# Patient Record
Sex: FEMALE | Race: WHITE | NOT HISPANIC OR LATINO | Employment: UNEMPLOYED | URBAN - NONMETROPOLITAN AREA
[De-identification: names, ages, dates, MRNs, and addresses within clinical notes are randomized per-mention and may not be internally consistent; named-entity substitution may affect disease eponyms.]

---

## 2017-01-09 ENCOUNTER — TELEPHONE (OUTPATIENT)
Dept: PSYCHIATRY | Facility: CLINIC | Age: 47
End: 2017-01-09

## 2017-01-09 NOTE — TELEPHONE ENCOUNTER
She can try taking 1.5 tablets of zyprexa until she sees me next week. If that does not help, we will try something new at next week's appointment. Thanks

## 2017-01-09 NOTE — TELEPHONE ENCOUNTER
Patient called to say that the Zyprexa wasn't working. Stated she didn't know if you could anything about it over the phone, but she sees you next week.

## 2017-01-18 ENCOUNTER — OFFICE VISIT (OUTPATIENT)
Dept: PSYCHIATRY | Facility: CLINIC | Age: 47
End: 2017-01-18

## 2017-01-18 VITALS
HEART RATE: 80 BPM | BODY MASS INDEX: 29.42 KG/M2 | DIASTOLIC BLOOD PRESSURE: 74 MMHG | WEIGHT: 155.8 LBS | SYSTOLIC BLOOD PRESSURE: 115 MMHG | HEIGHT: 61 IN

## 2017-01-18 DIAGNOSIS — F31.0 BIPOLAR I DISORDER, MOST RECENT EPISODE HYPOMANIC (HCC): Primary | ICD-10-CM

## 2017-01-18 DIAGNOSIS — F41.1 GENERALIZED ANXIETY DISORDER: ICD-10-CM

## 2017-01-18 PROCEDURE — 99213 OFFICE O/P EST LOW 20 MIN: CPT

## 2017-01-18 RX ORDER — OLANZAPINE 10 MG/1
20 TABLET, ORALLY DISINTEGRATING ORAL NIGHTLY
Qty: 30 TABLET | Refills: 2 | Status: SHIPPED | OUTPATIENT
Start: 2017-01-18 | End: 2017-01-19 | Stop reason: SDUPTHER

## 2017-01-18 NOTE — MR AVS SNAPSHOT
Rani CABEZAS Martins   1/18/2017 3:00 PM   Office Visit    Dept Phone:  [phone]   Encounter #:  [id number]    Provider:  Jey Palmer MD   Department:  [facility]                Your Full Care Plan              Today's Medication Changes          These changes are accurate as of: 1/18/17  3:42 PM.  If you have any questions, ask your nurse or doctor.               Medication(s)that have changed:     OLANZapine zydis 10 MG disintegrating tablet   Commonly known as:  ZyPREXA   Take 2 tablets by mouth Every Night.   What changed:  how much to take            Where to Get Your Medications      These medications were sent to [address] - [phone]  - [phone] [address] [address]     Phone:  [phone]     OLANZapine zydis 10 MG disintegrating tablet                  Your Updated Medication List          This list is accurate as of: 1/18/17  3:42 PM.  Always use your most recent med list.                ARIPiprazole 2 MG tablet   Commonly known as:  ABILIFY   Take 1 tablet by mouth Daily.       diazePAM 10 MG tablet   Commonly known as:  VALIUM   Take 1 tablet by mouth 3 (Three) Times a Day As Needed for anxiety.       doxepin 75 MG capsule   Commonly known as:  SINEquan   Take 1 capsule by mouth Every Night.       hydrOXYzine 50 MG capsule   Commonly known as:  VISTARIL   Take 1 capsule by mouth 3 (Three) Times a Day As Needed for anxiety.       OLANZapine zydis 10 MG disintegrating tablet   Commonly known as:  ZyPREXA   Take 2 tablets by mouth Every Night.               You Were Diagnosed With        Codes Comments    Bipolar I disorder, most recent episode hypomanic    -  Primary ICD-10-CM: F31.0  ICD-9-CM: 296.40     Generalized anxiety disorder     ICD-10-CM: F41.1  ICD-9-CM: 300.02       Instructions     None    Patient Instructions History      Upcoming Appointments     "Visit Type Date Time Department    MEDICINE CHECK 2017  3:00 PM [facility]    MEDICINE CHECK 2017  3:00 PM [facility]      [employer] Signup     [facility] [employer] allows you to send messages to your doctor, view your test results, renew your prescriptions, schedule appointments, and more. To sign up, go to [employer] and click on the Sign Up Now link in the New User? box. Enter your [employer] Activation Code exactly as it appears below along with the last four digits of your Social Security Number and your Date of Birth () to complete the sign-up process. If you do not sign up before the expiration date, you must request a new code.    [employer] Activation Code: [email or web address]  Expires: 2017  3:42 PM    If you have questions, you can email [email or web address] or call [phone] to talk to our [employer] staff. Remember, [employer] is NOT to be used for urgent needs. For medical emergencies, dial 911.               Other Info from Your Visit           Your Appointments     2017  3:00 PM EST   Medicine Check with Jey Palmer MD   [facility] (--)    [address] [place] [id number]   [phone]              Allergies     Lyrica [Pregabalin]      Cymbalta [Duloxetine Hcl]      Erythromycin      Lithium      Toradol [Ketorolac Tromethamine]      Tramadol      Penicillins  Rash      Vital Signs     Blood Pressure Pulse Height Weight Body Mass Index       115/74 80 61\" (154.9 cm) 155 lb 12.8 oz (70.7 kg) 29.44 kg/m2       Problems and Diagnoses Noted     Bipolar I disorder, most recent episode hypomanic    -  Primary    Generalized anxiety disorder            "

## 2017-01-18 NOTE — PROGRESS NOTES
Subjective   Rani Martins is a 46 y.o. female who is here today for medication management follow up.    Chief Complaint:  bipolar    HPI:  History of Present Illness     At last visit on 12/7/16, we increased Zyprexa to 10 mg at bedtime and continued all other meds.  I received a phone call 1/9/17 from patient saying she was having difficulty with sleep and was requesting to go up again on her Zyprexa.  I told her to increase the Zyprexa to 15 mg to see if this helped.  Today she reports she understood me to say to increase it to 20mg, and she says this has been effective. She says she is now sleeping through the night 7-8 hrs and as a result her mood is much better.       The following portions of the patient's history were reviewed and updated as appropriate: allergies, current medications, past family history, past medical history, past social history, past surgical history and problem list.    Review of Systems   Constitutional: Negative for appetite change, chills, diaphoresis, fatigue, fever and unexpected weight change.   HENT: Negative for hearing loss, sore throat, trouble swallowing and voice change.    Eyes: Negative for photophobia and visual disturbance.   Respiratory: Negative for cough, chest tightness and shortness of breath.    Cardiovascular: Negative for chest pain and palpitations.   Gastrointestinal: Negative for abdominal pain, constipation, nausea and vomiting.   Endocrine: Negative for cold intolerance and heat intolerance.   Genitourinary: Negative for dysuria and frequency.   Musculoskeletal: Negative for arthralgias, back pain, joint swelling and neck stiffness.   Skin: Negative for color change and wound.   Allergic/Immunologic: Negative for environmental allergies and immunocompromised state.   Neurological: Negative for dizziness, tremors, seizures, syncope, speech difficulty, weakness, light-headedness and headaches.   Hematological: Negative for adenopathy. Does not bruise/bleed  "easily.       Objective   Physical Exam   Constitutional: She appears well-developed and well-nourished. No distress.   Neurological: She is alert. Coordination and gait normal.   Vitals reviewed.    Blood pressure 115/74, pulse 80, height 61\" (154.9 cm), weight 155 lb 12.8 oz (70.7 kg).    Allergies   Allergen Reactions   • Lyrica [Pregabalin]    • Cymbalta [Duloxetine Hcl]    • Erythromycin    • Lithium    • Toradol [Ketorolac Tromethamine]    • Tramadol    • Penicillins Rash       Current Medications:   Current Outpatient Prescriptions   Medication Sig Dispense Refill   • ARIPiprazole (ABILIFY) 2 MG tablet Take 1 tablet by mouth Daily. 30 tablet 2   • diazePAM (VALIUM) 10 MG tablet Take 1 tablet by mouth 3 (Three) Times a Day As Needed for anxiety. 90 tablet 2   • doxepin (SINEquan) 75 MG capsule Take 1 capsule by mouth Every Night. 1 capsule 2   • hydrOXYzine (VISTARIL) 50 MG capsule Take 1 capsule by mouth 3 (Three) Times a Day As Needed for anxiety. 90 capsule 2   • OLANZapine zydis (ZyPREXA) 10 MG disintegrating tablet Take 2 tablets by mouth Every Night. 30 tablet 2     No current facility-administered medications for this visit.        Mental Status Exam:   Hygiene:   good  Cooperation:  Cooperative  Eye Contact:  Good  Psychomotor Behavior:  Appropriate  Affect:  Full range  Hopelessness: Denies  Speech:  slurred  Thought Process:  Goal directed  Thought Content:  Normal  Suicidal:  None  Homicidal:  None  Hallucinations:  None  Delusion:  None  Memory:  Intact  Orientation:  Person, Place, Time and Situation  Reliability:  fair  Insight:  Fair  Judgement:  Fair  Impulse Control:  Fair  Physical/Medical Issues:  Yes, fibromyalgia    Assessment/Plan   Problems Addressed this Visit     None      Visit Diagnoses     Bipolar I disorder, most recent episode hypomanic    -  Primary    Relevant Medications    OLANZapine zydis (ZyPREXA) 10 MG disintegrating tablet    Generalized anxiety disorder        Relevant " Medications    OLANZapine zydis (ZyPREXA) 10 MG disintegrating tablet          · Continue doxepin 75mg qhs for sleep  · Increase zyprexa to 10mg qhs for mood & sleep.   · Continue vistaril 50mg TID PRN anxiety.   · Continue Abilify 2mg Qam for mood.   · Continue Valium 10mg TID PRN anxiety.     We discussed risks, benefits, and side effects of the above medication and the patient was agreeable with the plan.    Return in about 5 weeks (around 2/23/2017) for Next scheduled follow up.  The patient was instructed to call clinic as needed or go to ER if in crisis.

## 2017-01-19 RX ORDER — OLANZAPINE 20 MG/1
20 TABLET, ORALLY DISINTEGRATING ORAL NIGHTLY
Qty: 30 TABLET | Refills: 2 | Status: SHIPPED | OUTPATIENT
Start: 2017-01-19 | End: 2017-03-06 | Stop reason: SDUPTHER

## 2017-01-19 RX ORDER — OLANZAPINE 20 MG/1
20 TABLET ORAL NIGHTLY
Qty: 30 TABLET | Refills: 0 | Status: CANCELLED | OUTPATIENT
Start: 2017-01-19

## 2017-01-19 NOTE — TELEPHONE ENCOUNTER
That's a pretty big dosage increase so there shouldn't be any reason they wouldn't fill it. Can you find out why they wouldn't fill it?

## 2017-01-19 NOTE — TELEPHONE ENCOUNTER
I didn't mean to put take 2. I meant to say take one 20mg tablet at bedtime. Thanks for clearing it up!

## 2017-01-19 NOTE — TELEPHONE ENCOUNTER
She likes the oral dissolving tablet. I think the prescription you just sent me to sign is the regular tablets so I'm going to delete that prescription and send in a new one for Zyprexa ODT 20mg take one at bedtime, 2 refills. Thanks again

## 2017-01-24 ENCOUNTER — TRANSCRIBE ORDERS (OUTPATIENT)
Dept: ADMINISTRATIVE | Facility: HOSPITAL | Age: 47
End: 2017-01-24

## 2017-01-24 DIAGNOSIS — Z12.31 VISIT FOR SCREENING MAMMOGRAM: Primary | ICD-10-CM

## 2017-01-26 ENCOUNTER — HOSPITAL ENCOUNTER (OUTPATIENT)
Dept: MAMMOGRAPHY | Facility: HOSPITAL | Age: 47
Discharge: HOME | End: 2017-01-26
Attending: FAMILY MEDICINE | Admitting: FAMILY MEDICINE

## 2017-01-26 DIAGNOSIS — Z12.31 VISIT FOR SCREENING MAMMOGRAM: ICD-10-CM

## 2017-01-26 PROCEDURE — 77067 SCR MAMMO BI INCL CAD: CPT | Performed by: RADIOLOGY

## 2017-01-26 PROCEDURE — 77063 BREAST TOMOSYNTHESIS BI: CPT | Performed by: RADIOLOGY

## 2017-01-26 PROCEDURE — 77063 BREAST TOMOSYNTHESIS BI: CPT

## 2017-01-26 PROCEDURE — G0202 SCR MAMMO BI INCL CAD: HCPCS

## 2017-03-06 RX ORDER — OLANZAPINE 10 MG/1
10 TABLET, ORALLY DISINTEGRATING ORAL NIGHTLY
Qty: 30 TABLET | Refills: 0 | Status: SHIPPED | OUTPATIENT
Start: 2017-03-06 | End: 2017-03-30 | Stop reason: SDUPTHER

## 2017-03-24 ENCOUNTER — OFFICE VISIT (OUTPATIENT)
Dept: UROLOGY | Facility: CLINIC | Age: 47
End: 2017-03-24

## 2017-03-24 VITALS
WEIGHT: 152 LBS | SYSTOLIC BLOOD PRESSURE: 108 MMHG | DIASTOLIC BLOOD PRESSURE: 73 MMHG | HEART RATE: 62 BPM | BODY MASS INDEX: 28.7 KG/M2 | HEIGHT: 61 IN

## 2017-03-24 DIAGNOSIS — R39.89 BLADDER PAIN: ICD-10-CM

## 2017-03-24 DIAGNOSIS — R39.198 DIFFICULTY URINATING: ICD-10-CM

## 2017-03-24 DIAGNOSIS — R33.9 INCOMPLETE BLADDER EMPTYING: Primary | ICD-10-CM

## 2017-03-24 LAB
BILIRUB BLD-MCNC: NEGATIVE MG/DL
CLARITY, POC: CLEAR
COLOR UR: YELLOW
GLUCOSE UR STRIP-MCNC: NEGATIVE MG/DL
KETONES UR QL: NEGATIVE
LEUKOCYTE EST, POC: NEGATIVE
NITRITE UR-MCNC: NEGATIVE MG/ML
PH UR: 6 [PH] (ref 5–8)
PROT UR STRIP-MCNC: NEGATIVE MG/DL
RBC # UR STRIP: NEGATIVE /UL
SP GR UR: 1 (ref 1–1.03)
UROBILINOGEN UR QL: NORMAL

## 2017-03-24 PROCEDURE — 81003 URINALYSIS AUTO W/O SCOPE: CPT | Performed by: NURSE PRACTITIONER

## 2017-03-24 PROCEDURE — 99203 OFFICE O/P NEW LOW 30 MIN: CPT | Performed by: NURSE PRACTITIONER

## 2017-03-24 PROCEDURE — 51798 US URINE CAPACITY MEASURE: CPT | Performed by: NURSE PRACTITIONER

## 2017-03-24 RX ORDER — OXYCODONE AND ACETAMINOPHEN 7.5; 325 MG/1; MG/1
1 TABLET ORAL EVERY 6 HOURS PRN
Status: ON HOLD | COMMUNITY
End: 2020-06-27

## 2017-03-24 NOTE — PROGRESS NOTES
Chief Complaint:          Chief Complaint   Patient presents with   • Urinary Retention   • Urinary Tract Infection       HPI:   46 y.o. female being seen for urinary difficulty that has been present since she has had a hysterectomy in 2001. Recently seen at [place] [facility] for similar symptoms.  Currently on Macrobid for UTI by Dr. Stewart - unsure if culture was sent.  Symptoms today are bladder spasms, back pain, urinary frequency,and sensation of incomplete bladder emptying.  She further reports a history of bipolar disorder and anxiety.  She is a nonsmoker.  Father has a history of thyroid cancer.  Urinalysis today reveals negative results. Post void residual bladder scan today reveals 37 mL.    HPI        Past Medical History:      History reviewed. No pertinent past medical history.      Current Meds:     Current Outpatient Prescriptions   Medication Sig Dispense Refill   • ARIPiprazole (ABILIFY) 2 MG tablet Take 1 tablet by mouth Daily. 30 tablet 2   • diazePAM (VALIUM) 10 MG tablet Take 1 tablet by mouth 3 (Three) Times a Day As Needed for anxiety. 90 tablet 2   • doxepin (SINEquan) 75 MG capsule Take 1 capsule by mouth Every Night. 1 capsule 2   • OLANZapine zydis (ZyPREXA) 10 MG disintegrating tablet Take 1 tablet by mouth Every Night. 30 tablet 0   • oxyCODONE-acetaminophen (PERCOCET) 7.5-325 MG per tablet Take 1 tablet by mouth Every 6 (Six) Hours As Needed.     • hydrOXYzine (VISTARIL) 50 MG capsule Take 1 capsule by mouth 3 (Three) Times a Day As Needed for anxiety. 90 capsule 2     No current facility-administered medications for this visit.         Allergies:      Allergies   Allergen Reactions   • Lyrica [Pregabalin]    • Cymbalta [Duloxetine Hcl]    • Erythromycin    • Lithium    • Toradol [Ketorolac Tromethamine]    • Tramadol    • Penicillins Rash        Past Surgical History:     Past Surgical History:   Procedure Laterality Date   • BREAST BIOPSY Left 19 yrs ago    benign   • HYSTERECTOMY      30          Social History:     Social History     Social History   • Marital status:      Spouse name: N/A   • Number of children: N/A   • Years of education: N/A     Occupational History   • Not on file.     Social History Main Topics   • Smoking status: Never Smoker   • Smokeless tobacco: Never Used   • Alcohol use No   • Drug use: No   • Sexual activity: Not on file     Other Topics Concern   • Not on file     Social History Narrative       Family History:     Family History   Problem Relation Age of Onset   • Throat cancer Father    • Heart disease Mother        Review of Systems:     Review of Systems   Constitutional: Negative for chills, fatigue and fever.   Respiratory: Negative for cough, shortness of breath and wheezing.    Cardiovascular: Negative for leg swelling.   Gastrointestinal: Positive for abdominal pain and nausea. Negative for vomiting.   Musculoskeletal: Positive for back pain and joint swelling.   Neurological: Negative for dizziness and headaches.   Psychiatric/Behavioral: Negative for confusion.       Physical Exam:     Physical Exam   Constitutional: She appears well-developed and well-nourished. No distress.   Skin: Skin is warm and dry. No rash noted. She is not diaphoretic. No erythema. No pallor.   Nursing note and vitals reviewed.      Procedure:     No notes on file      Assessment:     Encounter Diagnoses   Name Primary?   • Incomplete bladder emptying Yes   • Bladder pain    • Difficulty urinating      Orders Placed This Encounter   Procedures   • Bladder Scan   • POC Urinalysis Dipstick, Automated       Plan:   Patient did have a postvoid residual of 37 mL and has difficulty with urination son recommend she have a cystoscopy with possible urethral dilation by Dr. Deluca or Dr. Samuels for these symptoms. Discussed the procedure in detail with the patient and she is agreeable. She will be seen in follow-up after the procedures have been completed.    Counseling was given to  patient and family for the following topics diagnostic results, patient and family education, impressions and risks and benefits of treatment options. and the interim medical history and current results were reviewed.  A treatment plan with follow-up was made. Total time of the encounter was 30 minutes and 30 minutes were spent discussing Incomplete bladder emptying [R33.9] face-to-face.       This document has been electronically signed by GABE Gold March 24, 2017 2:12 PM

## 2017-03-30 ENCOUNTER — OFFICE VISIT (OUTPATIENT)
Dept: PSYCHIATRY | Facility: CLINIC | Age: 47
End: 2017-03-30

## 2017-03-30 VITALS
HEIGHT: 61 IN | BODY MASS INDEX: 27.94 KG/M2 | HEART RATE: 77 BPM | WEIGHT: 148 LBS | DIASTOLIC BLOOD PRESSURE: 74 MMHG | SYSTOLIC BLOOD PRESSURE: 130 MMHG

## 2017-03-30 DIAGNOSIS — F31.32 BIPOLAR 1 DISORDER, DEPRESSED, MODERATE (HCC): Primary | ICD-10-CM

## 2017-03-30 DIAGNOSIS — F41.1 GENERALIZED ANXIETY DISORDER: ICD-10-CM

## 2017-03-30 PROCEDURE — 99214 OFFICE O/P EST MOD 30 MIN: CPT

## 2017-03-30 RX ORDER — HYDROXYZINE PAMOATE 50 MG/1
50 CAPSULE ORAL 3 TIMES DAILY PRN
Qty: 90 CAPSULE | Refills: 1 | Status: SHIPPED | OUTPATIENT
Start: 2017-03-30 | End: 2017-05-25

## 2017-03-30 RX ORDER — BUPROPION HYDROCHLORIDE 150 MG/1
150 TABLET, EXTENDED RELEASE ORAL DAILY
Qty: 30 TABLET | Refills: 1 | Status: SHIPPED | OUTPATIENT
Start: 2017-03-30 | End: 2017-05-25 | Stop reason: SDUPTHER

## 2017-03-30 RX ORDER — OLANZAPINE 10 MG/1
10 TABLET, ORALLY DISINTEGRATING ORAL NIGHTLY
Qty: 30 TABLET | Refills: 1 | Status: SHIPPED | OUTPATIENT
Start: 2017-03-30 | End: 2017-05-02 | Stop reason: SDUPTHER

## 2017-03-30 RX ORDER — DIAZEPAM 10 MG/1
10 TABLET ORAL 3 TIMES DAILY PRN
Qty: 90 TABLET | Refills: 1
Start: 2017-03-30 | End: 2017-05-02 | Stop reason: SDUPTHER

## 2017-03-30 NOTE — PROGRESS NOTES
Subjective   Rani Martins is a 46 y.o. female who is here today for medication management follow up.    Chief Complaint:  bipolar    HPI:  History of Present Illness     Her mother accompanies her to today's appointment as usual.  Over the past month or so her mood has been feeling much more irritable than usual.  No other manic symptoms.  She said someone told her doxepin can make you gain weight so she stopped taking it 2-3 weeks ago and has lost about 7 pounds since her last visit.  She says her sleep has not been affected and she has been sleeping well without it, about 7-8 hours per night.  She stopped taking her Abilify because she didn't feel like it was doing anything.  Anxiety has been fairly well controlled on her Valium.         The following portions of the patient's history were reviewed and updated as appropriate: allergies, current medications, past family history, past medical history, past social history, past surgical history and problem list.    Review of Systems   Constitutional: Negative for appetite change, chills, diaphoresis, fatigue, fever and unexpected weight change.   HENT: Negative for hearing loss, sore throat, trouble swallowing and voice change.    Eyes: Negative for photophobia and visual disturbance.   Respiratory: Negative for cough, chest tightness and shortness of breath.    Cardiovascular: Negative for chest pain and palpitations.   Gastrointestinal: Negative for abdominal pain, constipation, nausea and vomiting.   Endocrine: Negative for cold intolerance and heat intolerance.   Genitourinary: Negative for dysuria and frequency.   Musculoskeletal: Negative for arthralgias, back pain, joint swelling and neck stiffness.   Skin: Negative for color change and wound.   Allergic/Immunologic: Negative for environmental allergies and immunocompromised state.   Neurological: Negative for dizziness, tremors, seizures, syncope, speech difficulty, weakness, light-headedness and headaches.  "  Hematological: Negative for adenopathy. Does not bruise/bleed easily.       Objective   Physical Exam   Constitutional: She appears well-developed and well-nourished. No distress.   Neurological: She is alert. Coordination and gait normal.   Vitals reviewed.    Blood pressure 130/74, pulse 77, height 61\" (154.9 cm), weight 148 lb (67.1 kg).    Allergies   Allergen Reactions   • Lyrica [Pregabalin]    • Cymbalta [Duloxetine Hcl]    • Erythromycin    • Lithium    • Toradol [Ketorolac Tromethamine]    • Tramadol    • Penicillins Rash       Current Medications:   Current Outpatient Prescriptions   Medication Sig Dispense Refill   • buPROPion SR (WELLBUTRIN SR) 150 MG 12 hr tablet Take 1 tablet by mouth Daily. 30 tablet 1   • diazePAM (VALIUM) 10 MG tablet Take 1 tablet by mouth 3 (Three) Times a Day As Needed for Anxiety. 90 tablet 1   • hydrOXYzine (VISTARIL) 50 MG capsule Take 1 capsule by mouth 3 (Three) Times a Day As Needed for Anxiety. 90 capsule 1   • OLANZapine zydis (ZyPREXA) 10 MG disintegrating tablet Take 1 tablet by mouth Every Night. 30 tablet 1   • oxyCODONE-acetaminophen (PERCOCET) 7.5-325 MG per tablet Take 1 tablet by mouth Every 6 (Six) Hours As Needed.       No current facility-administered medications for this visit.        Mental Status Exam:   Hygiene:   good  Cooperation:  Cooperative  Eye Contact:  Good  Psychomotor Behavior:  Appropriate  Affect:  Full range  Hopelessness: Denies  Speech:  slurred  Thought Process:  Goal directed  Thought Content:  Normal  Suicidal:  None  Homicidal:  None  Hallucinations:  None  Delusion:  None  Memory:  Intact  Orientation:  Person, Place, Time and Situation  Reliability:  fair  Insight:  Fair  Judgement:  Fair  Impulse Control:  Fair  Physical/Medical Issues:  Yes, fibromyalgia  No looseness of associations  Fund of knowledge average  Language intact    Assessment/Plan   Problems Addressed this Visit     None      Visit Diagnoses     Bipolar 1 disorder, " depressed, moderate    -  Primary    Relevant Medications    diazePAM (VALIUM) 10 MG tablet    hydrOXYzine (VISTARIL) 50 MG capsule    OLANZapine zydis (ZyPREXA) 10 MG disintegrating tablet    buPROPion SR (WELLBUTRIN SR) 150 MG 12 hr tablet    Generalized anxiety disorder        Relevant Medications    diazePAM (VALIUM) 10 MG tablet    hydrOXYzine (VISTARIL) 50 MG capsule    OLANZapine zydis (ZyPREXA) 10 MG disintegrating tablet    buPROPion SR (WELLBUTRIN SR) 150 MG 12 hr tablet          · Continue zyprexa 10mg qhs for mood & sleep.   · Continue vistaril 50mg TID PRN anxiety.   · Continue Valium 10mg TID PRN anxiety.   · Start Wellbutrin  mg daily for mood.    We discussed risks, benefits, and side effects of the above medication and the patient was agreeable with the plan.    Return in about 4 weeks (around 4/27/2017) for Next scheduled follow up.  The patient was instructed to call clinic as needed or go to ER if in crisis.

## 2017-05-02 ENCOUNTER — OFFICE VISIT (OUTPATIENT)
Dept: PSYCHIATRY | Facility: CLINIC | Age: 47
End: 2017-05-02

## 2017-05-02 VITALS
BODY MASS INDEX: 27.38 KG/M2 | DIASTOLIC BLOOD PRESSURE: 72 MMHG | HEART RATE: 80 BPM | WEIGHT: 145 LBS | SYSTOLIC BLOOD PRESSURE: 109 MMHG | HEIGHT: 61 IN

## 2017-05-02 DIAGNOSIS — F41.1 GENERALIZED ANXIETY DISORDER: ICD-10-CM

## 2017-05-02 DIAGNOSIS — F31.32 BIPOLAR 1 DISORDER, DEPRESSED, MODERATE (HCC): Primary | ICD-10-CM

## 2017-05-02 PROCEDURE — 99214 OFFICE O/P EST MOD 30 MIN: CPT

## 2017-05-02 RX ORDER — DIAZEPAM 10 MG/1
10 TABLET ORAL 3 TIMES DAILY PRN
Qty: 90 TABLET | Refills: 1
Start: 2017-05-02 | End: 2017-05-25 | Stop reason: SDUPTHER

## 2017-05-02 RX ORDER — PHENAZOPYRIDINE HYDROCHLORIDE 200 MG/1
TABLET, FILM COATED ORAL
Refills: 0 | COMMUNITY
Start: 2017-03-01 | End: 2017-05-02

## 2017-05-02 RX ORDER — OLANZAPINE 15 MG/1
15 TABLET, ORALLY DISINTEGRATING ORAL NIGHTLY
Qty: 30 TABLET | Refills: 1 | Status: SHIPPED | OUTPATIENT
Start: 2017-05-02 | End: 2017-05-25 | Stop reason: SDUPTHER

## 2017-05-25 ENCOUNTER — OFFICE VISIT (OUTPATIENT)
Dept: PSYCHIATRY | Facility: CLINIC | Age: 47
End: 2017-05-25

## 2017-05-25 VITALS
WEIGHT: 149.2 LBS | DIASTOLIC BLOOD PRESSURE: 75 MMHG | HEART RATE: 59 BPM | HEIGHT: 61 IN | SYSTOLIC BLOOD PRESSURE: 121 MMHG | BODY MASS INDEX: 28.17 KG/M2

## 2017-05-25 DIAGNOSIS — F41.1 GENERALIZED ANXIETY DISORDER: ICD-10-CM

## 2017-05-25 DIAGNOSIS — F31.32 BIPOLAR 1 DISORDER, DEPRESSED, MODERATE (HCC): Primary | ICD-10-CM

## 2017-05-25 PROCEDURE — 99214 OFFICE O/P EST MOD 30 MIN: CPT

## 2017-05-25 PROCEDURE — 90833 PSYTX W PT W E/M 30 MIN: CPT

## 2017-05-25 RX ORDER — BUPROPION HYDROCHLORIDE 200 MG/1
200 TABLET, EXTENDED RELEASE ORAL DAILY
Qty: 30 TABLET | Refills: 1 | Status: SHIPPED | OUTPATIENT
Start: 2017-05-25 | End: 2017-07-24 | Stop reason: SDUPTHER

## 2017-05-25 RX ORDER — OLANZAPINE 20 MG/1
20 TABLET, ORALLY DISINTEGRATING ORAL NIGHTLY
Qty: 30 TABLET | Refills: 1 | Status: SHIPPED | OUTPATIENT
Start: 2017-05-25 | End: 2017-07-24 | Stop reason: SDUPTHER

## 2017-05-25 RX ORDER — DIAZEPAM 10 MG/1
10 TABLET ORAL 3 TIMES DAILY PRN
Qty: 90 TABLET | Refills: 1
Start: 2017-05-25 | End: 2017-07-24 | Stop reason: SDUPTHER

## 2017-05-25 RX ORDER — MIRTAZAPINE 15 MG/1
15 TABLET, FILM COATED ORAL NIGHTLY
Qty: 30 TABLET | Refills: 1 | Status: SHIPPED | OUTPATIENT
Start: 2017-05-25 | End: 2017-07-24 | Stop reason: SDUPTHER

## 2017-07-21 RX ORDER — OLANZAPINE 20 MG/1
20 TABLET, ORALLY DISINTEGRATING ORAL NIGHTLY
Qty: 30 TABLET | Refills: 1 | Status: CANCELLED | OUTPATIENT
Start: 2017-07-21

## 2017-07-21 RX ORDER — DIAZEPAM 10 MG/1
10 TABLET ORAL 3 TIMES DAILY PRN
Qty: 90 TABLET | Refills: 1 | Status: CANCELLED
Start: 2017-07-21

## 2017-07-21 RX ORDER — MIRTAZAPINE 15 MG/1
15 TABLET, FILM COATED ORAL NIGHTLY
Qty: 30 TABLET | Refills: 1 | Status: CANCELLED | OUTPATIENT
Start: 2017-07-21 | End: 2018-07-21

## 2017-07-21 RX ORDER — BUPROPION HYDROCHLORIDE 200 MG/1
200 TABLET, EXTENDED RELEASE ORAL DAILY
Qty: 30 TABLET | Refills: 1 | Status: CANCELLED | OUTPATIENT
Start: 2017-07-21 | End: 2018-07-21

## 2017-07-24 RX ORDER — BUPROPION HYDROCHLORIDE 200 MG/1
200 TABLET, EXTENDED RELEASE ORAL DAILY
Qty: 30 TABLET | Refills: 1 | Status: SHIPPED | OUTPATIENT
Start: 2017-07-24 | End: 2017-09-18

## 2017-07-24 RX ORDER — DIAZEPAM 10 MG/1
10 TABLET ORAL 3 TIMES DAILY PRN
Qty: 90 TABLET | Refills: 1
Start: 2017-07-24 | End: 2017-09-18 | Stop reason: SDUPTHER

## 2017-07-24 RX ORDER — OLANZAPINE 20 MG/1
20 TABLET, ORALLY DISINTEGRATING ORAL NIGHTLY
Qty: 30 TABLET | Refills: 1 | Status: SHIPPED | OUTPATIENT
Start: 2017-07-24 | End: 2017-09-18

## 2017-07-24 RX ORDER — MIRTAZAPINE 15 MG/1
15 TABLET, FILM COATED ORAL NIGHTLY
Qty: 30 TABLET | Refills: 1 | Status: SHIPPED | OUTPATIENT
Start: 2017-07-24 | End: 2017-09-18 | Stop reason: SDUPTHER

## 2017-09-18 ENCOUNTER — OFFICE VISIT (OUTPATIENT)
Dept: PSYCHIATRY | Facility: CLINIC | Age: 47
End: 2017-09-18

## 2017-09-18 ENCOUNTER — TELEPHONE (OUTPATIENT)
Dept: PSYCHIATRY | Facility: CLINIC | Age: 47
End: 2017-09-18

## 2017-09-18 VITALS
WEIGHT: 141 LBS | BODY MASS INDEX: 26.62 KG/M2 | HEIGHT: 61 IN | DIASTOLIC BLOOD PRESSURE: 74 MMHG | HEART RATE: 58 BPM | SYSTOLIC BLOOD PRESSURE: 104 MMHG

## 2017-09-18 DIAGNOSIS — F41.1 GENERALIZED ANXIETY DISORDER: ICD-10-CM

## 2017-09-18 DIAGNOSIS — F31.32 BIPOLAR 1 DISORDER, DEPRESSED, MODERATE (HCC): Primary | ICD-10-CM

## 2017-09-18 PROCEDURE — 99214 OFFICE O/P EST MOD 30 MIN: CPT | Performed by: NURSE PRACTITIONER

## 2017-09-18 RX ORDER — MIRTAZAPINE 15 MG/1
15 TABLET, FILM COATED ORAL NIGHTLY
Qty: 30 TABLET | Refills: 0 | Status: SHIPPED | OUTPATIENT
Start: 2017-09-18 | End: 2017-10-16 | Stop reason: SDUPTHER

## 2017-09-18 RX ORDER — DIAZEPAM 10 MG/1
10 TABLET ORAL 3 TIMES DAILY PRN
Qty: 90 TABLET | Refills: 0 | Status: SHIPPED | OUTPATIENT
Start: 2017-09-18 | End: 2017-10-16 | Stop reason: SDUPTHER

## 2017-09-18 RX ORDER — OLANZAPINE 15 MG/1
30 TABLET, ORALLY DISINTEGRATING ORAL NIGHTLY
Qty: 60 TABLET | Refills: 0 | Status: SHIPPED | OUTPATIENT
Start: 2017-09-18 | End: 2017-10-16

## 2017-09-18 RX ORDER — FLUOXETINE HYDROCHLORIDE 20 MG/1
20 CAPSULE ORAL DAILY
Qty: 30 CAPSULE | Refills: 1 | Status: SHIPPED | OUTPATIENT
Start: 2017-09-18 | End: 2017-10-16 | Stop reason: SDUPTHER

## 2017-09-18 NOTE — TELEPHONE ENCOUNTER
Called in her Valium 10mg #90 0 refills into [facility]. They mention on her Zyprexa the insurance is only going to cover 1 tablet once a day instead of 2 tablets a day. Please advise

## 2017-09-18 NOTE — TELEPHONE ENCOUNTER
Insurance will not cover increasing dose of zyprexa-will continue at 20mg please inform pharmacy to continue.

## 2017-09-18 NOTE — PROGRESS NOTES
Subjective   Rani Martins is a 47 y.o. female who is here today for medication management follow up.    Chief Complaint:  bipolar    HPI:  History of Present Illness     Her mother accompanies her to today's appointment as usual.  She reports she hears voices derogatory in nature.  She feels torment by the voices.  At times the voices are daily.  She also reports seeing things early in morning and evening-small girl in her bedroom. She also reports these hallucinations are getting worse recently.  She reports some symptoms of depression including isolation, hopelessness, helplessness, feeling like she is worthless.  She reports vague, passive any thoughts to harm self or others.   She adamantly denies any intent or plan.   She reports ongoing issues with chronic pain.  She does report increased stressed related to her daughter being in [place] and having to endure hurricane and will probably not be able to visit at [facility].   Depression rated 5/10.   Patient is also experiencing increased anxiety with episodes of panic relieved by valium.  She remains unable to work due to severe distraction and psychosis.    The following portions of the patient's history were reviewed and updated as appropriate: allergies, current medications, past family history, past medical history, past social history, past surgical history and problem list.    Review of Systems   Constitutional: Positive for fatigue.   Respiratory: Negative for shortness of breath.    Cardiovascular: Negative for chest pain.   Gastrointestinal: Negative for nausea.   Musculoskeletal: Positive for back pain and myalgias. Negative for neck stiffness.   Neurological: Negative for tremors.   All other systems reviewed and are negative.      Objective   Physical Exam   Constitutional: She appears well-developed and well-nourished. No distress.   Neurological: She is alert. Coordination and gait normal.   Vitals reviewed.    Blood pressure 104/74, pulse 58,  "height 61\" (154.9 cm), weight 141 lb (64 kg).    Allergies   Allergen Reactions   • Lyrica [Pregabalin]    • Cymbalta [Duloxetine Hcl]    • Erythromycin    • Lithium    • Toradol [Ketorolac Tromethamine]    • Tramadol    • Penicillins Rash       Current Medications:   Current Outpatient Prescriptions   Medication Sig Dispense Refill   • buPROPion SR (WELLBUTRIN SR) 200 MG 12 hr tablet Take 1 tablet by mouth Daily. 30 tablet 1   • diazePAM (VALIUM) 10 MG tablet Take 1 tablet by mouth 3 (Three) Times a Day As Needed for Anxiety. 90 tablet 1   • mirtazapine (REMERON) 15 MG tablet Take 1 tablet by mouth Every Night. 30 tablet 1   • OLANZapine zydis (zyPREXA) 20 MG disintegrating tablet Take 1 tablet by mouth Every Night. 30 tablet 1   • oxyCODONE-acetaminophen (PERCOCET) 7.5-325 MG per tablet Take 1 tablet by mouth Every 6 (Six) Hours As Needed.       No current facility-administered medications for this visit.        Mental Status Exam:   Hygiene:   good  Cooperation:  Cooperative  Eye Contact:  Good  Psychomotor Behavior:  Appropriate  Affect:  Full range  Hopelessness: Denies  Speech:  slurred  Thought Process:  Goal directed  Thought Content:  Normal  Suicidal:  None  Homicidal:  None  Hallucinations:  None  Delusion:  None  Memory:  Intact  Orientation:  Person, Place, Time and Situation  Reliability:  fair  Insight:  Fair  Judgement:  Fair  Impulse Control:  Fair  Physical/Medical Issues:  Yes, fibromyalgia  No looseness of associations  Fund of knowledge average  Language intact    Assessment/Plan   Problems Addressed this Visit     None      Visit Diagnoses     Bipolar 1 disorder, depressed, moderate    -  Primary    Relevant Medications    mirtazapine (REMERON) 15 MG tablet    diazePAM (VALIUM) 10 MG tablet    OLANZapine zydis (zyPREXA) 15 MG disintegrating tablet    FLUoxetine (PROzac) 20 MG capsule    Generalized anxiety disorder        Relevant Medications    mirtazapine (REMERON) 15 MG tablet    diazePAM " (VALIUM) 10 MG tablet    OLANZapine zydis (zyPREXA) 15 MG disintegrating tablet    FLUoxetine (PROzac) 20 MG capsule          · Increase zyprexa ODT up to 20mg qhs for mood stabilization & sleep.   · Continue Valium 10mg TID PRN anxiety.   · Switch from wellbutrin to prozac.  · Continue Remeron 7.5-15mg QHS PRN insomnia.  · Return to clinic for medication management in 4-6 weeks.  *Patient was instructed on medication side effects, benefits, and also of no treatment.  Patient was given an explanation regarding potential for increased risk of diabetes, lipids, and weight gain.  Labs will be assessed as clinically indicated.  Diet was discussed especially healthy diet choices and increasing activity and exercise.  Patient was strongly urged to continue weight maintance or weight loss efforts.  Patient reported verbalized understanding of instructions  We discussed risks, benefits, and side effects of the above medication and the patient was agreeable with the plan.Patient is being prescribed a controlled substance as part of treatment plan. Patient has been educated of appropriate use of the medications, including risk of somnolence, limited ability to drive and/or work safely, and potential for dependence, respiratory depression and overdose. Patient is also informed that the medication are to be used by the patient only- avoid any combined use of ETOH or other substances unless prescribed.     Rafy report of past 12 months reviewed with no new issues. Patient reports taking medication as prescribed.  Patient denies any abuse/misuse of medication.  Patient denies any other substance use issues.  No apparent substance related issues.  Patient appropriate to continue with medication.  Reinforced risk of medication.    Return in about 4 weeks (around 10/16/2017) for continue therapy.  The patient was instructed to call clinic as needed or go to ER if in crisis.

## 2017-10-16 ENCOUNTER — OFFICE VISIT (OUTPATIENT)
Dept: PSYCHIATRY | Facility: CLINIC | Age: 47
End: 2017-10-16

## 2017-10-16 ENCOUNTER — TELEPHONE (OUTPATIENT)
Dept: PSYCHIATRY | Facility: CLINIC | Age: 47
End: 2017-10-16

## 2017-10-16 VITALS
HEIGHT: 61 IN | BODY MASS INDEX: 25.49 KG/M2 | SYSTOLIC BLOOD PRESSURE: 111 MMHG | DIASTOLIC BLOOD PRESSURE: 71 MMHG | HEART RATE: 75 BPM | WEIGHT: 135 LBS

## 2017-10-16 DIAGNOSIS — F41.1 GENERALIZED ANXIETY DISORDER: ICD-10-CM

## 2017-10-16 DIAGNOSIS — F31.32 BIPOLAR 1 DISORDER, DEPRESSED, MODERATE (HCC): Primary | ICD-10-CM

## 2017-10-16 PROCEDURE — 99213 OFFICE O/P EST LOW 20 MIN: CPT | Performed by: NURSE PRACTITIONER

## 2017-10-16 RX ORDER — OLANZAPINE 20 MG/1
20 TABLET ORAL NIGHTLY
Qty: 30 TABLET | Refills: 0 | Status: SHIPPED | OUTPATIENT
Start: 2017-10-16 | End: 2017-12-07 | Stop reason: SDUPTHER

## 2017-10-16 RX ORDER — DIAZEPAM 10 MG/1
10 TABLET ORAL 3 TIMES DAILY PRN
Qty: 90 TABLET | Refills: 0 | Status: SHIPPED | OUTPATIENT
Start: 2017-10-16 | End: 2017-11-14 | Stop reason: SDUPTHER

## 2017-10-16 RX ORDER — MIRTAZAPINE 15 MG/1
15 TABLET, FILM COATED ORAL NIGHTLY
Qty: 30 TABLET | Refills: 0 | Status: SHIPPED | OUTPATIENT
Start: 2017-10-16 | End: 2017-12-28 | Stop reason: HOSPADM

## 2017-10-16 RX ORDER — METHOCARBAMOL 750 MG/1
TABLET, FILM COATED ORAL
COMMUNITY
Start: 2017-09-28 | End: 2018-02-27 | Stop reason: ALTCHOICE

## 2017-10-16 RX ORDER — FLUOXETINE HYDROCHLORIDE 20 MG/1
20 CAPSULE ORAL DAILY
Qty: 30 CAPSULE | Refills: 1 | Status: SHIPPED | OUTPATIENT
Start: 2017-10-16 | End: 2017-12-28 | Stop reason: SDUPTHER

## 2017-10-16 NOTE — PROGRESS NOTES
"Subjective   Rani Martins is a 47 y.o. female who is here today for medication management follow up.    Chief Complaint:  bipolar    HPI:  History of Present Illness   Patient presents with ongoing mood issues she has recent worsening of mood and pain.  She has been recently had worsening of pain.  She is mouning of Elie Tinsley.  She continues to have difficulty with concentration and focus.  She reports some symptoms of depression including isolation, hopelessness, helplessness, feeling like she is worthless.  She reports vague, passive any thoughts to harm self or others.   She adamantly denies any intent or plan.   She reports ongoing issues with chronic pain.  d will be resume.   Depression rated 6/10.   Patient is also experiencing increased anxiety with episodes of panic relieved by valium.  She remains unable to work due to severe distraction and psychosis.    The following portions of the patient's history were reviewed and updated as appropriate: allergies, current medications, past family history, past medical history, past social history, past surgical history and problem list.    Review of Systems   Constitutional: Positive for fatigue.   Respiratory: Negative for shortness of breath.    Cardiovascular: Negative for chest pain.   Gastrointestinal: Negative for nausea.   Musculoskeletal: Positive for back pain and myalgias. Negative for neck stiffness.   Neurological: Negative for tremors.   All other systems reviewed and are negative.      Objective   Physical Exam   Constitutional: She appears well-developed and well-nourished. No distress.   Neurological: She is alert. Coordination and gait normal.   Vitals reviewed.    Blood pressure 111/71, pulse 75, height 61\" (154.9 cm), weight 135 lb (61.2 kg).    Allergies   Allergen Reactions   • Lyrica [Pregabalin]    • Cymbalta [Duloxetine Hcl]    • Erythromycin    • Lithium    • Toradol [Ketorolac Tromethamine]    • Tramadol    • Penicillins Rash "       Current Medications:   Current Outpatient Prescriptions   Medication Sig Dispense Refill   • diazePAM (VALIUM) 10 MG tablet Take 1 tablet by mouth 3 (Three) Times a Day As Needed for Anxiety. 90 tablet 0   • FLUoxetine (PROzac) 20 MG capsule Take 1 capsule by mouth Daily. 30 capsule 1   • methocarbamol (ROBAXIN) 750 MG tablet      • mirtazapine (REMERON) 15 MG tablet Take 1 tablet by mouth Every Night. 30 tablet 0   • OLANZapine zydis (zyPREXA) 15 MG disintegrating tablet Take 2 tablets by mouth Every Night. 60 tablet 0   • oxyCODONE-acetaminophen (PERCOCET) 7.5-325 MG per tablet Take 1 tablet by mouth Every 6 (Six) Hours As Needed.       No current facility-administered medications for this visit.        Mental Status Exam:   Hygiene:   good  Cooperation:  Cooperative  Eye Contact:  Good  Psychomotor Behavior:  Appropriate  Affect:  Full range  Hopelessness: Denies  Speech:  slurred  Thought Process:  Goal directed  Thought Content:  Normal  Suicidal:  None  Homicidal:  None  Hallucinations:  None  Delusion:  None  Memory:  Intact  Orientation:  Person, Place, Time and Situation  Reliability:  fair  Insight:  Fair  Judgement:  Fair  Impulse Control:  Fair  Physical/Medical Issues:  Yes, fibromyalgia  No looseness of associations  Fund of knowledge average  Language intact    Assessment/Plan   Problems Addressed this Visit     None      Visit Diagnoses     Bipolar 1 disorder, depressed, moderate    -  Primary    Relevant Medications    FLUoxetine (PROzac) 20 MG capsule    OLANZapine (zyPREXA) 20 MG tablet    mirtazapine (REMERON) 15 MG tablet    diazePAM (VALIUM) 10 MG tablet    Generalized anxiety disorder        Relevant Medications    FLUoxetine (PROzac) 20 MG capsule    OLANZapine (zyPREXA) 20 MG tablet    mirtazapine (REMERON) 15 MG tablet    diazePAM (VALIUM) 10 MG tablet      ·  zyprexa ODT up to 20mg qhs for mood stabilization & sleep.   · Continue Valium 10mg TID PRN anxiety.   · Switch from  wellbutrin to prozac.  · Continue Remeron 7.5-15mg QHS PRN insomnia.  · Return to clinic for medication management in 4-6 weeks.  *Patient was instructed on medication side effects, benefits, and also of no treatment.  Patient was given an explanation regarding potential for increased risk of diabetes, lipids, and weight gain.  Labs will be assessed as clinically indicated.  Diet was discussed especially healthy diet choices and increasing activity and exercise.  Patient was strongly urged to continue weight maintance or weight loss efforts.  Patient reported verbalized understanding of instructions  We discussed risks, benefits, and side effects of the above medication and the patient was agreeable with the plan.Patient is being prescribed a controlled substance as part of treatment plan. Patient has been educated of appropriate use of the medications, including risk of somnolence, limited ability to drive and/or work safely, and potential for dependence, respiratory depression and overdose. Patient is also informed that the medication are to be used by the patient only- avoid any combined use of ETOH or other substances unless prescribed.     Rafy report of past 12 months reviewed with no new issues. Patient reports taking medication as prescribed.  Patient denies any abuse/misuse of medication.  Patient denies any other substance use issues.  No apparent substance related issues.  Patient appropriate to continue with medication.  Reinforced risk of medication.    Return in about 6 weeks (around 11/27/2017).  The patient was instructed to call clinic as needed or go to ER if in crisis.

## 2017-11-14 RX ORDER — DIAZEPAM 10 MG/1
10 TABLET ORAL 3 TIMES DAILY PRN
Qty: 90 TABLET | Refills: 0 | Status: SHIPPED | OUTPATIENT
Start: 2017-11-14 | End: 2017-12-11 | Stop reason: SDUPTHER

## 2017-11-15 ENCOUNTER — TELEPHONE (OUTPATIENT)
Dept: PSYCHIATRY | Facility: CLINIC | Age: 47
End: 2017-11-15

## 2017-12-07 RX ORDER — OLANZAPINE 20 MG/1
20 TABLET ORAL NIGHTLY
Qty: 30 TABLET | Refills: 0 | Status: SHIPPED | OUTPATIENT
Start: 2017-12-07 | End: 2017-12-11 | Stop reason: SDUPTHER

## 2017-12-12 ENCOUNTER — TELEPHONE (OUTPATIENT)
Dept: PSYCHIATRY | Facility: CLINIC | Age: 47
End: 2017-12-12

## 2017-12-12 RX ORDER — OLANZAPINE 20 MG/1
20 TABLET ORAL NIGHTLY
Qty: 30 TABLET | Refills: 0 | Status: SHIPPED | OUTPATIENT
Start: 2017-12-12 | End: 2017-12-28

## 2017-12-12 RX ORDER — OLANZAPINE 20 MG/1
20 TABLET, ORALLY DISINTEGRATING ORAL NIGHTLY
Qty: 30 TABLET | Refills: 0 | Status: SHIPPED | OUTPATIENT
Start: 2017-12-12 | End: 2017-12-28

## 2017-12-12 RX ORDER — DIAZEPAM 10 MG/1
10 TABLET ORAL 3 TIMES DAILY PRN
Qty: 90 TABLET | Refills: 0 | Status: SHIPPED | OUTPATIENT
Start: 2017-12-12 | End: 2017-12-28 | Stop reason: SDUPTHER

## 2017-12-28 ENCOUNTER — TELEPHONE (OUTPATIENT)
Dept: PSYCHIATRY | Facility: CLINIC | Age: 47
End: 2017-12-28

## 2017-12-28 ENCOUNTER — OFFICE VISIT (OUTPATIENT)
Dept: PSYCHIATRY | Facility: CLINIC | Age: 47
End: 2017-12-28

## 2017-12-28 VITALS — WEIGHT: 143 LBS | HEIGHT: 61 IN | BODY MASS INDEX: 27 KG/M2

## 2017-12-28 DIAGNOSIS — F41.1 GENERALIZED ANXIETY DISORDER: ICD-10-CM

## 2017-12-28 DIAGNOSIS — F31.32 BIPOLAR 1 DISORDER, DEPRESSED, MODERATE (HCC): ICD-10-CM

## 2017-12-28 DIAGNOSIS — F31.32 BIPOLAR 1 DISORDER, DEPRESSED, MODERATE (HCC): Primary | ICD-10-CM

## 2017-12-28 PROCEDURE — 99214 OFFICE O/P EST MOD 30 MIN: CPT | Performed by: NURSE PRACTITIONER

## 2017-12-28 RX ORDER — FLUOXETINE HYDROCHLORIDE 20 MG/1
20 CAPSULE ORAL DAILY
Qty: 30 CAPSULE | Refills: 1 | Status: SHIPPED | OUTPATIENT
Start: 2017-12-28 | End: 2018-01-29

## 2017-12-28 RX ORDER — OLANZAPINE 20 MG/1
20 TABLET ORAL NIGHTLY
Qty: 30 TABLET | Refills: 0 | Status: SHIPPED | OUTPATIENT
Start: 2017-12-28 | End: 2017-12-28

## 2017-12-28 RX ORDER — DIAZEPAM 10 MG/1
10 TABLET ORAL 3 TIMES DAILY PRN
Qty: 90 TABLET | Refills: 0 | Status: SHIPPED | OUTPATIENT
Start: 2017-12-28 | End: 2018-02-05 | Stop reason: SDUPTHER

## 2017-12-28 RX ORDER — OLANZAPINE 20 MG/1
20 TABLET, ORALLY DISINTEGRATING ORAL NIGHTLY
Qty: 30 TABLET | Refills: 0 | Status: SHIPPED | OUTPATIENT
Start: 2017-12-28 | End: 2017-12-28 | Stop reason: SDUPTHER

## 2017-12-28 RX ORDER — OLANZAPINE 20 MG/1
20 TABLET, ORALLY DISINTEGRATING ORAL NIGHTLY
Qty: 30 TABLET | Refills: 0 | Status: SHIPPED | OUTPATIENT
Start: 2017-12-28 | End: 2018-01-29 | Stop reason: SDUPTHER

## 2017-12-28 NOTE — PROGRESS NOTES
"Subjective   Rani Martins is a 47 y.o. female who is here today for medication management follow up.    Chief Complaint:  bipolar    HPI:  History of Present Illness   Patient presents with ongoing mood issues.   Nacho continues to have frequent mood liability evevation and depression.  She reports great concern over her weight gain.  She is requesting to change her antipsychotic.  Patient has however been doing well on Zyprexa she has been stable and remaining out of the hospital.  She continues to have difficulty with concentration and focus.  She reports some symptoms of depression including isolation, hopelessness, helplessness, feeling like she is worthless.  She reports vague, passive any thoughts to harm self or others.   She adamantly denies any intent or plan.   She reports ongoing issues with chronic pain  Depression rated 6/10.   Patient is also experiencing increased anxiety with episodes of panic relieved by valium.  She remains unable to work due to severe distraction and psychosis.    The following portions of the patient's history were reviewed and updated as appropriate: allergies, current medications, past family history, past medical history, past social history, past surgical history and problem list.    Review of Systems   Constitutional: Positive for fatigue.   Respiratory: Negative for shortness of breath.    Cardiovascular: Negative for chest pain.   Gastrointestinal: Negative for nausea.   Musculoskeletal: Positive for back pain and myalgias. Negative for neck stiffness.   Neurological: Negative for tremors.   All other systems reviewed and are negative.      Objective   Physical Exam   Constitutional: She appears well-developed and well-nourished. No distress.   Neurological: She is alert. Coordination and gait normal.   Vitals reviewed.    Height 154 cm (60.63\"), weight 64.9 kg (143 lb).    Allergies   Allergen Reactions   • Lyrica [Pregabalin]    • Cymbalta [Duloxetine Hcl]    • " Erythromycin    • Lithium    • Toradol [Ketorolac Tromethamine]    • Tramadol    • Penicillins Rash       Current Medications:   Current Outpatient Prescriptions   Medication Sig Dispense Refill   • diazePAM (VALIUM) 10 MG tablet Take 1 tablet by mouth 3 (Three) Times a Day As Needed for Anxiety. 90 tablet 0   • FLUoxetine (PROzac) 20 MG capsule Take 1 capsule by mouth Daily. 30 capsule 1   • methocarbamol (ROBAXIN) 750 MG tablet      • mirtazapine (REMERON) 15 MG tablet Take 1 tablet by mouth Every Night. 30 tablet 0   • OLANZapine (zyPREXA) 20 MG tablet Take 1 tablet by mouth Every Night. 30 tablet 0   • OLANZapine zydis (ZYPREXA ZYDIS) 20 MG disintegrating tablet Take 1 tablet by mouth Every Night. 30 tablet 0   • oxyCODONE-acetaminophen (PERCOCET) 7.5-325 MG per tablet Take 1 tablet by mouth Every 6 (Six) Hours As Needed.       No current facility-administered medications for this visit.        Mental Status Exam:   Hygiene:   good  Cooperation:  Cooperative  Eye Contact:  Good  Psychomotor Behavior:  Appropriate  Affect:  Full range  Hopelessness: Denies  Speech:  slurred  Thought Process:  Goal directed  Thought Content:  Normal  Suicidal:  None  Homicidal:  None  Hallucinations:  None  Delusion:  None  Memory:  Intact  Orientation:  Person, Place, Time and Situation  Reliability:  fair  Insight:  Fair  Judgement:  Fair  Impulse Control:  Fair  Physical/Medical Issues:  Yes, fibromyalgia  No looseness of associations  Fund of knowledge average  Language intact    Assessment/Plan   Problems Addressed this Visit     None      Visit Diagnoses     Bipolar 1 disorder, depressed, moderate    -  Primary    Relevant Medications    diazePAM (VALIUM) 10 MG tablet    FLUoxetine (PROzac) 20 MG capsule    OLANZapine (zyPREXA) 20 MG tablet    Generalized anxiety disorder        Relevant Medications    diazePAM (VALIUM) 10 MG tablet    FLUoxetine (PROzac) 20 MG capsule    OLANZapine (zyPREXA) 20 MG tablet      ·  extensive  education was provided to the patient regarding her faulty beliefs systems.  Patient was also normalized and her frustration we did thoroughly discussed the risk of medication changes potential risk for worsening yaima and psychosis.  Patient will attempt to exercise and monitor her diet for the next 3-4 weeks and we will consider switching to rexulti  ·   ·   ·   ·  zyprexa ODT up to 20mg qhs for mood stabilization & sleep.   · Continue Valium 10mg TID PRN anxiety. /prozac..  · Return to clinic for medication management in 4-6 weeks.  *Patient was instructed on medication side effects, benefits, and also of no treatment.  Patient was given an explanation regarding potential for increased risk of diabetes, lipids, and weight gain.  Labs will be assessed as clinically indicated.  Diet was discussed especially healthy diet choices and increasing activity and exercise.  Patient was strongly urged to continue weight maintance or weight loss efforts.  Patient reported verbalized understanding of instructions  We discussed risks, benefits, and side effects of the above medication and the patient was agreeable with the plan.Patient is being prescribed a controlled substance as part of treatment plan. Patient has been educated of appropriate use of the medications, including risk of somnolence, limited ability to drive and/or work safely, and potential for dependence, respiratory depression and overdose. Patient is also informed that the medication are to be used by the patient only- avoid any combined use of ETOH or other substances unless prescribed.     Rafy report of past 12 months reviewed with no new issues. Patient reports taking medication as prescribed.  Patient denies any abuse/misuse of medication.  Patient denies any other substance use issues.  No apparent substance related issues.  Patient appropriate to continue with medication.  Reinforced risk of medication.    No Follow-up on file.  The patient was  instructed to call clinic as needed or go to ER if in crisis.

## 2018-01-22 DIAGNOSIS — F41.1 GENERALIZED ANXIETY DISORDER: ICD-10-CM

## 2018-01-22 DIAGNOSIS — F31.32 BIPOLAR 1 DISORDER, DEPRESSED, MODERATE (HCC): Primary | ICD-10-CM

## 2018-01-29 ENCOUNTER — APPOINTMENT (OUTPATIENT)
Dept: LAB | Facility: HOSPITAL | Age: 48
End: 2018-01-29

## 2018-01-29 ENCOUNTER — OFFICE VISIT (OUTPATIENT)
Dept: PSYCHIATRY | Facility: CLINIC | Age: 48
End: 2018-01-29

## 2018-01-29 VITALS
HEART RATE: 72 BPM | DIASTOLIC BLOOD PRESSURE: 57 MMHG | BODY MASS INDEX: 26.24 KG/M2 | HEIGHT: 61 IN | SYSTOLIC BLOOD PRESSURE: 96 MMHG | WEIGHT: 139 LBS

## 2018-01-29 DIAGNOSIS — F31.32 BIPOLAR 1 DISORDER, DEPRESSED, MODERATE (HCC): Primary | ICD-10-CM

## 2018-01-29 DIAGNOSIS — F41.1 GENERALIZED ANXIETY DISORDER: ICD-10-CM

## 2018-01-29 DIAGNOSIS — Z79.899 MEDICATION MANAGEMENT: ICD-10-CM

## 2018-01-29 LAB
ANION GAP SERPL CALCULATED.3IONS-SCNC: 6 MMOL/L (ref 3.6–11.2)
BASOPHILS # BLD AUTO: 0.03 10*3/MM3 (ref 0–0.3)
BASOPHILS NFR BLD AUTO: 0.4 % (ref 0–2)
BUN BLD-MCNC: 20 MG/DL (ref 7–21)
BUN/CREAT SERPL: 26.7 (ref 7–25)
CALCIUM SPEC-SCNC: 9.2 MG/DL (ref 7.7–10)
CHLORIDE SERPL-SCNC: 109 MMOL/L (ref 99–112)
CHOLEST SERPL-MCNC: 209 MG/DL (ref 0–200)
CO2 SERPL-SCNC: 27 MMOL/L (ref 24.3–31.9)
CREAT BLD-MCNC: 0.75 MG/DL (ref 0.43–1.29)
DEPRECATED RDW RBC AUTO: 44.7 FL (ref 37–54)
EOSINOPHIL # BLD AUTO: 0.79 10*3/MM3 (ref 0–0.7)
EOSINOPHIL NFR BLD AUTO: 11.5 % (ref 0–5)
ERYTHROCYTE [DISTWIDTH] IN BLOOD BY AUTOMATED COUNT: 13.6 % (ref 11.5–14.5)
GFR SERPL CREATININE-BSD FRML MDRD: 84 ML/MIN/1.73
GLUCOSE BLD-MCNC: 114 MG/DL (ref 70–110)
HBA1C MFR BLD: 5 % (ref 4.5–5.7)
HCT VFR BLD AUTO: 40.1 % (ref 37–47)
HGB BLD-MCNC: 13 G/DL (ref 12–16)
IMM GRANULOCYTES # BLD: 0 10*3/MM3 (ref 0–0.03)
IMM GRANULOCYTES NFR BLD: 0 % (ref 0–0.5)
LYMPHOCYTES # BLD AUTO: 2.78 10*3/MM3 (ref 1–3)
LYMPHOCYTES NFR BLD AUTO: 40.3 % (ref 21–51)
MCH RBC QN AUTO: 30.4 PG (ref 27–33)
MCHC RBC AUTO-ENTMCNC: 32.4 G/DL (ref 33–37)
MCV RBC AUTO: 93.7 FL (ref 80–94)
MONOCYTES # BLD AUTO: 0.6 10*3/MM3 (ref 0.1–0.9)
MONOCYTES NFR BLD AUTO: 8.7 % (ref 0–10)
NEUTROPHILS # BLD AUTO: 2.69 10*3/MM3 (ref 1.4–6.5)
NEUTROPHILS NFR BLD AUTO: 39.1 % (ref 30–70)
OSMOLALITY SERPL CALC.SUM OF ELEC: 286.6 MOSM/KG (ref 273–305)
PLATELET # BLD AUTO: 280 10*3/MM3 (ref 130–400)
PMV BLD AUTO: 10.1 FL (ref 6–10)
POTASSIUM BLD-SCNC: 3.6 MMOL/L (ref 3.5–5.3)
RBC # BLD AUTO: 4.28 10*6/MM3 (ref 4.2–5.4)
SODIUM BLD-SCNC: 142 MMOL/L (ref 135–153)
WBC NRBC COR # BLD: 6.89 10*3/MM3 (ref 4.5–12.5)

## 2018-01-29 PROCEDURE — 36415 COLL VENOUS BLD VENIPUNCTURE: CPT | Performed by: NURSE PRACTITIONER

## 2018-01-29 PROCEDURE — 82465 ASSAY BLD/SERUM CHOLESTEROL: CPT | Performed by: NURSE PRACTITIONER

## 2018-01-29 PROCEDURE — 85025 COMPLETE CBC W/AUTO DIFF WBC: CPT | Performed by: NURSE PRACTITIONER

## 2018-01-29 PROCEDURE — 99214 OFFICE O/P EST MOD 30 MIN: CPT | Performed by: NURSE PRACTITIONER

## 2018-01-29 PROCEDURE — 83036 HEMOGLOBIN GLYCOSYLATED A1C: CPT | Performed by: NURSE PRACTITIONER

## 2018-01-29 PROCEDURE — 80048 BASIC METABOLIC PNL TOTAL CA: CPT | Performed by: NURSE PRACTITIONER

## 2018-01-29 RX ORDER — OLANZAPINE 20 MG/1
20 TABLET, ORALLY DISINTEGRATING ORAL NIGHTLY
Qty: 30 TABLET | Refills: 0 | Status: SHIPPED | OUTPATIENT
Start: 2018-01-29 | End: 2018-02-27 | Stop reason: SDDI

## 2018-01-29 RX ORDER — FLUOXETINE HYDROCHLORIDE 40 MG/1
40 CAPSULE ORAL DAILY
Qty: 30 CAPSULE | Refills: 0 | Status: SHIPPED | OUTPATIENT
Start: 2018-01-29 | End: 2018-02-27 | Stop reason: SDUPTHER

## 2018-02-05 DIAGNOSIS — F31.32 BIPOLAR 1 DISORDER, DEPRESSED, MODERATE (HCC): ICD-10-CM

## 2018-02-05 DIAGNOSIS — F41.1 GENERALIZED ANXIETY DISORDER: ICD-10-CM

## 2018-02-05 RX ORDER — DIAZEPAM 10 MG/1
10 TABLET ORAL 3 TIMES DAILY PRN
Qty: 90 TABLET | Refills: 0 | Status: SHIPPED | OUTPATIENT
Start: 2018-02-05 | End: 2018-02-27 | Stop reason: SDUPTHER

## 2018-02-06 RX ORDER — OLANZAPINE 20 MG/1
TABLET, ORALLY DISINTEGRATING ORAL
Qty: 30 TABLET | Refills: 0 | Status: SHIPPED | OUTPATIENT
Start: 2018-02-06 | End: 2018-02-27 | Stop reason: SDDI

## 2018-02-27 ENCOUNTER — OFFICE VISIT (OUTPATIENT)
Dept: PSYCHIATRY | Facility: CLINIC | Age: 48
End: 2018-02-27

## 2018-02-27 VITALS
DIASTOLIC BLOOD PRESSURE: 65 MMHG | HEIGHT: 61 IN | SYSTOLIC BLOOD PRESSURE: 123 MMHG | HEART RATE: 85 BPM | BODY MASS INDEX: 23.98 KG/M2 | WEIGHT: 127 LBS

## 2018-02-27 DIAGNOSIS — F31.32 BIPOLAR 1 DISORDER, DEPRESSED, MODERATE (HCC): Primary | ICD-10-CM

## 2018-02-27 DIAGNOSIS — F41.1 GENERALIZED ANXIETY DISORDER: ICD-10-CM

## 2018-02-27 DIAGNOSIS — Z79.899 MEDICATION MANAGEMENT: ICD-10-CM

## 2018-02-27 LAB
AMPHETAMINE CUT-OFF: 1000
BENZODIAZIPINE CUT-OFF: 300
BUPRENORPHINE CUT-OFF: 10
COCAINE CUT-OFF: 300
EXTERNAL AMPHETAMINE SCREEN URINE: POSITIVE
EXTERNAL BENZODIAZEPINE SCREEN URINE: POSITIVE
EXTERNAL BUPRENORPHINE SCREEN URINE: NEGATIVE
EXTERNAL COCAINE SCREEN URINE: NEGATIVE
EXTERNAL MDMA: NEGATIVE
EXTERNAL METHADONE SCREEN URINE: NEGATIVE
EXTERNAL METHAMPHETAMINE SCREEN URINE: NEGATIVE
EXTERNAL OPIATES SCREEN URINE: NEGATIVE
EXTERNAL OXYCODONE SCREEN URINE: POSITIVE
EXTERNAL THC SCREEN URINE: NEGATIVE
MDMA CUT-OFF: 500
METHADONE CUT-OFF: 300
METHAMPHETAMINE CUT-OFF: 1000
OPIATES CUT-OFF: 300
OXYCODONE CUT-OFF: 100
THC CUT-OFF: 50

## 2018-02-27 PROCEDURE — 99214 OFFICE O/P EST MOD 30 MIN: CPT | Performed by: NURSE PRACTITIONER

## 2018-02-27 RX ORDER — TIZANIDINE 2 MG/1
TABLET ORAL
Status: ON HOLD | COMMUNITY
Start: 2018-02-13 | End: 2020-06-27

## 2018-02-27 RX ORDER — FLUOXETINE HYDROCHLORIDE 40 MG/1
40 CAPSULE ORAL DAILY
Qty: 30 CAPSULE | Refills: 0 | Status: SHIPPED | OUTPATIENT
Start: 2018-02-27 | End: 2018-03-26

## 2018-02-27 RX ORDER — DIAZEPAM 10 MG/1
10 TABLET ORAL 3 TIMES DAILY PRN
Qty: 90 TABLET | Refills: 0 | Status: SHIPPED | OUTPATIENT
Start: 2018-02-27 | End: 2018-03-26 | Stop reason: SDUPTHER

## 2018-02-27 NOTE — PROGRESS NOTES
"Subjective   Rani Martins is a 47 y.o. female who is here today for medication management follow up.    Chief Complaint:  bipolar    HPI:  History of Present Illness   Patient presents with ongoing mood issues.   She has weaned herself off the zyprexa-states It was making her eat everything in \"sight\".  Paitent continues to have frequent mood liability evevation and depression.   She reports good sleep-6plus hours.  Her appetite is fair.   She continues to have difficulty with concentration and focus.  She reports some symptoms of depression including isolation, hopelessness, helplessness, feeling like she is worthless.  She denies any thoughts to harm self or others.   She adamantly denies any intent or plan.   She reports ongoing issues with chronic pain  Depression rated 6/10.   Patient is also experiencing increased anxiety with episodes of panic relieved by valium.  She remains unable to work due to severe distraction and psychosis.  Pill count was completed and patient has greater than 15 pills remaining in her bottle from previous prescription.  We'll also complete a urinary drug screen was patient's visit is completed.  Patient states she has been unable to void until after the visit.    The following portions of the patient's history were reviewed and updated as appropriate: allergies, current medications, past family history, past medical history, past social history, past surgical history and problem list.    Review of Systems   Constitutional: Positive for fatigue.   Respiratory: Negative for shortness of breath.    Cardiovascular: Negative for chest pain.   Gastrointestinal: Negative for nausea.   Musculoskeletal: Positive for back pain and myalgias. Negative for neck stiffness.   Neurological: Negative for tremors.   All other systems reviewed and are negative.      Objective   Physical Exam   Constitutional: She appears well-developed and well-nourished. No distress.   Neurological: She is alert. " "Coordination and gait normal.   Vitals reviewed.    Blood pressure 123/65, pulse 85, height 154 cm (60.63\"), weight 57.6 kg (127 lb).    Allergies   Allergen Reactions   • Lyrica [Pregabalin]    • Cymbalta [Duloxetine Hcl]    • Erythromycin    • Lithium    • Toradol [Ketorolac Tromethamine]    • Tramadol    • Penicillins Rash       Current Medications:   Current Outpatient Prescriptions   Medication Sig Dispense Refill   • diazePAM (VALIUM) 10 MG tablet Take 1 tablet by mouth 3 (Three) Times a Day As Needed for Anxiety. 90 tablet 0   • FLUoxetine (PROzac) 40 MG capsule Take 1 capsule by mouth Daily. 30 capsule 0   • OLANZapine zydis (ZYPREXA ZYDIS) 20 MG disintegrating tablet Take 1 tablet by mouth Every Night. 30 tablet 0   • OLANZapine zydis (zyPREXA) 20 MG disintegrating tablet DISSOLVE ONE TABLET ON THE TONGUE AT BEDTIME AS DIRECTED 30 tablet 0   • oxyCODONE-acetaminophen (PERCOCET) 7.5-325 MG per tablet Take 1 tablet by mouth Every 6 (Six) Hours As Needed.     • tiZANidine (ZANAFLEX) 2 MG tablet        No current facility-administered medications for this visit.        Mental Status Exam:   Hygiene:   good  Cooperation:  Cooperative  Eye Contact:  Good  Psychomotor Behavior:  Appropriate  Affect:  Full range  Hopelessness: Denies  Speech:  slurred  Thought Process:  Goal directed  Thought Content:  Normal  Suicidal:  None  Homicidal:  None  Hallucinations:  None  Delusion:  None  Memory:  Intact  Orientation:  Person, Place, Time and Situation  Reliability:  fair  Insight:  Fair  Judgement:  Fair  Impulse Control:  Fair  Physical/Medical Issues:  Yes, fibromyalgia  No looseness of associations  Fund of knowledge average  Language intact    Assessment/Plan   Problems Addressed this Visit     None      Visit Diagnoses     Bipolar 1 disorder, depressed, moderate    -  Primary    Relevant Medications    FLUoxetine (PROzac) 40 MG capsule    diazePAM (VALIUM) 10 MG tablet    Generalized anxiety disorder        " Relevant Medications    FLUoxetine (PROzac) 40 MG capsule    diazePAM (VALIUM) 10 MG tablet    Medication management        Relevant Orders    [facility] Drug Screen      .  Patient has had long history of stopping medications abruptly.  Will assess lab results and possibly start depakote.  Patient was educated extensively regarding risk of yaima with no mood stablizer.  Patient appears poorly insightful into her illness or need to have medication on board.   *Patient was instructed on medication side effects, benefits, and   also of no treatment. We discussed risks, benefits, and side effects of the above medication and the patient was agreeable with the plan.Patient is being prescribed a controlled substance as part of treatment plan. Patient has been educated of appropriate use of the medications, including risk of somnolence, limited ability to drive and/or work safely, and potential for dependence, respiratory depression and overdose. Patient is also informed that the medication are to be used by the patient only- avoid any combined use of ETOH or other substances unless prescribed.     Rafy report of past 12 months reviewed with no new issues. Patient reports taking medication as prescribed.  Patient denies any abuse/misuse of medication.  Patient denies any other substance use issues.  No apparent substance related issues.  Patient appropriate to continue with medication.  Reinforced risk of medication.  Patient and mother were provided extensive education regarding signs and symptoms of yaima or hypomania.  Both were instructed to immediately call the clinic for any worsening symptoms.  Again patient was provided extensive education regarding the risk of no mood stabilizer with her diagnosis and condition.        Return in about 4 weeks (around 3/27/2018).  The patient was instructed to call clinic as needed or go to ER if in crisis.

## 2018-03-26 ENCOUNTER — OFFICE VISIT (OUTPATIENT)
Dept: PSYCHIATRY | Facility: CLINIC | Age: 48
End: 2018-03-26

## 2018-03-26 VITALS
WEIGHT: 119 LBS | DIASTOLIC BLOOD PRESSURE: 61 MMHG | SYSTOLIC BLOOD PRESSURE: 105 MMHG | HEART RATE: 69 BPM | HEIGHT: 61 IN | BODY MASS INDEX: 22.47 KG/M2

## 2018-03-26 DIAGNOSIS — F31.32 BIPOLAR 1 DISORDER, DEPRESSED, MODERATE (HCC): Primary | ICD-10-CM

## 2018-03-26 DIAGNOSIS — F41.1 GENERALIZED ANXIETY DISORDER: ICD-10-CM

## 2018-03-26 PROCEDURE — 99214 OFFICE O/P EST MOD 30 MIN: CPT | Performed by: NURSE PRACTITIONER

## 2018-03-26 RX ORDER — FLUOXETINE HYDROCHLORIDE 40 MG/1
40 CAPSULE ORAL DAILY
Qty: 30 CAPSULE | Refills: 0 | Status: SHIPPED | OUTPATIENT
Start: 2018-03-26 | End: 2018-04-30 | Stop reason: SDUPTHER

## 2018-03-26 RX ORDER — DIAZEPAM 10 MG/1
10 TABLET ORAL 3 TIMES DAILY PRN
Qty: 90 TABLET | Refills: 0 | Status: SHIPPED | OUTPATIENT
Start: 2018-03-26 | End: 2018-04-30 | Stop reason: SDUPTHER

## 2018-03-26 NOTE — PROGRESS NOTES
"Subjective   Rani Martins is a 47 y.o. female who is here today for medication management follow up.    Chief Complaint:  bipolar    HPI:  History of Present Illness   Patient presents with ongoing mood issues.   Nacho continues to have frequent hallicinations-seeing things, hearing voices.  She is having difficulty with distraction, poor concentration, inability to maintain focus on subject.  She is complaining of significant nausea and weight loss. Patient had been doing well on Zyprexa -however she is has increased weight and stopped taking.  She continues to have difficulty with concentration and focus.  She reports some symptoms of depression including isolation, hopelessness, helplessness, feeling like she is worthless.  She reports vague, passive any thoughts to harm self or others.   She adamantly denies any intent or plan.   She reports ongoing issues with chronic pain  Depression rated 6/10.   Patient is also experiencing increased anxiety with episodes of panic relieved by valium.  She remains unable to work due to severe distraction and psychosis.    The following portions of the patient's history were reviewed and updated as appropriate: allergies, current medications, past family history, past medical history, past social history, past surgical history and problem list.    Review of Systems   Constitutional: Positive for fatigue.   Respiratory: Negative for shortness of breath.    Cardiovascular: Negative for chest pain.   Gastrointestinal: Negative for nausea.   Musculoskeletal: Positive for back pain and myalgias. Negative for neck stiffness.   Neurological: Negative for tremors.   All other systems reviewed and are negative.      Objective   Physical Exam   Constitutional: She appears well-developed and well-nourished. No distress.   Neurological: She is alert. Coordination and gait normal.   Vitals reviewed.    Blood pressure 105/61, pulse 69, height 154 cm (60.63\"), weight 54 kg (119 " lb).    Allergies   Allergen Reactions   • Lyrica [Pregabalin]    • Cymbalta [Duloxetine Hcl]    • Erythromycin    • Lithium    • Toradol [Ketorolac Tromethamine]    • Tramadol    • Penicillins Rash       Current Medications:   Current Outpatient Prescriptions   Medication Sig Dispense Refill   • diazePAM (VALIUM) 10 MG tablet Take 1 tablet by mouth 3 (Three) Times a Day As Needed for Anxiety. 90 tablet 0   • FLUoxetine (PROzac) 40 MG capsule Take 1 capsule by mouth Daily. 30 capsule 0   • oxyCODONE-acetaminophen (PERCOCET) 7.5-325 MG per tablet Take 1 tablet by mouth Every 6 (Six) Hours As Needed.     • tiZANidine (ZANAFLEX) 2 MG tablet        No current facility-administered medications for this visit.        Mental Status Exam:   Hygiene:   good  Cooperation:  Cooperative  Eye Contact:  Good  Psychomotor Behavior:  Appropriate  Affect:  Full range  Hopelessness: Denies  Speech:  slurred  Thought Process:  Goal directed  Thought Content:  Normal  Suicidal:  None  Homicidal:  None  Hallucinations:  None  Delusion:  None  Memory:  Intact  Orientation:  Person, Place, Time and Situation  Reliability:  fair  Insight:  Fair  Judgement:  Fair  Impulse Control:  Fair  Physical/Medical Issues:  Yes, fibromyalgia  No looseness of associations  Fund of knowledge average  Language intact    Assessment/Plan   Problems Addressed this Visit     None      Visit Diagnoses     Bipolar 1 disorder, depressed, moderate    -  Primary    Relevant Medications    Cariprazine HCl (VRAYLAR) 1.5 MG capsule capsule    FLUoxetine (PROzac) 40 MG capsule    diazePAM (VALIUM) 10 MG tablet    Generalized anxiety disorder        Relevant Medications    Cariprazine HCl (VRAYLAR) 1.5 MG capsule capsule    FLUoxetine (PROzac) 40 MG capsule    diazePAM (VALIUM) 10 MG tablet      · Patient will be trialed on vraylar for symptom management.  Has failed  · Continue Valium 10mg TID PRN anxiety. /prozac..  · Return to clinic for medication management in  4-6 weeks.  Patient was instructed on medication side effects, benefits, and   also of no treatment.  Patient was given an explanation regarding potential for increased risk of diabetes, lipids, and weight gain.  Labs will be assessed as clinically indicated.  Diet was discussed especially healthy diet choices and increasing activity and exercise.  Patient was strongly urged to continue weight maintance or weight loss efforts.  Patient reported verbalized understanding of instructions  We discussed risks, benefits, and side effects of the above medication and the patient was agreeable with the plan.Patient is being prescribed a controlled substance as part of treatment plan. Patient has been educated of appropriate use of the medications, including risk of somnolence, limited ability to drive and/or work safely, and potential for dependence, respiratory depression and overdose. Patient is also informed that the medication are to be used by the patient only- avoid any combined use of ETOH or other substances unless prescribed.     Rafy report of past 12 months reviewed with no new issues. Patient reports taking medication as prescribed.  Patient denies any abuse/misuse of medication.  Patient denies any other substance use issues.  No apparent substance related issues.  Patient appropriate to continue with medication.  Reinforced risk of medication.    Return in about 2 weeks (around 4/9/2018).  The patient was instructed to call clinic as needed or go to ER if in crisis.

## 2018-04-16 ENCOUNTER — OFFICE VISIT (OUTPATIENT)
Dept: PSYCHIATRY | Facility: CLINIC | Age: 48
End: 2018-04-16

## 2018-04-16 VITALS
WEIGHT: 119 LBS | BODY MASS INDEX: 22.47 KG/M2 | HEIGHT: 61 IN | SYSTOLIC BLOOD PRESSURE: 121 MMHG | DIASTOLIC BLOOD PRESSURE: 71 MMHG | HEART RATE: 60 BPM

## 2018-04-16 DIAGNOSIS — F31.89 SEVERE BIPOLAR AFFECTIVE DISORDER WITH PSYCHOSIS (HCC): Primary | ICD-10-CM

## 2018-04-16 PROCEDURE — 99214 OFFICE O/P EST MOD 30 MIN: CPT | Performed by: NURSE PRACTITIONER

## 2018-04-16 RX ORDER — ZIPRASIDONE HYDROCHLORIDE 40 MG/1
40 CAPSULE ORAL 2 TIMES DAILY WITH MEALS
Qty: 60 CAPSULE | Refills: 0 | Status: SHIPPED | OUTPATIENT
Start: 2018-04-16 | End: 2018-04-30 | Stop reason: SDUPTHER

## 2018-04-16 NOTE — PROGRESS NOTES
"Subjective   Rani Martins is a 47 y.o. female who is here today for medication management follow up. presents with her mother with whom she gives permission to speak to.     Chief Complaint:  hallucinations    HPI:  History of Present Illness   Patient presents with ongoing mood issues. States she never started vraylar that it was never approved.  Still seeing things like \"cats\" when they aren't there.  She also states that at times she sees different colors in the jessika.  Denies seeing any monsters.  Does hear voices periodically.  Does have breast pain in which she states the voice tells her to cut her breast  which she has done in the past.  But she states at present she can tell the difference with this and completely knows to ignore the voice.  Denies any suicidal ideation or any thoughts to self-harm.  Denies any homicidal ideation.  Currently rates depression as a 2 out of 10 on a 1-10 scale with 10 being worst.  She states this is \"a good day\".  Anxiety is rated an 8 out of 10.Body mass index is 22.76 kg/m².  Patient has not lost any more weight since last visit.  Sleeping is adequate approximately 6-7 nights.  No nightmares.  No current new medical issues.     The following portions of the patient's history were reviewed and updated as appropriate: allergies, current medications, past family history, past medical history, past social history, past surgical history and problem list.    Review of Systems   Constitutional: Positive for fatigue.   Respiratory: Negative for shortness of breath.    Cardiovascular: Negative for chest pain.   Gastrointestinal: Negative for nausea.   Musculoskeletal: Positive for back pain and myalgias. Negative for neck stiffness.   Neurological: Negative for tremors.   Psychiatric/Behavioral: Positive for hallucinations. The patient is nervous/anxious.    All other systems reviewed and are negative.      Objective   Physical Exam   Constitutional: She appears well-developed and " "well-nourished. No distress.   HENT:   Head: Normocephalic and atraumatic.   Neck: Normal range of motion.   Neurological: She is alert. Coordination and gait normal.   Psychiatric: She has a normal mood and affect. Her behavior is normal. Judgment and thought content normal.   Vitals reviewed.    Blood pressure 121/71, pulse 60, height 154 cm (60.63\"), weight 54 kg (119 lb).    Allergies   Allergen Reactions   • Lyrica [Pregabalin]    • Cymbalta [Duloxetine Hcl]    • Erythromycin    • Lithium    • Toradol [Ketorolac Tromethamine]    • Tramadol    • Penicillins Rash       Current Medications:   Current Outpatient Prescriptions   Medication Sig Dispense Refill   • Cariprazine HCl (VRAYLAR) 1.5 MG capsule capsule Take 1 capsule by mouth Daily. 14 capsule 0   • diazePAM (VALIUM) 10 MG tablet Take 1 tablet by mouth 3 (Three) Times a Day As Needed for Anxiety. 90 tablet 0   • FLUoxetine (PROzac) 40 MG capsule Take 1 capsule by mouth Daily. 30 capsule 0   • oxyCODONE-acetaminophen (PERCOCET) 7.5-325 MG per tablet Take 1 tablet by mouth Every 6 (Six) Hours As Needed.     • tiZANidine (ZANAFLEX) 2 MG tablet      • ziprasidone (GEODON) 40 MG capsule Take 1 capsule by mouth 2 (Two) Times a Day With Meals. 60 capsule 0     No current facility-administered medications for this visit.        Mental Status Exam:   Hygiene:   good  Cooperation:  Cooperative  Eye Contact:  Good  Psychomotor Behavior:  Appropriate  Affect:  Full range  Hopelessness: Denies  Speech:  normal  Thought Process:  Goal directed  Thought Content:  Normal  Suicidal:  None  Homicidal:  None  Hallucinations:  None  Delusion:  None  Memory:  Intact  Orientation:  Person, Place, Time and Situation  Reliability:  fair  Insight:  Fair  Judgement:  Fair  Impulse Control:  Fair  Physical/Medical Issues:  Yes, fibromyalgia  No looseness of associations  Fund of knowledge average  Language intact    Assessment/Plan   Problems Addressed this Visit     None      Visit " Diagnoses     Severe bipolar affective disorder with psychosis    -  Primary    Relevant Medications    ziprasidone (GEODON) 40 MG capsule         Functionality: pt having significant impairment in important areas of daily functioning.  Prognosis: Guarded dependent on medication/follow up and treatment plan compliance.    At this visit I found out that patient did not ever received a VR AY LAR prescription as they stated that pharmacy was supposed to call and tell us in which I did not receive a message of this.  Patient has not had any of the medication therefore.  I am going to go ahead and start her on Geodon 40 mg twice a day today for her continuing hallucinations.  Continue the Prozac.  She is to also continue taking her Valium as prescribed.  Patient is agreeable with this plan.  Risks and benefits of medications including the antipsychotic group including weight gain and need to monitor lab work was discussed with patient.  Patient was once again counseled on the importance of not drinking any alcohol along with the benzo diazepam family of medications.  Patient is also aware of any thoughts to self-harm or suicidal ideation she is to tell her mother immediately and patient is to contact the [facility] or come to the emergency room as it is available 24 hours a day.  She verbalizes understanding of this.  Patient does state that she has an appointment with her primary care physician within the next week and will discuss her breast pain then.

## 2018-04-30 ENCOUNTER — OFFICE VISIT (OUTPATIENT)
Dept: PSYCHIATRY | Facility: CLINIC | Age: 48
End: 2018-04-30

## 2018-04-30 ENCOUNTER — TELEPHONE (OUTPATIENT)
Dept: PSYCHIATRY | Facility: CLINIC | Age: 48
End: 2018-04-30

## 2018-04-30 VITALS
SYSTOLIC BLOOD PRESSURE: 111 MMHG | WEIGHT: 117 LBS | BODY MASS INDEX: 22.09 KG/M2 | HEIGHT: 61 IN | HEART RATE: 83 BPM | DIASTOLIC BLOOD PRESSURE: 70 MMHG

## 2018-04-30 DIAGNOSIS — F31.89 SEVERE BIPOLAR AFFECTIVE DISORDER WITH PSYCHOSIS (HCC): ICD-10-CM

## 2018-04-30 DIAGNOSIS — F41.1 GENERALIZED ANXIETY DISORDER: ICD-10-CM

## 2018-04-30 PROCEDURE — 99214 OFFICE O/P EST MOD 30 MIN: CPT | Performed by: NURSE PRACTITIONER

## 2018-04-30 RX ORDER — ZIPRASIDONE HYDROCHLORIDE 40 MG/1
CAPSULE ORAL
Qty: 90 CAPSULE | Refills: 0 | Status: SHIPPED | OUTPATIENT
Start: 2018-04-30 | End: 2018-04-30 | Stop reason: SDUPTHER

## 2018-04-30 RX ORDER — ZIPRASIDONE HYDROCHLORIDE 60 MG/1
CAPSULE ORAL
Qty: 60 CAPSULE | Refills: 0 | Status: SHIPPED | OUTPATIENT
Start: 2018-04-30 | End: 2018-05-30 | Stop reason: SDUPTHER

## 2018-04-30 RX ORDER — FLUOXETINE HYDROCHLORIDE 40 MG/1
40 CAPSULE ORAL DAILY
Qty: 30 CAPSULE | Refills: 0 | Status: SHIPPED | OUTPATIENT
Start: 2018-04-30 | End: 2018-05-30 | Stop reason: SDUPTHER

## 2018-04-30 RX ORDER — DIAZEPAM 10 MG/1
10 TABLET ORAL 3 TIMES DAILY PRN
Qty: 90 TABLET | Refills: 0 | Status: SHIPPED | OUTPATIENT
Start: 2018-04-30 | End: 2018-05-30 | Stop reason: SDUPTHER

## 2018-04-30 NOTE — PROGRESS NOTES
Pharmacy contacted.  They said insurance would not pay for 3 Geodon tablets a day. Will change the prescription to 60mg BID.  Pharmacist stated she would inform the patient that she has not yet picked up her meds.

## 2018-04-30 NOTE — PROGRESS NOTES
"Subjective   Rani Martins is a 47 y.o. female who is here today for medication management follow up. presents with her mother with whom she gives permission to speak to.     Chief Complaint:  hallucinations    HPI:  Patient states that she is doing a little bit better.  States that she is still depressed at time rates it a 6 out of 10 on a 1/10 scale with 10 being worse.  Anxiety comes and goes.  The auditory hallucinations have stopped on the Geodon.  She is still hearing an occasional voice that is calling out her name on the medication.  Sleeping well approximately 7-8 hours at night.  Appetite is approximately the same.Body mass index is 22.38 kg/m².  Of note patient has lost 2 pounds since last visit.  She denies any current suicidal thoughts, homicidal thoughts, or any self harm.  No negative side effects to the medications.  History of Present Illness             Patient presents with ongoing mood issues. States she never started vraylar that it was never approved.  Still seeing things like \"cats\" when they aren't there.  She also states that at times she sees different colors in the jessika.  Denies seeing any monsters.  Does hear voices periodically.  Does have breast pain in which she states the voice tells her to cut her breast  which she has done in the past.  But she states at present she can tell the difference with this and completely knows to ignore the voice.  Denies any suicidal ideation or any thoughts to self-harm.  Denies any homicidal ideation.  Currently rates depression as a 2 out of 10 on a 1-10 scale with 10 being worst.  She states this is \"a good day\".  Anxiety is rated an 8 out of 10.Body mass index is 22.38 kg/m².  Patient has not lost any more weight since last visit.  Sleeping is adequate approximately 6-7 nights.  No nightmares.  No current new medical issues.     The following portions of the patient's history were reviewed and updated as appropriate: allergies, current medications, " "past family history, past medical history, past social history, past surgical history and problem list.    Review of Systems   Constitutional: Positive for fatigue. Negative for activity change and appetite change.   HENT: Negative.    Eyes: Negative for visual disturbance.   Respiratory: Negative.  Negative for shortness of breath.    Cardiovascular: Negative.  Negative for chest pain.   Gastrointestinal: Negative for nausea.   Endocrine: Negative.    Genitourinary: Negative.    Musculoskeletal: Positive for back pain and myalgias. Negative for arthralgias and neck stiffness.   Skin: Negative.    Allergic/Immunologic: Negative.    Neurological: Negative for dizziness, tremors, seizures and headaches.   Hematological: Negative.    Psychiatric/Behavioral: Positive for hallucinations. Negative for agitation, behavioral problems, confusion, decreased concentration, dysphoric mood, self-injury, sleep disturbance and suicidal ideas. The patient is nervous/anxious. The patient is not hyperactive.    All other systems reviewed and are negative.      Objective   Physical Exam   Constitutional: She appears well-developed and well-nourished. No distress.   HENT:   Head: Normocephalic and atraumatic.   Neck: Normal range of motion.   Neurological: She is alert. Coordination and gait normal.   Psychiatric: She has a normal mood and affect. Her behavior is normal. Judgment and thought content normal.   Vitals reviewed.    Blood pressure 111/70, pulse 83, height 154 cm (60.63\"), weight 53.1 kg (117 lb).    Allergies   Allergen Reactions   • Lyrica [Pregabalin]    • Cymbalta [Duloxetine Hcl]    • Erythromycin    • Lithium    • Toradol [Ketorolac Tromethamine]    • Tramadol    • Penicillins Rash       Current Medications:   Current Outpatient Prescriptions   Medication Sig Dispense Refill   • diazePAM (VALIUM) 10 MG tablet Take 1 tablet by mouth 3 (Three) Times a Day As Needed for Anxiety. 90 tablet 0   • FLUoxetine (PROzac) 40 MG " capsule Take 1 capsule by mouth Daily. 30 capsule 0   • oxyCODONE-acetaminophen (PERCOCET) 7.5-325 MG per tablet Take 1 tablet by mouth Every 6 (Six) Hours As Needed.     • tiZANidine (ZANAFLEX) 2 MG tablet      • ziprasidone (GEODON) 40 MG capsule 2 PO in the AM and 1 PO in the PM 90 capsule 0     No current facility-administered medications for this visit.        Mental Status Exam:   Hygiene:   good  Cooperation:  Cooperative  Eye Contact:  Good  Psychomotor Behavior:  Appropriate  Affect:  Full range  Hopelessness: Denies  Speech:  normal  Thought Process:  Goal directed  Thought Content:  Normal  Suicidal:  None  Homicidal:  None  Hallucinations:  None  Delusion:  None  Memory:  Intact  Orientation:  Person, Place, Time and Situation  Reliability:  fair  Insight:  Fair  Judgement:  Fair  Impulse Control:  Fair  Physical/Medical Issues:  Yes, fibromyalgia  No looseness of associations  Fund of knowledge average  Language intact    Assessment/Plan   Problems Addressed this Visit     None      Visit Diagnoses     Severe bipolar affective disorder with psychosis        Relevant Medications    ziprasidone (GEODON) 40 MG capsule    FLUoxetine (PROzac) 40 MG capsule    diazePAM (VALIUM) 10 MG tablet    Generalized anxiety disorder        Relevant Medications    ziprasidone (GEODON) 40 MG capsule    FLUoxetine (PROzac) 40 MG capsule    diazePAM (VALIUM) 10 MG tablet         Functionality: pt having significant impairment in important areas of daily functioning.  Prognosis: Guarded dependent on medication/follow up and treatment plan compliance.    Insurance would not cover broiler previously so patient is taking the Geodon 40 mg twice a day.  I believe this is helped her and therefore I'm going to increase it to 2 in the morning and 1 of a thing is I do not know that she needs 80 mg twice a day.  Discussed this with patient and will begin drawing some labs in the next couple months on her regarding her cholesterol.   Patient and mother discussed at length the risk of taking the benzodiazepines along with the opioids causing an increased risk of accidental overdose.  Patient states that she understands this and has taste the risk as she has to have these medications to function.  I did discuss with her my concern with her continual weight loss and that she continues one of the medication dosage will need to be decreased.  She is to also continue taking her Valium as prescribed.  Patient is agreeable with this plan.  Risks and benefits of medications including the antipsychotic group including weight gain and need to monitor lab work was discussed with patient.  Patient was once again counseled on the importance of not drinking any alcohol along with the benzo diazepam family of medications.  Patient is also aware of any thoughts to self-harm or suicidal ideation she is to tell her mother immediately and patient is to contact the [facility] or come to the emergency room as it is available 24 hours a day.  She verbalizes understanding of this.  Patient does state that she has an appointment with her primary care physician within the next week and will discuss her breast pain then.

## 2018-05-03 ENCOUNTER — HOSPITAL ENCOUNTER (OUTPATIENT)
Dept: ULTRASOUND IMAGING | Facility: HOSPITAL | Age: 48
Discharge: HOME | End: 2018-05-03
Admitting: PHYSICIAN ASSISTANT

## 2018-05-03 ENCOUNTER — HOSPITAL ENCOUNTER (OUTPATIENT)
Dept: MAMMOGRAPHY | Facility: HOSPITAL | Age: 48
Discharge: HOME | End: 2018-05-03

## 2018-05-03 DIAGNOSIS — N64.4 BREAST PAIN IN FEMALE: ICD-10-CM

## 2018-05-03 DIAGNOSIS — N64.4 BREAST PAIN: ICD-10-CM

## 2018-05-03 PROCEDURE — 77066 DX MAMMO INCL CAD BI: CPT

## 2018-05-03 PROCEDURE — 76642 ULTRASOUND BREAST LIMITED: CPT

## 2018-05-03 PROCEDURE — 77066 DX MAMMO INCL CAD BI: CPT | Performed by: RADIOLOGY

## 2018-05-03 PROCEDURE — 77062 BREAST TOMOSYNTHESIS BI: CPT | Performed by: RADIOLOGY

## 2018-05-03 PROCEDURE — 76642 ULTRASOUND BREAST LIMITED: CPT | Performed by: RADIOLOGY

## 2018-05-03 PROCEDURE — G0279 TOMOSYNTHESIS, MAMMO: HCPCS

## 2018-05-30 ENCOUNTER — OFFICE VISIT (OUTPATIENT)
Dept: PSYCHIATRY | Facility: CLINIC | Age: 48
End: 2018-05-30

## 2018-05-30 VITALS
SYSTOLIC BLOOD PRESSURE: 150 MMHG | DIASTOLIC BLOOD PRESSURE: 86 MMHG | BODY MASS INDEX: 23.03 KG/M2 | HEIGHT: 61 IN | WEIGHT: 122 LBS

## 2018-05-30 DIAGNOSIS — F31.89 SEVERE BIPOLAR AFFECTIVE DISORDER WITH PSYCHOSIS (HCC): ICD-10-CM

## 2018-05-30 DIAGNOSIS — F41.1 GENERALIZED ANXIETY DISORDER: ICD-10-CM

## 2018-05-30 PROCEDURE — 99214 OFFICE O/P EST MOD 30 MIN: CPT | Performed by: NURSE PRACTITIONER

## 2018-05-30 RX ORDER — FLUOXETINE HYDROCHLORIDE 40 MG/1
40 CAPSULE ORAL DAILY
Qty: 30 CAPSULE | Refills: 0 | Status: SHIPPED | OUTPATIENT
Start: 2018-05-30 | End: 2018-07-17 | Stop reason: SDUPTHER

## 2018-05-30 RX ORDER — PROPRANOLOL HYDROCHLORIDE 10 MG/1
10 TABLET ORAL 2 TIMES DAILY PRN
Qty: 60 TABLET | Refills: 0 | Status: SHIPPED | OUTPATIENT
Start: 2018-05-30 | End: 2018-07-17

## 2018-05-30 RX ORDER — DIAZEPAM 10 MG/1
10 TABLET ORAL 3 TIMES DAILY PRN
Qty: 90 TABLET | Refills: 0 | Status: SHIPPED | OUTPATIENT
Start: 2018-05-30 | End: 2018-06-27 | Stop reason: SDUPTHER

## 2018-05-30 RX ORDER — ZIPRASIDONE HYDROCHLORIDE 60 MG/1
CAPSULE ORAL
Qty: 60 CAPSULE | Refills: 0 | Status: SHIPPED | OUTPATIENT
Start: 2018-05-30 | End: 2018-07-17 | Stop reason: SDUPTHER

## 2018-05-30 NOTE — PROGRESS NOTES
"Subjective   Rani Martins is a 47 y.o. female who is here today for medication management follow up. presents with her mother with whom she gives permission to speak to.     Chief Complaint: \"I think I'm doing better\"    HPI: Patient states that she is doing better today.  Tolerating the medications without any negative side effects.  Rates her depression a 3 out of 10 with 10 being worse.  Rates anxiety as 9 out of 10 with 10 being worst.  States she does stay nervous a lot of the times and worries a lot still.  But she is only occasionally hearing a voice that yells out her name.  In the past she was hearing these voices at all times and this was daily.  She did have one visual hallucination where she saw a lady \"a couple of days ago I turned around and saw a lady standing there and she was not scary out blinked in turn my head and she was gone\".  She denies any suicidal thoughts or homicidal thoughts.  She is sleeping approximately 7-8 hours a night uninterrupted.  She denies any yaima symptoms of rapid speech increased energy, insomnia or impulsive behavior.Body mass index is 23.06 kg/m². She has gained 3 pounds since last office visit.  Her appetite is better.  Things are going really well with her boyfriend and she is very pleased with the medication regimen except for the continuance of the anxiety.  She denies extreme moodiness or irritability.        History of Present Illness   The following portions of the patient's history were reviewed and updated as appropriate: allergies, current medications, past family history, past medical history, past social history, past surgical history and problem list.    Review of Systems   Constitutional: Negative for activity change and appetite change.   HENT: Negative.    Eyes: Negative for visual disturbance.   Respiratory: Negative.  Negative for shortness of breath.    Cardiovascular: Negative.  Negative for chest pain.   Gastrointestinal: Negative for nausea. " "  Endocrine: Negative.    Genitourinary: Negative.    Musculoskeletal: Positive for back pain and myalgias. Negative for arthralgias and neck stiffness.   Skin: Negative.    Allergic/Immunologic: Negative.    Neurological: Negative for dizziness, tremors, seizures and headaches.   Hematological: Negative.    Psychiatric/Behavioral: Positive for hallucinations. Negative for agitation, behavioral problems, confusion, decreased concentration, dysphoric mood, self-injury, sleep disturbance and suicidal ideas. The patient is nervous/anxious. The patient is not hyperactive.    All other systems reviewed and are negative.      Objective   Physical Exam   Constitutional: She appears well-developed and well-nourished. No distress.   HENT:   Head: Normocephalic and atraumatic.   Neck: Normal range of motion.   Neurological: She is alert. Coordination and gait normal.   Psychiatric: She has a normal mood and affect. Her speech is normal and behavior is normal. Judgment and thought content normal. Cognition and memory are normal.   Vitals reviewed.    Blood pressure 150/86, height 154.9 cm (60.98\"), weight 55.3 kg (122 lb).    Allergies   Allergen Reactions   • Lyrica [Pregabalin]    • Cymbalta [Duloxetine Hcl]    • Erythromycin    • Lithium    • Toradol [Ketorolac Tromethamine]    • Tramadol    • Penicillins Rash       Current Medications:   Current Outpatient Prescriptions   Medication Sig Dispense Refill   • diazePAM (VALIUM) 10 MG tablet Take 1 tablet by mouth 3 (Three) Times a Day As Needed for Anxiety. 90 tablet 0   • FLUoxetine (PROzac) 40 MG capsule Take 1 capsule by mouth Daily. 30 capsule 0   • oxyCODONE-acetaminophen (PERCOCET) 7.5-325 MG per tablet Take 1 tablet by mouth Every 6 (Six) Hours As Needed.     • propranolol (INDERAL) 10 MG tablet Take 1 tablet by mouth 2 (Two) Times a Day As Needed (anxiety). 60 tablet 0   • tiZANidine (ZANAFLEX) 2 MG tablet      • ziprasidone (GEODON) 60 MG capsule 1 PO BID 60 capsule 0 "     No current facility-administered medications for this visit.        Mental Status Exam:   Hygiene:   good  Cooperation:  Cooperative  Eye Contact:  Good  Psychomotor Behavior:  Appropriate  Affect:  Full range  Hopelessness: Denies  Speech:  normal  Thought Process:  Goal directed  Thought Content:  Normal  Suicidal:  None  Homicidal:  None  Hallucinations:  Rare auditory and rare visual  Delusion:  None  Memory:  Intact  Orientation:  Person, Place, Time and Situation  Reliability:  fair  Insight:  Fair  Judgement:  Fair  Impulse Control:  Fair  Physical/Medical Issues:  Yes, fibromyalgia  No looseness of associations  Fund of knowledge average  Language intact    Assessment/Plan   Problems Addressed this Visit     None      Visit Diagnoses     Generalized anxiety disorder        Relevant Medications    propranolol (INDERAL) 10 MG tablet    diazePAM (VALIUM) 10 MG tablet    FLUoxetine (PROzac) 40 MG capsule    ziprasidone (GEODON) 60 MG capsule    Severe bipolar affective disorder with psychosis        Relevant Medications    diazePAM (VALIUM) 10 MG tablet    FLUoxetine (PROzac) 40 MG capsule    ziprasidone (GEODON) 60 MG capsule         Functionality: pt having significant impairment in important areas of daily functioning.  Prognosis: Guarded dependent on medication/follow up and treatment plan compliance.    At this time we'll go ahead and add a small dose of propranolol twice a day as needed for anxiety.  Instructed her that she can take this as needed or twice a day on an ongoing basis depending upon her level of anxiety.  At her next visit I discussed with her she is to come in fasting and we will check her blood levels to ensure that no metabolic syndrome is happening.  I once again discussed the dangers of the benzodiazepine use and the pain medication and patient states she has been on these for years and cannot do without and will end up in the hospital without her nerve medication.  Patient was once  again counseled on the importance of not drinking any alcohol along with the benzo diazepam family of medications.  Patient is also aware of any thoughts to self-harm or suicidal ideation she is to tell her mother immediately and patient is to contact the [facility] or come to the emergency room as it is available 24 hours a day.  She verbalizes understanding of this.  Patient does state that she has an appointment with her primary care physician within the next week and will discuss her breast pain then.

## 2018-06-25 DIAGNOSIS — F41.1 GENERALIZED ANXIETY DISORDER: ICD-10-CM

## 2018-06-25 DIAGNOSIS — F31.89 SEVERE BIPOLAR AFFECTIVE DISORDER WITH PSYCHOSIS (HCC): ICD-10-CM

## 2018-06-25 RX ORDER — FLUOXETINE HYDROCHLORIDE 40 MG/1
CAPSULE ORAL
Qty: 30 CAPSULE | Refills: 0 | OUTPATIENT
Start: 2018-06-25

## 2018-06-25 RX ORDER — DIAZEPAM 10 MG/1
TABLET ORAL
Qty: 90 TABLET | Refills: 0 | OUTPATIENT
Start: 2018-06-25

## 2018-06-25 RX ORDER — PROPRANOLOL HYDROCHLORIDE 10 MG/1
TABLET ORAL
Qty: 60 TABLET | Refills: 0 | OUTPATIENT
Start: 2018-06-25

## 2018-06-25 RX ORDER — ZIPRASIDONE HYDROCHLORIDE 60 MG/1
CAPSULE ORAL
Qty: 60 CAPSULE | Refills: 0 | OUTPATIENT
Start: 2018-06-25

## 2018-06-26 DIAGNOSIS — F41.1 GENERALIZED ANXIETY DISORDER: ICD-10-CM

## 2018-06-27 DIAGNOSIS — F41.1 GENERALIZED ANXIETY DISORDER: ICD-10-CM

## 2018-06-27 RX ORDER — DIAZEPAM 10 MG/1
10 TABLET ORAL 3 TIMES DAILY PRN
Qty: 90 TABLET | Refills: 0 | OUTPATIENT
Start: 2018-06-27

## 2018-06-28 RX ORDER — DIAZEPAM 10 MG/1
10 TABLET ORAL 3 TIMES DAILY PRN
Qty: 90 TABLET | Refills: 0 | Status: SHIPPED | OUTPATIENT
Start: 2018-06-28 | End: 2018-07-26 | Stop reason: SDUPTHER

## 2018-06-28 NOTE — TELEPHONE ENCOUNTER
Tried to call patient left  to call me back, I know the other day when she called requesting refill she had stated she forgot about her apt.

## 2018-07-17 ENCOUNTER — OFFICE VISIT (OUTPATIENT)
Dept: PSYCHIATRY | Facility: CLINIC | Age: 48
End: 2018-07-17

## 2018-07-17 VITALS
HEART RATE: 92 BPM | SYSTOLIC BLOOD PRESSURE: 118 MMHG | WEIGHT: 123 LBS | HEIGHT: 61 IN | DIASTOLIC BLOOD PRESSURE: 78 MMHG | BODY MASS INDEX: 23.22 KG/M2

## 2018-07-17 DIAGNOSIS — F41.1 GENERALIZED ANXIETY DISORDER: ICD-10-CM

## 2018-07-17 DIAGNOSIS — F31.89 SEVERE BIPOLAR AFFECTIVE DISORDER WITH PSYCHOSIS (HCC): Primary | ICD-10-CM

## 2018-07-17 PROCEDURE — 99214 OFFICE O/P EST MOD 30 MIN: CPT | Performed by: NURSE PRACTITIONER

## 2018-07-17 RX ORDER — FLUOXETINE HYDROCHLORIDE 40 MG/1
40 CAPSULE ORAL DAILY
Qty: 30 CAPSULE | Refills: 0 | Status: SHIPPED | OUTPATIENT
Start: 2018-07-17 | End: 2018-08-28 | Stop reason: SDUPTHER

## 2018-07-17 RX ORDER — HYDROXYZINE HYDROCHLORIDE 25 MG/1
25 TABLET, FILM COATED ORAL 3 TIMES DAILY PRN
Qty: 90 TABLET | Refills: 0 | Status: SHIPPED | OUTPATIENT
Start: 2018-07-17 | End: 2018-10-04 | Stop reason: SDUPTHER

## 2018-07-17 RX ORDER — ZIPRASIDONE HYDROCHLORIDE 60 MG/1
CAPSULE ORAL
Qty: 60 CAPSULE | Refills: 0 | Status: SHIPPED | OUTPATIENT
Start: 2018-07-17 | End: 2018-09-05 | Stop reason: SDUPTHER

## 2018-07-17 NOTE — PROGRESS NOTES
"Subjective   Rani Martins is a 47 y.o. female who is here today for medication management follow up. presents with her mother with whom she gives permission to speak in front of.     Chief Complaint: \"I am nervous still\"  HPI: Pt says she is doing well.  Rates depression a 3/10 with 10 being worse.  Says anxiety is higher.  Stays nervous AAT.  Propanolol did not help.  Pt is sleeping 8 hours nightly.  Denies suicidal ideation, homicidal ideation.  Still occasionally has auditory and visual hallucinations but very rarely.  Body mass index is 23.24 kg/m². Denies any weight changes or appetite changes.  No new medical stressors. Pt had some increasing anxiety after an arrest over a bench warrant.  Denies hopelessness.  Pt is very happy in a relationship with someone over a year.  She played me a song they sang together on her phone.   Denies any irritability or mood changes.       History of Present Illness   The following portions of the patient's history were reviewed and updated as appropriate: allergies, current medications, past family history, past medical history, past social history, past surgical history and problem list.    Review of Systems   Constitutional: Negative for activity change and appetite change.   HENT: Negative.    Eyes: Negative for visual disturbance.   Respiratory: Negative.  Negative for shortness of breath.    Cardiovascular: Negative.  Negative for chest pain.   Gastrointestinal: Negative for nausea.   Endocrine: Negative.    Genitourinary: Negative.    Musculoskeletal: Positive for back pain and myalgias. Negative for arthralgias and neck stiffness.   Skin: Negative.    Allergic/Immunologic: Negative.    Neurological: Negative for dizziness, tremors, seizures and headaches.   Hematological: Negative.    Psychiatric/Behavioral: Positive for hallucinations. Negative for agitation, behavioral problems, confusion, decreased concentration, dysphoric mood, self-injury, sleep disturbance and " "suicidal ideas. The patient is nervous/anxious. The patient is not hyperactive.    All other systems reviewed and are negative.      Objective   Physical Exam   Constitutional: She appears well-developed and well-nourished. No distress.   HENT:   Head: Normocephalic and atraumatic.   Neck: Normal range of motion.   Neurological: She is alert. Coordination and gait normal.   Psychiatric: She has a normal mood and affect. Her behavior is normal. Judgment and thought content normal. Her speech is rapid and/or pressured. Cognition and memory are normal.   Vitals reviewed.    Blood pressure 118/78, pulse 92, height 154.9 cm (61\"), weight 55.8 kg (123 lb).    Allergies   Allergen Reactions   • Lyrica [Pregabalin]    • Cymbalta [Duloxetine Hcl]    • Erythromycin    • Lithium    • Toradol [Ketorolac Tromethamine]    • Tramadol    • Penicillins Rash       Current Medications:   Current Outpatient Prescriptions   Medication Sig Dispense Refill   • diazePAM (VALIUM) 10 MG tablet Take 1 tablet by mouth 3 (Three) Times a Day As Needed for Anxiety. 90 tablet 0   • FLUoxetine (PROzac) 40 MG capsule Take 1 capsule by mouth Daily. 30 capsule 0   • hydrOXYzine (ATARAX) 25 MG tablet Take 1 tablet by mouth 3 (Three) Times a Day As Needed for Anxiety. 90 tablet 0   • oxyCODONE-acetaminophen (PERCOCET) 7.5-325 MG per tablet Take 1 tablet by mouth Every 6 (Six) Hours As Needed.     • tiZANidine (ZANAFLEX) 2 MG tablet      • ziprasidone (GEODON) 60 MG capsule 1 PO BID 60 capsule 0     No current facility-administered medications for this visit.        Mental Status Exam:   Hygiene:   good  Cooperation:  Cooperative  Eye Contact:  Good  Psychomotor Behavior:  Appropriate  Affect:  Full range  Hopelessness: Denies  Speech:  normal  Thought Process:  Goal directed  Thought Content:  Normal  Suicidal:  None  Homicidal:  None  Hallucinations:  Rare auditory and rare visual  Delusion:  None  Memory:  Intact  Orientation:  Person, Place, Time and " Situation  Reliability:  fair  Insight:  Fair  Judgement:  Fair  Impulse Control:  Fair  Physical/Medical Issues:  Yes, fibromyalgia  No looseness of associations  Fund of knowledge average  Language intact    Assessment/Plan   Problems Addressed this Visit     None      Visit Diagnoses     Severe bipolar affective disorder with psychosis (CMS/HCC)    -  Primary    Relevant Medications    FLUoxetine (PROzac) 40 MG capsule    ziprasidone (GEODON) 60 MG capsule    hydrOXYzine (ATARAX) 25 MG tablet    Generalized anxiety disorder        Relevant Medications    FLUoxetine (PROzac) 40 MG capsule    ziprasidone (GEODON) 60 MG capsule    hydrOXYzine (ATARAX) 25 MG tablet         Functionality: pt having minimal impairment in important areas of daily functioning.  Prognosis: Guarded dependent on medication/follow up and treatment plan compliance.  Rafy reviewed and is appropriate  UDS performed today pending  Discussed options with patient today.  We'll go ahead and try Atarax 3 times a day as needed for anxiety.  Discussed with her the possibility of sedation and drowsiness on this medication.  Patient is not due her Valiums until 6-28 so she will call and windows are daily and we will have her next scheduled appointment back in 5 weeks to try to get those back on track.  Patient is also aware of any thoughts to self-harm or suicidal ideation she is to tell her mother immediately and patient is to contact the [facility] or come to the emergency room as it is available 24 hours a day.  She verbalizes understanding of this.  Patient does state that she has an appointment with her primary care physician within the next week and will discuss her breast pain then.

## 2018-07-26 DIAGNOSIS — F41.1 GENERALIZED ANXIETY DISORDER: ICD-10-CM

## 2018-07-26 RX ORDER — DIAZEPAM 10 MG/1
10 TABLET ORAL 3 TIMES DAILY PRN
Qty: 90 TABLET | Refills: 0 | Status: SHIPPED | OUTPATIENT
Start: 2018-07-26 | End: 2018-08-27 | Stop reason: SDUPTHER

## 2018-08-27 DIAGNOSIS — F41.1 GENERALIZED ANXIETY DISORDER: ICD-10-CM

## 2018-08-27 NOTE — TELEPHONE ENCOUNTER
RX REQUEST.  MED DUE FOR REFILL.  NO PENDING APPT.  LAST SEENIN 07-21-18 AND  CANCELED APPT FOR 8-21-18

## 2018-08-28 DIAGNOSIS — F31.89 SEVERE BIPOLAR AFFECTIVE DISORDER WITH PSYCHOSIS (HCC): ICD-10-CM

## 2018-08-28 RX ORDER — FLUOXETINE HYDROCHLORIDE 40 MG/1
40 CAPSULE ORAL DAILY
Qty: 30 CAPSULE | Refills: 0 | Status: SHIPPED | OUTPATIENT
Start: 2018-08-28 | End: 2018-09-05 | Stop reason: SDUPTHER

## 2018-08-28 RX ORDER — DIAZEPAM 10 MG/1
10 TABLET ORAL 3 TIMES DAILY PRN
Qty: 30 TABLET | Refills: 0 | Status: SHIPPED | OUTPATIENT
Start: 2018-08-28 | End: 2018-09-05 | Stop reason: SDUPTHER

## 2018-09-05 ENCOUNTER — OFFICE VISIT (OUTPATIENT)
Dept: PSYCHIATRY | Facility: CLINIC | Age: 48
End: 2018-09-05

## 2018-09-05 VITALS
BODY MASS INDEX: 25.24 KG/M2 | SYSTOLIC BLOOD PRESSURE: 151 MMHG | DIASTOLIC BLOOD PRESSURE: 84 MMHG | WEIGHT: 133.6 LBS | HEART RATE: 71 BPM

## 2018-09-05 DIAGNOSIS — F31.89 SEVERE BIPOLAR AFFECTIVE DISORDER WITH PSYCHOSIS (HCC): Primary | ICD-10-CM

## 2018-09-05 DIAGNOSIS — F41.1 GENERALIZED ANXIETY DISORDER: ICD-10-CM

## 2018-09-05 DIAGNOSIS — Z79.899 MEDICATION MANAGEMENT: ICD-10-CM

## 2018-09-05 PROCEDURE — 99214 OFFICE O/P EST MOD 30 MIN: CPT | Performed by: NURSE PRACTITIONER

## 2018-09-05 RX ORDER — ZIPRASIDONE HYDROCHLORIDE 80 MG/1
CAPSULE ORAL
Qty: 60 CAPSULE | Refills: 0 | Status: SHIPPED | OUTPATIENT
Start: 2018-09-05 | End: 2018-10-04

## 2018-09-05 RX ORDER — FLUOXETINE HYDROCHLORIDE 40 MG/1
40 CAPSULE ORAL DAILY
Qty: 30 CAPSULE | Refills: 0 | Status: SHIPPED | OUTPATIENT
Start: 2018-09-05 | End: 2018-10-04 | Stop reason: SDUPTHER

## 2018-09-05 RX ORDER — DIAZEPAM 10 MG/1
10 TABLET ORAL 3 TIMES DAILY PRN
Qty: 90 TABLET | Refills: 0 | Status: SHIPPED | OUTPATIENT
Start: 2018-09-05 | End: 2018-10-04 | Stop reason: SDUPTHER

## 2018-09-05 NOTE — PROGRESS NOTES
"Subjective   Rani Martins is a 48 y.o. female who is here today for medication management follow up. presents with her mother with whom she gives permission to speak in front of.     Chief Complaint: \"I am doing OK but I am seeing more animals\"  HPI: Patient presents for her appointment at the [facility].  When she walked into the room she states there is a cat.  She states she saw a white cat on my couch which was not there.  She realized it was not there.  She states she is seeing more animals more frequently.  She is hearing voices and they typically call her name.  She states she is compliant with her medications.  She rates depression and anxiety both a 3 out of 10 with 10 being worst.  She denies suicidal thoughts or homicidal thoughts.  She denies any negative side effects to the medication.  She denies any new stressors that could be causing her symptoms to worsen.  She states she is sleeping good at 8 hours a night.Body mass index is 25.24 kg/m².  She states her appetite has increased and she does show a weight gain of 10 pounds since last office visit.  She is very concerned with this.  She denies increased irritability or mood swings.  She denies any anger outbursts.  She states overall she feels good except for seeing the dogs and cats and hearing the voices.  She denies any current medical stressors.         History of Present Illness   The following portions of the patient's history were reviewed and updated as appropriate: allergies, current medications, past family history, past medical history, past social history, past surgical history and problem list.    Review of Systems   Constitutional: Negative for activity change and appetite change.   HENT: Negative.    Eyes: Negative for visual disturbance.   Respiratory: Negative.  Negative for shortness of breath.    Cardiovascular: Negative.  Negative for chest pain.   Gastrointestinal: Negative for nausea.   Endocrine: Negative.  "   Genitourinary: Negative.    Musculoskeletal: Positive for back pain and myalgias. Negative for arthralgias and neck stiffness.   Skin: Negative.    Allergic/Immunologic: Negative.    Neurological: Negative for dizziness, tremors, seizures and headaches.   Hematological: Negative.    Psychiatric/Behavioral: Positive for hallucinations. Negative for agitation, behavioral problems, confusion, decreased concentration, dysphoric mood, self-injury, sleep disturbance and suicidal ideas. The patient is not hyperactive.    All other systems reviewed and are negative.      Objective   Physical Exam   Constitutional: She appears well-developed and well-nourished. No distress.   HENT:   Head: Normocephalic and atraumatic.   Neck: Normal range of motion.   Neurological: She is alert. Coordination and gait normal.   Psychiatric: She has a normal mood and affect. Her behavior is normal. Judgment and thought content normal. Her speech is rapid and/or pressured. Cognition and memory are normal.   Vitals reviewed.    Blood pressure 151/84, pulse 71, weight 60.6 kg (133 lb 9.6 oz).    Allergies   Allergen Reactions   • Lyrica [Pregabalin]    • Cymbalta [Duloxetine Hcl]    • Erythromycin    • Lithium    • Toradol [Ketorolac Tromethamine]    • Tramadol    • Penicillins Rash       Current Medications:   Current Outpatient Prescriptions   Medication Sig Dispense Refill   • diazePAM (VALIUM) 10 MG tablet Take 1 tablet by mouth 3 (Three) Times a Day As Needed for Anxiety. 90 tablet 0   • FLUoxetine (PROzac) 40 MG capsule Take 1 capsule by mouth Daily. 30 capsule 0   • hydrOXYzine (ATARAX) 25 MG tablet Take 1 tablet by mouth 3 (Three) Times a Day As Needed for Anxiety. 90 tablet 0   • oxyCODONE-acetaminophen (PERCOCET) 7.5-325 MG per tablet Take 1 tablet by mouth Every 6 (Six) Hours As Needed.     • tiZANidine (ZANAFLEX) 2 MG tablet      • ziprasidone (GEODON) 80 MG capsule 1 PO BID 60 capsule 0     No current facility-administered  medications for this visit.        Mental Status Exam:   Hygiene:   good  Cooperation:  Cooperative  Eye Contact:  Good  Psychomotor Behavior:  Appropriate  Affect:  Full range  Hopelessness: Denies  Speech:  normal  Thought Process:  Goal directed  Thought Content:  Normal  Suicidal:  None  Homicidal:  None  Hallucinations:  Rare auditory and rare visual  Delusion:  None  Memory:  Intact  Orientation:  Person, Place, Time and Situation  Reliability:  fair  Insight:  Fair  Judgement:  Fair  Impulse Control:  Fair  Physical/Medical Issues:  Yes, fibromyalgia  No looseness of associations  Fund of knowledge average  Language intact    Assessment/Plan   Problems Addressed this Visit     None      Visit Diagnoses     Severe bipolar affective disorder with psychosis (CMS/HCC)    -  Primary    Relevant Medications    diazePAM (VALIUM) 10 MG tablet    FLUoxetine (PROzac) 40 MG capsule    ziprasidone (GEODON) 80 MG capsule    Generalized anxiety disorder        Relevant Medications    diazePAM (VALIUM) 10 MG tablet    FLUoxetine (PROzac) 40 MG capsule    ziprasidone (GEODON) 80 MG capsule    Medication management             Functionality: pt having moderate impairment in important areas of daily functioning with ongoing hallucinations.   Prognosis: Guarded dependent on medication/follow up and treatment plan compliance.      Discussed options with patient today. Will increase pts geodon to 80mg bID.  Continue the prozac and Valium.  Will repeat UDS at next visit and draw labwork for metabolic syndrome.  She is aware to come to her appointment NPO.    Patient is also aware of any thoughts to self-harm or suicidal ideation she is to tell her mother immediately and patient is to contact the [facility] or come to the emergency room as it is available 24 hours a day.  She verbalizes understanding of this.

## 2018-10-04 ENCOUNTER — PRIOR AUTHORIZATION (OUTPATIENT)
Dept: PSYCHIATRY | Facility: CLINIC | Age: 48
End: 2018-10-04

## 2018-10-04 ENCOUNTER — OFFICE VISIT (OUTPATIENT)
Dept: PSYCHIATRY | Facility: CLINIC | Age: 48
End: 2018-10-04

## 2018-10-04 VITALS
WEIGHT: 127 LBS | DIASTOLIC BLOOD PRESSURE: 90 MMHG | BODY MASS INDEX: 23.98 KG/M2 | HEIGHT: 61 IN | HEART RATE: 78 BPM | SYSTOLIC BLOOD PRESSURE: 156 MMHG

## 2018-10-04 DIAGNOSIS — Z79.899 LONG-TERM USE OF HIGH-RISK MEDICATION: ICD-10-CM

## 2018-10-04 DIAGNOSIS — F41.1 GENERALIZED ANXIETY DISORDER: ICD-10-CM

## 2018-10-04 DIAGNOSIS — F31.89 SEVERE BIPOLAR AFFECTIVE DISORDER WITH PSYCHOSIS (HCC): Primary | ICD-10-CM

## 2018-10-04 PROCEDURE — 99214 OFFICE O/P EST MOD 30 MIN: CPT | Performed by: NURSE PRACTITIONER

## 2018-10-04 RX ORDER — DIAZEPAM 10 MG/1
10 TABLET ORAL 3 TIMES DAILY PRN
Qty: 90 TABLET | Refills: 0 | Status: SHIPPED | OUTPATIENT
Start: 2018-10-04 | End: 2018-10-10

## 2018-10-04 RX ORDER — FLUOXETINE HYDROCHLORIDE 40 MG/1
40 CAPSULE ORAL DAILY
Qty: 30 CAPSULE | Refills: 0 | Status: ON HOLD | OUTPATIENT
Start: 2018-10-04 | End: 2020-06-27

## 2018-10-04 RX ORDER — LURASIDONE HYDROCHLORIDE 40 MG/1
40 TABLET, FILM COATED ORAL DAILY
Qty: 30 TABLET | Refills: 0 | Status: ON HOLD | OUTPATIENT
Start: 2018-10-04 | End: 2020-06-27

## 2018-10-04 RX ORDER — HYDROXYZINE HYDROCHLORIDE 25 MG/1
25 TABLET, FILM COATED ORAL 3 TIMES DAILY PRN
Qty: 90 TABLET | Refills: 0 | Status: ON HOLD | OUTPATIENT
Start: 2018-10-04 | End: 2020-06-27

## 2018-10-04 NOTE — PROGRESS NOTES
Subjective   Rani Martins is a 48 y.o. female who is here today for medication management follow up. presents with her mother with whom she gives permission to speak in front of.     Chief Complaint: Recheck on bipolar disorder  HPI: Upon entering the room patient said hello to my empty chair and sat down with her mother and her mother looked very startled at this and patient started hallucinating that there was a woman sitting there and started crying hysterically signing the woman did not have any skin on her face and this was scaring her.  She also thinks she is seeing her in the corner of the room.  Her mother states that she was doing this all night and that she did not sleep last night.  Patient states that she has not had her Geodon or her Prozac or her Valium for a week as she was out of town at a friend's house and left it there.  Mother became distraught saying she wished she had known.  She denies that the voices she is hearing her commanding telling her to hurt herself.  She denies suicidal thoughts or homicidal thoughts.  Anxiety is very high and she sat during the office with her head down on her mom's shoulder covering her eyes so she states she would not see the images.  She has not had any medical illness, no fevers no new stressors other than leaving her medicine at the friend's house.  He states she also wanted to stop the Geodon anyway because it was causing her significant nausea and she did not like it. There are no complaints of tremors.        History of Present Illness   The following portions of the patient's history were reviewed and updated as appropriate: allergies, current medications, past family history, past medical history, past social history, past surgical history and problem list.    Review of Systems   Constitutional: Negative for activity change and appetite change.   HENT: Negative.    Eyes: Negative for visual disturbance.   Respiratory: Negative.  Negative for shortness of  "breath.    Cardiovascular: Negative.  Negative for chest pain.   Gastrointestinal: Negative for nausea.   Endocrine: Negative.    Genitourinary: Negative.    Musculoskeletal: Positive for back pain and myalgias. Negative for arthralgias and neck stiffness.   Skin: Negative.    Allergic/Immunologic: Negative.    Neurological: Negative for dizziness, tremors, seizures and headaches.   Hematological: Negative.    Psychiatric/Behavioral: Positive for hallucinations and sleep disturbance. Negative for agitation, behavioral problems, confusion, decreased concentration, dysphoric mood, self-injury and suicidal ideas. The patient is not hyperactive.    All other systems reviewed and are negative.      Objective   Physical Exam   Constitutional: She appears well-developed and well-nourished. No distress.   HENT:   Head: Normocephalic and atraumatic.   Neck: Normal range of motion.   Neurological: She is alert. Coordination and gait normal.   Psychiatric: Her mood appears anxious. Her speech is not rapid and/or pressured.   Crying and looking around room saying she is seeing a woman without skin on her face.    Vitals reviewed.    Blood pressure 156/90, pulse 78, height 154.9 cm (61\"), weight 57.6 kg (127 lb).    Allergies   Allergen Reactions   • Lyrica [Pregabalin]    • Cymbalta [Duloxetine Hcl]    • Erythromycin    • Lithium    • Toradol [Ketorolac Tromethamine]    • Tramadol    • Penicillins Rash       Current Medications:   Current Outpatient Prescriptions   Medication Sig Dispense Refill   • diazePAM (VALIUM) 10 MG tablet Take 1 tablet by mouth 3 (Three) Times a Day As Needed for Anxiety. 90 tablet 0   • FLUoxetine (PROzac) 40 MG capsule Take 1 capsule by mouth Daily. 30 capsule 0   • hydrOXYzine (ATARAX) 25 MG tablet Take 1 tablet by mouth 3 (Three) Times a Day As Needed for Anxiety. 90 tablet 0   • lurasidone (LATUDA) 40 MG tablet tablet Take 1 tablet by mouth Daily. 30 tablet 0   • oxyCODONE-acetaminophen (PERCOCET) " 7.5-325 MG per tablet Take 1 tablet by mouth Every 6 (Six) Hours As Needed.     • tiZANidine (ZANAFLEX) 2 MG tablet        No current facility-administered medications for this visit.        Mental Status Exam:   Hygiene:   good  Cooperation:  Cooperative  Eye Contact:  Good  Psychomotor Behavior:  Appropriate  Affect:  Full range  Hopelessness: Denies  Speech:  normal  Thought Process:  Goal directed  Thought Content:  Normal  Suicidal:  None  Homicidal:  None  Hallucinations:  Rare auditory and rare visual  Delusion:  None  Memory:  Intact  Orientation:  Person, Place, Time and Situation  Reliability:  fair  Insight:  Fair  Judgement:  Fair  Impulse Control:  Fair  Physical/Medical Issues:  Yes, fibromyalgia  No looseness of associations  Fund of knowledge average  Language intact    Assessment/Plan   Problems Addressed this Visit     None      Visit Diagnoses     Severe bipolar affective disorder with psychosis (CMS/HCC)    -  Primary    Relevant Medications    lurasidone (LATUDA) 40 MG tablet tablet    FLUoxetine (PROzac) 40 MG capsule    diazePAM (VALIUM) 10 MG tablet    hydrOXYzine (ATARAX) 25 MG tablet    Generalized anxiety disorder        Relevant Medications    lurasidone (LATUDA) 40 MG tablet tablet    FLUoxetine (PROzac) 40 MG capsule    diazePAM (VALIUM) 10 MG tablet    hydrOXYzine (ATARAX) 25 MG tablet    Long-term use of high-risk medication             Functionality: pt having moderate impairment in important areas of daily functioning with ongoing hallucinations.   Prognosis: Guarded dependent on medication/follow up and treatment plan compliance.  UDS performed today    Discussed options with patient today.  Going to begin patient on Latuda.  Discontinue the geodon due to side effects. check labs next for metabolic syndrome.  With pt being so upset today will avoid the labwork until next visit.   .  She is aware to come to her appointment NPO.  Latuda samples 40mg given while we wait on the  shayla. I wanted her to go inpatient but she refused.  Said it scares her too bad.  Her mother says she will take her home.  She verbalized if she gets any worse she will take her straight to the ER.  Wanted to have patient follow up on Monday and the mother states that is not a good day for her to bring her that it needs to be on Wednesday.  So an appointment was given for next Wednesday.

## 2018-10-10 ENCOUNTER — OFFICE VISIT (OUTPATIENT)
Dept: PSYCHIATRY | Facility: CLINIC | Age: 48
End: 2018-10-10

## 2018-10-10 VITALS
HEIGHT: 61 IN | HEART RATE: 51 BPM | WEIGHT: 128.6 LBS | BODY MASS INDEX: 24.28 KG/M2 | DIASTOLIC BLOOD PRESSURE: 83 MMHG | SYSTOLIC BLOOD PRESSURE: 143 MMHG

## 2018-10-10 DIAGNOSIS — F31.89 SEVERE BIPOLAR AFFECTIVE DISORDER WITH PSYCHOSIS (HCC): ICD-10-CM

## 2018-10-10 DIAGNOSIS — Z79.899 LONG-TERM USE OF HIGH-RISK MEDICATION: ICD-10-CM

## 2018-10-10 DIAGNOSIS — F41.1 GENERALIZED ANXIETY DISORDER: ICD-10-CM

## 2018-10-10 DIAGNOSIS — F13.90 BENZODIAZEPINE MISUSE: Primary | ICD-10-CM

## 2018-10-10 PROCEDURE — 99213 OFFICE O/P EST LOW 20 MIN: CPT | Performed by: NURSE PRACTITIONER

## 2018-10-10 RX ORDER — HYDROXYZINE HYDROCHLORIDE 25 MG/1
25 TABLET, FILM COATED ORAL 3 TIMES DAILY PRN
Qty: 90 TABLET | Refills: 0 | Status: ON HOLD | OUTPATIENT
Start: 2018-10-10 | End: 2020-06-27

## 2018-10-10 NOTE — PROGRESS NOTES
Subjective   Rani Martins is a 48 y.o. female who is here today for medication management follow up. presents with her mother with whom she gives permission to speak in front of.     Chief Complaint: Recheck on bipolar disorder  HPI:Pt presents or follow-up appointment on her hallucinations.  Patient again by walking in the room and stating that she had to tell me something.  She proceeded to say that she had flushed her entire prescription of Valium down the toilet because it was making her nauseous.  That her hallucinations are still there with voices and seeing animals at times however the scary hallucinations have resolved.  Ativan told the patient that we needed to discuss the issue with benzodiazepines.  I told her that her urine drug screen showed positive for several other benzodiazepines including Ativan, Xanax, Klonopin.  She states that she had told me that she had left her medicine in [place] and she had several other bottles of all different types of benzodiazepines around that had been prescribed to her by Dr. Stewart and she had taken those until she could get in to her office visit.  Also told her that her urine drug screen showed content remained and she states she has not taken any types of diet pills.  History of Present Illness   The following portions of the patient's history were reviewed and updated as appropriate: allergies, current medications, past family history, past medical history, past social history, past surgical history and problem list.    Review of Systems   Constitutional: Negative for activity change and appetite change.   HENT: Negative.    Eyes: Negative for visual disturbance.   Respiratory: Negative.  Negative for shortness of breath.    Cardiovascular: Negative.  Negative for chest pain.   Gastrointestinal: Negative for nausea.   Endocrine: Negative.    Genitourinary: Negative.    Musculoskeletal: Positive for back pain and myalgias. Negative for arthralgias and neck  "stiffness.   Skin: Negative.    Allergic/Immunologic: Negative.    Neurological: Negative for dizziness, tremors, seizures and headaches.   Hematological: Negative.    Psychiatric/Behavioral: Positive for hallucinations and sleep disturbance. Negative for agitation, behavioral problems, confusion, decreased concentration, dysphoric mood, self-injury and suicidal ideas. The patient is not hyperactive.    All other systems reviewed and are negative.      Objective   Physical Exam   Constitutional: She appears well-developed and well-nourished. No distress.   HENT:   Head: Normocephalic and atraumatic.   Neck: Normal range of motion.   Neurological: She is alert. Coordination and gait normal.   Psychiatric: Her mood appears anxious. Her speech is not rapid and/or pressured.   Crying and looking around room saying she is seeing a woman without skin on her face.    Vitals reviewed.    Blood pressure 143/83, pulse 51, height 154.9 cm (61\"), weight 58.3 kg (128 lb 9.6 oz).    Allergies   Allergen Reactions   • Lyrica [Pregabalin]    • Cymbalta [Duloxetine Hcl]    • Erythromycin    • Lithium    • Toradol [Ketorolac Tromethamine]    • Tramadol    • Penicillins Rash       Current Medications:   Current Outpatient Prescriptions   Medication Sig Dispense Refill   • FLUoxetine (PROzac) 40 MG capsule Take 1 capsule by mouth Daily. 30 capsule 0   • hydrOXYzine (ATARAX) 25 MG tablet Take 1 tablet by mouth 3 (Three) Times a Day As Needed for Anxiety. 90 tablet 0   • hydrOXYzine (ATARAX) 25 MG tablet Take 1 tablet by mouth 3 (Three) Times a Day As Needed for Anxiety (anxiety). 90 tablet 0   • lurasidone (LATUDA) 40 MG tablet tablet Take 1 tablet by mouth Daily. 30 tablet 0   • oxyCODONE-acetaminophen (PERCOCET) 7.5-325 MG per tablet Take 1 tablet by mouth Every 6 (Six) Hours As Needed.     • tiZANidine (ZANAFLEX) 2 MG tablet        No current facility-administered medications for this visit.        Mental Status Exam:   Hygiene:   " good  Cooperation:  Cooperative  Eye Contact:  Good  Psychomotor Behavior:  Appropriate  Affect:  Full range  Hopelessness: Denies  Speech:  normal  Thought Process:  Goal directed  Thought Content:  Normal  Suicidal:  None  Homicidal:  None  Hallucinations:  Rare auditory and rare visual  Delusion:  None  Memory:  Intact  Orientation:  Person, Place, Time and Situation  Reliability:  fair  Insight:  Fair  Judgement:  Fair  Impulse Control:  Fair  Physical/Medical Issues:  Yes, fibromyalgia  No looseness of associations  Fund of knowledge average  Language intact    Assessment/Plan   Problems Addressed this Visit     None      Visit Diagnoses     Benzodiazepine misuse (CMS/HCC)    -  Primary    Severe bipolar affective disorder with psychosis (CMS/HCC)        Relevant Medications    hydrOXYzine (ATARAX) 25 MG tablet    Generalized anxiety disorder        Relevant Medications    hydrOXYzine (ATARAX) 25 MG tablet    Long-term use of high-risk medication             Functionality: pt having moderate impairment in important areas of daily functioning with ongoing hallucinations.   Prognosis: Guarded dependent on medication/follow up and treatment plan compliance.  I explained to the patient that she has not taken the medication as prescribed and that taking random benzodiazepines places her at risk for accidental overdose especially since she takes the oxycodone.  I offered her inpatient detox to come off of the benzodiazepines.  She states she will absolutely not do that.  I explained to her that abrupt cessation of benzodiazepines can have serious health consequences such as seizures even death.  She states she has to have benzodiazepines.  I then explained to her that I cannot prescribe them for her anymore as I believe she is at risk for accidental overdose and once again offered her inpatient detox off of the medication.  She states again she will not do that.  Her mother was trying to get her to go in and go  inpatient and she refused.  Her mother then asked what could she take for anxiety and I mentioned Inderal and she states she has taken that before and it did not help.  I then suggested Atarax and told her I would send a prescription for this in.  I did proceed to tell them however that Atarax would not help with withdrawal from benzodiazepines as far as safety was concerned.  She then proceeded to stand up and state she will not be coming back to this office for treatment.  She then walked out of the office and her mother kind of lagged behind and I told the mother to watch her closely and if she saw any symptoms of any problems she needed to take her to the emergency room and that the intake was available 24 hours should the patient change her mind and go in for detox.

## 2018-11-02 DIAGNOSIS — F41.1 GENERALIZED ANXIETY DISORDER: ICD-10-CM

## 2018-11-02 RX ORDER — DIAZEPAM 10 MG/1
TABLET ORAL
Qty: 90 TABLET | Refills: 0 | OUTPATIENT
Start: 2018-11-02

## 2018-12-05 ENCOUNTER — TRANSCRIBE ORDERS (OUTPATIENT)
Dept: ADMINISTRATIVE | Facility: HOSPITAL | Age: 48
End: 2018-12-05

## 2018-12-05 DIAGNOSIS — M54.40 LOW BACK PAIN WITH SCIATICA, SCIATICA LATERALITY UNSPECIFIED, UNSPECIFIED BACK PAIN LATERALITY, UNSPECIFIED CHRONICITY: Primary | ICD-10-CM

## 2018-12-13 ENCOUNTER — APPOINTMENT (OUTPATIENT)
Dept: MRI IMAGING | Facility: HOSPITAL | Age: 48
End: 2018-12-13
Attending: FAMILY MEDICINE

## 2018-12-14 ENCOUNTER — HOSPITAL ENCOUNTER (OUTPATIENT)
Dept: MRI IMAGING | Facility: HOSPITAL | Age: 48
Discharge: HOME | End: 2018-12-14
Attending: FAMILY MEDICINE | Admitting: FAMILY MEDICINE

## 2018-12-14 DIAGNOSIS — M54.40 LOW BACK PAIN WITH SCIATICA, SCIATICA LATERALITY UNSPECIFIED, UNSPECIFIED BACK PAIN LATERALITY, UNSPECIFIED CHRONICITY: ICD-10-CM

## 2018-12-14 PROCEDURE — 72148 MRI LUMBAR SPINE W/O DYE: CPT | Performed by: RADIOLOGY

## 2018-12-14 PROCEDURE — 72148 MRI LUMBAR SPINE W/O DYE: CPT

## 2019-01-10 ENCOUNTER — HOSPITAL ENCOUNTER (EMERGENCY)
Facility: HOSPITAL | Age: 49
Discharge: HOME | End: 2019-01-10
Attending: EMERGENCY MEDICINE | Admitting: EMERGENCY MEDICINE

## 2019-01-10 ENCOUNTER — APPOINTMENT (OUTPATIENT)
Dept: CT IMAGING | Facility: HOSPITAL | Age: 49
End: 2019-01-10

## 2019-01-10 VITALS
HEART RATE: 88 BPM | OXYGEN SATURATION: 100 % | DIASTOLIC BLOOD PRESSURE: 98 MMHG | RESPIRATION RATE: 18 BRPM | HEIGHT: 61 IN | SYSTOLIC BLOOD PRESSURE: 156 MMHG | TEMPERATURE: 98 F | BODY MASS INDEX: 22.66 KG/M2 | WEIGHT: 120 LBS

## 2019-01-10 DIAGNOSIS — I10 ESSENTIAL HYPERTENSION: Primary | ICD-10-CM

## 2019-01-10 LAB
ALBUMIN SERPL-MCNC: 5 G/DL (ref 3.5–5)
ALBUMIN/GLOB SERPL: 1.5 G/DL (ref 1.5–2.5)
ALP SERPL-CCNC: 125 U/L (ref 35–104)
ALT SERPL W P-5'-P-CCNC: 35 U/L (ref 10–36)
ANION GAP SERPL CALCULATED.3IONS-SCNC: 11.3 MMOL/L (ref 3.6–11.2)
APAP SERPL-MCNC: <10 MCG/ML (ref 0–200)
AST SERPL-CCNC: 25 U/L (ref 10–30)
BASOPHILS # BLD AUTO: 0.04 10*3/MM3 (ref 0–0.3)
BASOPHILS NFR BLD AUTO: 0.5 % (ref 0–2)
BILIRUB SERPL-MCNC: 0.5 MG/DL (ref 0.2–1.8)
BUN BLD-MCNC: 10 MG/DL (ref 7–21)
BUN/CREAT SERPL: 12.8 (ref 7–25)
CALCIUM SPEC-SCNC: 10.1 MG/DL (ref 7.7–10)
CHLORIDE SERPL-SCNC: 104 MMOL/L (ref 99–112)
CO2 SERPL-SCNC: 23.7 MMOL/L (ref 24.3–31.9)
CREAT BLD-MCNC: 0.78 MG/DL (ref 0.43–1.29)
D-LACTATE SERPL-SCNC: 2 MMOL/L (ref 0.5–2)
DEPRECATED RDW RBC AUTO: 44.5 FL (ref 37–54)
EOSINOPHIL # BLD AUTO: 0.06 10*3/MM3 (ref 0–0.7)
EOSINOPHIL NFR BLD AUTO: 0.7 % (ref 0–5)
ERYTHROCYTE [DISTWIDTH] IN BLOOD BY AUTOMATED COUNT: 13.6 % (ref 11.5–14.5)
ETHANOL BLD-MCNC: <10 MG/DL
ETHANOL UR QL: <0.01 %
GFR SERPL CREATININE-BSD FRML MDRD: 80 ML/MIN/1.73
GLOBULIN UR ELPH-MCNC: 3.3 GM/DL
GLUCOSE BLD-MCNC: 103 MG/DL (ref 70–110)
HCT VFR BLD AUTO: 46.1 % (ref 37–47)
HGB BLD-MCNC: 15.4 G/DL (ref 12–16)
IMM GRANULOCYTES # BLD AUTO: 0.02 10*3/MM3 (ref 0–0.03)
IMM GRANULOCYTES NFR BLD AUTO: 0.2 % (ref 0–0.5)
LYMPHOCYTES # BLD AUTO: 1.25 10*3/MM3 (ref 1–3)
LYMPHOCYTES NFR BLD AUTO: 14.4 % (ref 21–51)
MAGNESIUM SERPL-MCNC: 2.1 MG/DL (ref 1.7–2.6)
MCH RBC QN AUTO: 29.9 PG (ref 27–33)
MCHC RBC AUTO-ENTMCNC: 33.4 G/DL (ref 33–37)
MCV RBC AUTO: 89.5 FL (ref 80–94)
MONOCYTES # BLD AUTO: 0.47 10*3/MM3 (ref 0.1–0.9)
MONOCYTES NFR BLD AUTO: 5.4 % (ref 0–10)
NEUTROPHILS # BLD AUTO: 6.85 10*3/MM3 (ref 1.4–6.5)
NEUTROPHILS NFR BLD AUTO: 78.8 % (ref 30–70)
OSMOLALITY SERPL CALC.SUM OF ELEC: 276.8 MOSM/KG (ref 273–305)
PLATELET # BLD AUTO: 344 10*3/MM3 (ref 130–400)
PMV BLD AUTO: 10.4 FL (ref 6–10)
POTASSIUM BLD-SCNC: 3.7 MMOL/L (ref 3.5–5.3)
PROT SERPL-MCNC: 8.3 G/DL (ref 6–8)
RBC # BLD AUTO: 5.15 10*6/MM3 (ref 4.2–5.4)
SALICYLATES SERPL-MCNC: <1 MG/DL (ref 0–30)
SODIUM BLD-SCNC: 139 MMOL/L (ref 135–153)
WBC NRBC COR # BLD: 8.69 10*3/MM3 (ref 4.5–12.5)

## 2019-01-10 PROCEDURE — 70450 CT HEAD/BRAIN W/O DYE: CPT

## 2019-01-10 PROCEDURE — 70450 CT HEAD/BRAIN W/O DYE: CPT | Performed by: RADIOLOGY

## 2019-01-10 PROCEDURE — 99284 EMERGENCY DEPT VISIT MOD MDM: CPT

## 2019-01-10 PROCEDURE — 83735 ASSAY OF MAGNESIUM: CPT | Performed by: EMERGENCY MEDICINE

## 2019-01-10 PROCEDURE — 80053 COMPREHEN METABOLIC PANEL: CPT | Performed by: EMERGENCY MEDICINE

## 2019-01-10 PROCEDURE — 96360 HYDRATION IV INFUSION INIT: CPT

## 2019-01-10 PROCEDURE — 80307 DRUG TEST PRSMV CHEM ANLYZR: CPT | Performed by: EMERGENCY MEDICINE

## 2019-01-10 PROCEDURE — 83605 ASSAY OF LACTIC ACID: CPT | Performed by: EMERGENCY MEDICINE

## 2019-01-10 PROCEDURE — 85025 COMPLETE CBC W/AUTO DIFF WBC: CPT | Performed by: EMERGENCY MEDICINE

## 2019-01-10 RX ORDER — CLONIDINE HYDROCHLORIDE 0.1 MG/1
0.1 TABLET ORAL ONCE
Status: COMPLETED | OUTPATIENT
Start: 2019-01-10 | End: 2019-01-10

## 2019-01-10 RX ORDER — ACETAMINOPHEN 325 MG/1
975 TABLET ORAL ONCE
Status: COMPLETED | OUTPATIENT
Start: 2019-01-10 | End: 2019-01-10

## 2019-01-10 RX ADMIN — ACETAMINOPHEN 975 MG: 325 TABLET, FILM COATED ORAL at 16:45

## 2019-01-10 RX ADMIN — CLONIDINE HYDROCHLORIDE 0.1 MG: 0.1 TABLET ORAL at 17:16

## 2019-01-10 RX ADMIN — CLONIDINE HYDROCHLORIDE 0.1 MG: 0.1 TABLET ORAL at 15:26

## 2019-01-10 RX ADMIN — SODIUM CHLORIDE 1000 ML: 9 INJECTION, SOLUTION INTRAVENOUS at 14:29

## 2019-01-10 NOTE — ED NOTES
Encouraged pt to give urine sample, she said she didn't have to go right now but she would try later.      Sheela Escobar  01/10/19 7877

## 2019-01-10 NOTE — ED NOTES
Patient mother reports patient took mucinex yesterday around 1230 and states patient has not been acting right and has been talking out of her head since. Patient mother reports patient is confused, and doesn't know who she is or where she is and thinks her older daughter is still a baby. Mother reports patients grandmother has had serious medication reactions in the past, and the patient has been known to have hallucinations when taken certain medications.   Patient resting quietly on stretcher with no complaints. Pt AA, No respiratory distress noted. Respirations even and unlabored. Pt denies any needs at this time. Skin PWD. Pt family at bedside. Will continue to monitor and follow plan of care. Bed rails up x 2, bed in lowest position, call light within reach.      Charmaine Jc RN  01/10/19 4410

## 2019-01-10 NOTE — ED PROVIDER NOTES
Subjective   Patient presents to ER with altered mental status. She has been taking OTC medication with Dextromethorphan.        Altered Mental Status   Presenting symptoms: behavior changes, disorientation and lethargy    Severity:  Moderate  Most recent episode:  Yesterday  Episode history:  Multiple  Timing:  Intermittent  Chronicity:  Recurrent  Context: drug use and not taking medications as prescribed    Associated symptoms: light-headedness        Review of Systems   Constitutional: Positive for activity change and fatigue.   HENT: Negative.    Eyes: Negative.    Respiratory: Negative.    Cardiovascular: Negative.    Gastrointestinal: Negative.    Endocrine: Negative.    Genitourinary: Negative.    Musculoskeletal: Negative.    Allergic/Immunologic: Negative.    Neurological: Positive for speech difficulty and light-headedness.   Hematological: Negative.    Psychiatric/Behavioral: Positive for decreased concentration. The patient is nervous/anxious.        Past Medical History:   Diagnosis Date   • Anxiety    • Bipolar disorder (CMS/HCC)    • Depression    • Fibromyalgia        Allergies   Allergen Reactions   • Lyrica [Pregabalin]    • Cymbalta [Duloxetine Hcl]    • Erythromycin    • Lithium    • Toradol [Ketorolac Tromethamine]    • Tramadol    • Penicillins Rash       Past Surgical History:   Procedure Laterality Date   • BREAST BIOPSY Left 19 yrs ago    benign   • HYSTERECTOMY      30       Family History   Problem Relation Age of Onset   • Throat cancer Father    • Heart disease Mother    • Anxiety disorder Mother    • Depression Mother    • Breast cancer Neg Hx        Social History     Socioeconomic History   • Marital status:      Spouse name: Not on file   • Number of children: Not on file   • Years of education: Not on file   • Highest education level: Not on file   Tobacco Use   • Smoking status: Never Smoker   • Smokeless tobacco: Never Used   Substance and Sexual Activity   • Alcohol use:  No   • Drug use: No   • Sexual activity: Defer           Objective   Physical Exam   Constitutional: She appears well-developed and well-nourished. She appears lethargic.   HENT:   Head: Normocephalic.   Mouth/Throat: Oropharynx is clear and moist.   Eyes:   Pupils dilated , reactive   Neck: Normal range of motion. Neck supple.   Cardiovascular: Normal rate and regular rhythm.   Pulmonary/Chest: Effort normal.   Abdominal: Soft.   Musculoskeletal: Normal range of motion.   Neurological: She has normal strength. She appears lethargic. She displays a negative Romberg sign. GCS eye subscore is 4. GCS verbal subscore is 5. GCS motor subscore is 6.   Skin: Skin is warm.   Psychiatric:   Flattened affect   Nursing note and vitals reviewed.      Procedures           ED Course  ED Course as of Rashaad 10 1632   Thu Rashaad 10, 2019   1534 CT head negative  [YESSY]      ED Course User Index  [YESSY] Shad Crawford MD                  [facility]      Final diagnoses:   None            Shad Crawford MD  01/10/19 1602       Shad Crawford MD  01/10/19 1632       Shad Crawford MD  01/10/19 1736

## 2020-02-05 ENCOUNTER — HOSPITAL ENCOUNTER (OUTPATIENT)
Dept: ULTRASOUND IMAGING | Facility: HOSPITAL | Age: 50
Discharge: HOME | End: 2020-02-05

## 2020-02-05 ENCOUNTER — HOSPITAL ENCOUNTER (OUTPATIENT)
Dept: MAMMOGRAPHY | Facility: HOSPITAL | Age: 50
Discharge: HOME | End: 2020-02-05
Admitting: PHYSICIAN ASSISTANT

## 2020-02-05 DIAGNOSIS — N63.20 LEFT BREAST LUMP: ICD-10-CM

## 2020-02-05 PROCEDURE — 77066 DX MAMMO INCL CAD BI: CPT

## 2020-02-05 PROCEDURE — G0279 TOMOSYNTHESIS, MAMMO: HCPCS

## 2020-02-05 PROCEDURE — 77066 DX MAMMO INCL CAD BI: CPT | Performed by: RADIOLOGY

## 2020-02-05 PROCEDURE — 77062 BREAST TOMOSYNTHESIS BI: CPT | Performed by: RADIOLOGY

## 2020-02-05 PROCEDURE — 76642 ULTRASOUND BREAST LIMITED: CPT

## 2020-06-27 ENCOUNTER — APPOINTMENT (OUTPATIENT)
Dept: CT IMAGING | Facility: HOSPITAL | Age: 50
End: 2020-06-27

## 2020-06-27 ENCOUNTER — APPOINTMENT (OUTPATIENT)
Dept: CARDIOLOGY | Facility: HOSPITAL | Age: 50
End: 2020-06-27

## 2020-06-27 ENCOUNTER — HOSPITAL ENCOUNTER (INPATIENT)
Facility: HOSPITAL | Age: 50
LOS: 6 days | Discharge: HOME | End: 2020-07-03
Attending: EMERGENCY MEDICINE | Admitting: INTERNAL MEDICINE

## 2020-06-27 ENCOUNTER — APPOINTMENT (OUTPATIENT)
Dept: GENERAL RADIOLOGY | Facility: HOSPITAL | Age: 50
End: 2020-06-27

## 2020-06-27 DIAGNOSIS — I95.1 ORTHOSTATIC HYPOTENSION: ICD-10-CM

## 2020-06-27 DIAGNOSIS — A41.9 SEPSIS, DUE TO UNSPECIFIED ORGANISM, UNSPECIFIED WHETHER ACUTE ORGAN DYSFUNCTION PRESENT (HCC): Primary | ICD-10-CM

## 2020-06-27 DIAGNOSIS — R77.8 ELEVATED TROPONIN: ICD-10-CM

## 2020-06-27 DIAGNOSIS — S00.83XA CONTUSION OF FACE, INITIAL ENCOUNTER: ICD-10-CM

## 2020-06-27 DIAGNOSIS — R19.7 DIARRHEA, UNSPECIFIED TYPE: ICD-10-CM

## 2020-06-27 DIAGNOSIS — I21.4 NSTEMI, INITIAL EPISODE OF CARE (HCC): ICD-10-CM

## 2020-06-27 DIAGNOSIS — F19.10 POLYSUBSTANCE ABUSE (HCC): ICD-10-CM

## 2020-06-27 DIAGNOSIS — K52.9 COLITIS: ICD-10-CM

## 2020-06-27 DIAGNOSIS — S02.2XXA CLOSED FRACTURE OF NASAL BONE, INITIAL ENCOUNTER: ICD-10-CM

## 2020-06-27 LAB
6-ACETYL MORPHINE: NEGATIVE
A-A DO2: 25.5 MMHG (ref 0–300)
ALBUMIN SERPL-MCNC: 3.54 G/DL (ref 3.5–5.2)
ALBUMIN/GLOB SERPL: 1.1 G/DL
ALP SERPL-CCNC: 112 U/L (ref 39–117)
ALT SERPL W P-5'-P-CCNC: 32 U/L (ref 1–33)
AMPHET+METHAMPHET UR QL: NEGATIVE
ANION GAP SERPL CALCULATED.3IONS-SCNC: 10.6 MMOL/L (ref 5–15)
ARTERIAL PATENCY WRIST A: ABNORMAL
AST SERPL-CCNC: 35 U/L (ref 1–32)
ATMOSPHERIC PRESS: 729 MMHG
BACTERIA UR QL AUTO: ABNORMAL /HPF
BARBITURATES UR QL SCN: NEGATIVE
BASE EXCESS BLDA CALC-SCNC: -0.3 MMOL/L (ref 0–2)
BASOPHILS # BLD AUTO: 0.06 10*3/MM3 (ref 0–0.2)
BASOPHILS NFR BLD AUTO: 0.4 % (ref 0–1.5)
BDY SITE: ABNORMAL
BENZODIAZ UR QL SCN: POSITIVE
BILIRUB SERPL-MCNC: 0.5 MG/DL (ref 0.2–1.2)
BILIRUB UR QL STRIP: NEGATIVE
BODY TEMPERATURE: 0 C
BUN BLD-MCNC: 5 MG/DL (ref 6–20)
BUN/CREAT SERPL: 6.3 (ref 7–25)
BUPRENORPHINE SERPL-MCNC: NEGATIVE NG/ML
CALCIUM SPEC-SCNC: 9.3 MG/DL (ref 8.6–10.5)
CANNABINOIDS SERPL QL: NEGATIVE
CHLORIDE SERPL-SCNC: 100 MMOL/L (ref 98–107)
CK SERPL-CCNC: 79 U/L (ref 20–180)
CLARITY UR: CLEAR
CO2 BLDA-SCNC: 25 MMOL/L (ref 22–33)
CO2 SERPL-SCNC: 22.4 MMOL/L (ref 22–29)
COCAINE UR QL: NEGATIVE
COHGB MFR BLD: 0.7 % (ref 0–5)
COLOR UR: YELLOW
CORTIS SERPL-MCNC: 11.74 MCG/DL
CREAT BLD-MCNC: 0.8 MG/DL (ref 0.57–1)
CRP SERPL-MCNC: 1.31 MG/DL (ref 0–0.5)
D-LACTATE SERPL-SCNC: 1.5 MMOL/L (ref 0.5–2)
DEPRECATED RDW RBC AUTO: 43.1 FL (ref 37–54)
EOSINOPHIL # BLD AUTO: 1.43 10*3/MM3 (ref 0–0.4)
EOSINOPHIL NFR BLD AUTO: 9 % (ref 0.3–6.2)
ERYTHROCYTE [DISTWIDTH] IN BLOOD BY AUTOMATED COUNT: 12.9 % (ref 12.3–15.4)
ETHANOL BLD-MCNC: <10 MG/DL (ref 0–10)
ETHANOL UR QL: <0.01 %
GFR SERPL CREATININE-BSD FRML MDRD: 77 ML/MIN/1.73
GLOBULIN UR ELPH-MCNC: 3.2 GM/DL
GLUCOSE BLD-MCNC: 115 MG/DL (ref 65–99)
GLUCOSE UR STRIP-MCNC: NEGATIVE MG/DL
HAV IGM SERPL QL IA: NORMAL
HBV CORE IGM SERPL QL IA: NORMAL
HBV SURFACE AG SERPL QL IA: NORMAL
HCO3 BLDA-SCNC: 23.9 MMOL/L (ref 20–26)
HCT VFR BLD AUTO: 40 % (ref 34–46.6)
HCT VFR BLD CALC: 40.2 % (ref 38–51)
HCV AB SER DONR QL: NORMAL
HGB BLD-MCNC: 13 G/DL (ref 12–15.9)
HGB BLDA-MCNC: 13.1 G/DL (ref 13.5–17.5)
HGB UR QL STRIP.AUTO: ABNORMAL
HOLD SPECIMEN: NORMAL
HOLD SPECIMEN: NORMAL
HOROWITZ INDEX BLD+IHG-RTO: 21 %
HYALINE CASTS UR QL AUTO: ABNORMAL /LPF
IMM GRANULOCYTES # BLD AUTO: 0.06 10*3/MM3 (ref 0–0.05)
IMM GRANULOCYTES NFR BLD AUTO: 0.4 % (ref 0–0.5)
KETONES UR QL STRIP: NEGATIVE
LEUKOCYTE ESTERASE UR QL STRIP.AUTO: NEGATIVE
LYMPHOCYTES # BLD AUTO: 1.67 10*3/MM3 (ref 0.7–3.1)
LYMPHOCYTES NFR BLD AUTO: 10.5 % (ref 19.6–45.3)
Lab: ABNORMAL
MAGNESIUM SERPL-MCNC: 1.8 MG/DL (ref 1.6–2.6)
MCH RBC QN AUTO: 29.9 PG (ref 26.6–33)
MCHC RBC AUTO-ENTMCNC: 32.5 G/DL (ref 31.5–35.7)
MCV RBC AUTO: 92 FL (ref 79–97)
METHADONE UR QL SCN: NEGATIVE
METHGB BLD QL: 0.3 % (ref 0–3)
MODALITY: ABNORMAL
MONOCYTES # BLD AUTO: 1.11 10*3/MM3 (ref 0.1–0.9)
MONOCYTES NFR BLD AUTO: 7 % (ref 5–12)
NEUTROPHILS # BLD AUTO: 11.53 10*3/MM3 (ref 1.7–7)
NEUTROPHILS NFR BLD AUTO: 72.7 % (ref 42.7–76)
NITRITE UR QL STRIP: NEGATIVE
NOTE: ABNORMAL
NOTIFIED WHO: ABNORMAL
NRBC BLD AUTO-RTO: 0 /100 WBC (ref 0–0.2)
NT-PROBNP SERPL-MCNC: 549.7 PG/ML (ref 5–450)
OPIATES UR QL: POSITIVE
OXYCODONE UR QL SCN: POSITIVE
OXYHGB MFR BLDV: 95.2 % (ref 94–99)
PCO2 BLDA: 36.9 MM HG (ref 35–45)
PCO2 TEMP ADJ BLD: ABNORMAL MM[HG]
PCP UR QL SCN: NEGATIVE
PH BLDA: 7.42 PH UNITS (ref 7.35–7.45)
PH UR STRIP.AUTO: 6 [PH] (ref 5–8)
PH, TEMP CORRECTED: ABNORMAL
PLATELET # BLD AUTO: 296 10*3/MM3 (ref 140–450)
PMV BLD AUTO: 11.1 FL (ref 6–12)
PO2 BLDA: 76 MM HG (ref 83–108)
PO2 TEMP ADJ BLD: ABNORMAL MM[HG]
POTASSIUM BLD-SCNC: 4.3 MMOL/L (ref 3.5–5.2)
PROT SERPL-MCNC: 6.7 G/DL (ref 6–8.5)
PROT UR QL STRIP: NEGATIVE
RBC # BLD AUTO: 4.35 10*6/MM3 (ref 3.77–5.28)
RBC # UR: ABNORMAL /HPF
REF LAB TEST METHOD: ABNORMAL
SAO2 % BLDCOA: 96.2 % (ref 94–99)
SODIUM BLD-SCNC: 133 MMOL/L (ref 136–145)
SP GR UR STRIP: 1 (ref 1–1.03)
SQUAMOUS #/AREA URNS HPF: ABNORMAL /HPF
TROPONIN T SERPL-MCNC: <0.01 NG/ML (ref 0–0.03)
TSH SERPL DL<=0.05 MIU/L-ACNC: 1.02 UIU/ML (ref 0.27–4.2)
UROBILINOGEN UR QL STRIP: ABNORMAL
VENTILATOR MODE: ABNORMAL
WBC NRBC COR # BLD: 15.86 10*3/MM3 (ref 3.4–10.8)
WBC UR QL AUTO: ABNORMAL /HPF
WHOLE BLOOD HOLD SPECIMEN: NORMAL
WHOLE BLOOD HOLD SPECIMEN: NORMAL

## 2020-06-27 PROCEDURE — 25010000002 CEFEPIME 2 G/NS 100 ML SOLUTION: Performed by: PHYSICIAN ASSISTANT

## 2020-06-27 PROCEDURE — 72128 CT CHEST SPINE W/O DYE: CPT

## 2020-06-27 PROCEDURE — 80307 DRUG TEST PRSMV CHEM ANLYZR: CPT | Performed by: PHYSICIAN ASSISTANT

## 2020-06-27 PROCEDURE — 25010000002 MORPHINE PER 10 MG: Performed by: INTERNAL MEDICINE

## 2020-06-27 PROCEDURE — 83880 ASSAY OF NATRIURETIC PEPTIDE: CPT | Performed by: PHYSICIAN ASSISTANT

## 2020-06-27 PROCEDURE — 70450 CT HEAD/BRAIN W/O DYE: CPT | Performed by: RADIOLOGY

## 2020-06-27 PROCEDURE — 72128 CT CHEST SPINE W/O DYE: CPT | Performed by: RADIOLOGY

## 2020-06-27 PROCEDURE — 36600 WITHDRAWAL OF ARTERIAL BLOOD: CPT

## 2020-06-27 PROCEDURE — 86140 C-REACTIVE PROTEIN: CPT | Performed by: PHYSICIAN ASSISTANT

## 2020-06-27 PROCEDURE — 25010000002 DICYCLOMINE PER 20 MG: Performed by: PHYSICIAN ASSISTANT

## 2020-06-27 PROCEDURE — 25010000002 CEFTRIAXONE PER 250 MG: Performed by: INTERNAL MEDICINE

## 2020-06-27 PROCEDURE — 25010000002 MORPHINE PER 10 MG: Performed by: FAMILY MEDICINE

## 2020-06-27 PROCEDURE — 80074 ACUTE HEPATITIS PANEL: CPT | Performed by: PHYSICIAN ASSISTANT

## 2020-06-27 PROCEDURE — 82550 ASSAY OF CK (CPK): CPT | Performed by: PHYSICIAN ASSISTANT

## 2020-06-27 PROCEDURE — 80053 COMPREHEN METABOLIC PANEL: CPT | Performed by: PHYSICIAN ASSISTANT

## 2020-06-27 PROCEDURE — 99223 1ST HOSP IP/OBS HIGH 75: CPT | Performed by: PHYSICIAN ASSISTANT

## 2020-06-27 PROCEDURE — 83605 ASSAY OF LACTIC ACID: CPT | Performed by: PHYSICIAN ASSISTANT

## 2020-06-27 PROCEDURE — 84443 ASSAY THYROID STIM HORMONE: CPT | Performed by: PHYSICIAN ASSISTANT

## 2020-06-27 PROCEDURE — 0 IOVERSOL 68 % SOLUTION: Performed by: EMERGENCY MEDICINE

## 2020-06-27 PROCEDURE — 82375 ASSAY CARBOXYHB QUANT: CPT

## 2020-06-27 PROCEDURE — 70486 CT MAXILLOFACIAL W/O DYE: CPT

## 2020-06-27 PROCEDURE — 85025 COMPLETE CBC W/AUTO DIFF WBC: CPT | Performed by: PHYSICIAN ASSISTANT

## 2020-06-27 PROCEDURE — 72131 CT LUMBAR SPINE W/O DYE: CPT

## 2020-06-27 PROCEDURE — 82805 BLOOD GASES W/O2 SATURATION: CPT

## 2020-06-27 PROCEDURE — 93306 TTE W/DOPPLER COMPLETE: CPT

## 2020-06-27 PROCEDURE — 93010 ELECTROCARDIOGRAM REPORT: CPT | Performed by: INTERNAL MEDICINE

## 2020-06-27 PROCEDURE — 74177 CT ABD & PELVIS W/CONTRAST: CPT

## 2020-06-27 PROCEDURE — 72131 CT LUMBAR SPINE W/O DYE: CPT | Performed by: RADIOLOGY

## 2020-06-27 PROCEDURE — P9612 CATHETERIZE FOR URINE SPEC: HCPCS

## 2020-06-27 PROCEDURE — 93306 TTE W/DOPPLER COMPLETE: CPT | Performed by: INTERNAL MEDICINE

## 2020-06-27 PROCEDURE — 83735 ASSAY OF MAGNESIUM: CPT | Performed by: PHYSICIAN ASSISTANT

## 2020-06-27 PROCEDURE — 99285 EMERGENCY DEPT VISIT HI MDM: CPT

## 2020-06-27 PROCEDURE — 72125 CT NECK SPINE W/O DYE: CPT | Performed by: RADIOLOGY

## 2020-06-27 PROCEDURE — 83050 HGB METHEMOGLOBIN QUAN: CPT

## 2020-06-27 PROCEDURE — 82533 TOTAL CORTISOL: CPT | Performed by: INTERNAL MEDICINE

## 2020-06-27 PROCEDURE — 70450 CT HEAD/BRAIN W/O DYE: CPT

## 2020-06-27 PROCEDURE — 84484 ASSAY OF TROPONIN QUANT: CPT | Performed by: INTERNAL MEDICINE

## 2020-06-27 PROCEDURE — 25010000002 PROMETHAZINE PER 50 MG: Performed by: INTERNAL MEDICINE

## 2020-06-27 PROCEDURE — 71045 X-RAY EXAM CHEST 1 VIEW: CPT

## 2020-06-27 PROCEDURE — 71045 X-RAY EXAM CHEST 1 VIEW: CPT | Performed by: RADIOLOGY

## 2020-06-27 PROCEDURE — 74177 CT ABD & PELVIS W/CONTRAST: CPT | Performed by: RADIOLOGY

## 2020-06-27 PROCEDURE — 93005 ELECTROCARDIOGRAM TRACING: CPT | Performed by: PHYSICIAN ASSISTANT

## 2020-06-27 PROCEDURE — 72125 CT NECK SPINE W/O DYE: CPT

## 2020-06-27 PROCEDURE — 25010000002 ENOXAPARIN PER 10 MG: Performed by: INTERNAL MEDICINE

## 2020-06-27 PROCEDURE — 87040 BLOOD CULTURE FOR BACTERIA: CPT | Performed by: PHYSICIAN ASSISTANT

## 2020-06-27 PROCEDURE — 70486 CT MAXILLOFACIAL W/O DYE: CPT | Performed by: RADIOLOGY

## 2020-06-27 PROCEDURE — 84484 ASSAY OF TROPONIN QUANT: CPT | Performed by: PHYSICIAN ASSISTANT

## 2020-06-27 PROCEDURE — 81001 URINALYSIS AUTO W/SCOPE: CPT | Performed by: PHYSICIAN ASSISTANT

## 2020-06-27 RX ORDER — PROMETHAZINE HYDROCHLORIDE 25 MG/1
25 TABLET ORAL EVERY 6 HOURS PRN
Status: DISCONTINUED | OUTPATIENT
Start: 2020-06-27 | End: 2020-07-03 | Stop reason: HOSPADM

## 2020-06-27 RX ORDER — SODIUM CHLORIDE 9 MG/ML
50 INJECTION, SOLUTION INTRAVENOUS CONTINUOUS
Status: DISCONTINUED | OUTPATIENT
Start: 2020-06-27 | End: 2020-06-30

## 2020-06-27 RX ORDER — MORPHINE SULFATE 2 MG/ML
2 INJECTION, SOLUTION INTRAMUSCULAR; INTRAVENOUS ONCE
Status: COMPLETED | OUTPATIENT
Start: 2020-06-27 | End: 2020-06-27

## 2020-06-27 RX ORDER — SODIUM CHLORIDE 0.9 % (FLUSH) 0.9 %
10 SYRINGE (ML) INJECTION EVERY 12 HOURS SCHEDULED
Status: DISCONTINUED | OUTPATIENT
Start: 2020-06-27 | End: 2020-07-03 | Stop reason: HOSPADM

## 2020-06-27 RX ORDER — SODIUM CHLORIDE 0.9 % (FLUSH) 0.9 %
10 SYRINGE (ML) INJECTION AS NEEDED
Status: DISCONTINUED | OUTPATIENT
Start: 2020-06-27 | End: 2020-07-03 | Stop reason: HOSPADM

## 2020-06-27 RX ORDER — MORPHINE SULFATE 2 MG/ML
1 INJECTION, SOLUTION INTRAMUSCULAR; INTRAVENOUS EVERY 4 HOURS PRN
Status: DISCONTINUED | OUTPATIENT
Start: 2020-06-27 | End: 2020-06-30

## 2020-06-27 RX ORDER — QUETIAPINE FUMARATE 100 MG/1
100 TABLET, FILM COATED ORAL NIGHTLY
Status: ON HOLD | COMMUNITY
End: 2020-06-27

## 2020-06-27 RX ORDER — DICYCLOMINE HYDROCHLORIDE 10 MG/ML
20 INJECTION INTRAMUSCULAR ONCE
Status: COMPLETED | OUTPATIENT
Start: 2020-06-27 | End: 2020-06-27

## 2020-06-27 RX ADMIN — METRONIDAZOLE 500 MG: 500 INJECTION, SOLUTION INTRAVENOUS at 21:53

## 2020-06-27 RX ADMIN — ENOXAPARIN SODIUM 40 MG: 40 INJECTION SUBCUTANEOUS at 17:46

## 2020-06-27 RX ADMIN — METRONIDAZOLE 500 MG: 500 INJECTION, SOLUTION INTRAVENOUS at 14:19

## 2020-06-27 RX ADMIN — DICYCLOMINE HYDROCHLORIDE 20 MG: 20 INJECTION INTRAMUSCULAR at 12:04

## 2020-06-27 RX ADMIN — SODIUM CHLORIDE 1000 ML: 9 INJECTION, SOLUTION INTRAVENOUS at 14:19

## 2020-06-27 RX ADMIN — PROMETHAZINE HYDROCHLORIDE 12.5 MG: 25 INJECTION INTRAMUSCULAR; INTRAVENOUS at 17:46

## 2020-06-27 RX ADMIN — MORPHINE SULFATE 2 MG: 2 INJECTION, SOLUTION INTRAMUSCULAR; INTRAVENOUS at 14:46

## 2020-06-27 RX ADMIN — MORPHINE SULFATE 1 MG: 2 INJECTION, SOLUTION INTRAMUSCULAR; INTRAVENOUS at 17:47

## 2020-06-27 RX ADMIN — CEFEPIME 2 G: 2 INJECTION, POWDER, FOR SOLUTION INTRAVENOUS at 15:33

## 2020-06-27 RX ADMIN — PROMETHAZINE HYDROCHLORIDE 12.5 MG: 25 INJECTION INTRAMUSCULAR; INTRAVENOUS at 23:31

## 2020-06-27 RX ADMIN — SODIUM CHLORIDE 2 G: 900 INJECTION INTRAVENOUS at 20:44

## 2020-06-27 RX ADMIN — MORPHINE SULFATE 1 MG: 2 INJECTION, SOLUTION INTRAMUSCULAR; INTRAVENOUS at 21:53

## 2020-06-27 RX ADMIN — SODIUM CHLORIDE 1000 ML: 9 INJECTION, SOLUTION INTRAVENOUS at 12:35

## 2020-06-27 RX ADMIN — IOVERSOL 100 ML: 678 INJECTION INTRA-ARTERIAL; INTRAVENOUS at 13:35

## 2020-06-27 RX ADMIN — SODIUM CHLORIDE 100 ML/HR: 9 INJECTION, SOLUTION INTRAVENOUS at 17:47

## 2020-06-28 ENCOUNTER — APPOINTMENT (OUTPATIENT)
Dept: GENERAL RADIOLOGY | Facility: HOSPITAL | Age: 50
End: 2020-06-28

## 2020-06-28 LAB
027 TOXIN: NORMAL
ADV 40+41 DNA STL QL NAA+NON-PROBE: NOT DETECTED
ALBUMIN SERPL-MCNC: 3.47 G/DL (ref 3.5–5.2)
ALBUMIN/GLOB SERPL: 1.1 G/DL
ALP SERPL-CCNC: 112 U/L (ref 39–117)
ALT SERPL W P-5'-P-CCNC: 25 U/L (ref 1–33)
ANION GAP SERPL CALCULATED.3IONS-SCNC: 14 MMOL/L (ref 5–15)
AST SERPL-CCNC: 22 U/L (ref 1–32)
ASTRO TYP 1-8 RNA STL QL NAA+NON-PROBE: NOT DETECTED
BASOPHILS # BLD AUTO: 0.07 10*3/MM3 (ref 0–0.2)
BASOPHILS NFR BLD AUTO: 0.4 % (ref 0–1.5)
BH CV ECHO MEAS - % IVS THICK: 42.4 %
BH CV ECHO MEAS - % LVPW THICK: 76.8 %
BH CV ECHO MEAS - ACS: 1.3 CM
BH CV ECHO MEAS - AO MAX PG: 9.1 MMHG
BH CV ECHO MEAS - AO MEAN PG: 5 MMHG
BH CV ECHO MEAS - AO ROOT AREA (BSA CORRECTED): 1.7
BH CV ECHO MEAS - AO ROOT AREA: 6.2 CM^2
BH CV ECHO MEAS - AO ROOT DIAM: 2.8 CM
BH CV ECHO MEAS - AO V2 MAX: 151 CM/SEC
BH CV ECHO MEAS - AO V2 MEAN: 105 CM/SEC
BH CV ECHO MEAS - AO V2 VTI: 38.4 CM
BH CV ECHO MEAS - BSA(HAYCOCK): 1.7 M^2
BH CV ECHO MEAS - BSA: 1.7 M^2
BH CV ECHO MEAS - BZI_BMI: 28.3 KILOGRAMS/M^2
BH CV ECHO MEAS - BZI_METRIC_HEIGHT: 154.9 CM
BH CV ECHO MEAS - BZI_METRIC_WEIGHT: 68 KG
BH CV ECHO MEAS - EDV(CUBED): 139.8 ML
BH CV ECHO MEAS - EDV(MOD-SP4): 48 ML
BH CV ECHO MEAS - EDV(TEICH): 128.9 ML
BH CV ECHO MEAS - EF(CUBED): 86.6 %
BH CV ECHO MEAS - EF(MOD-SP4): 68.1 %
BH CV ECHO MEAS - EF(TEICH): 79.9 %
BH CV ECHO MEAS - ESV(CUBED): 18.7 ML
BH CV ECHO MEAS - ESV(MOD-SP4): 15.3 ML
BH CV ECHO MEAS - ESV(TEICH): 25.9 ML
BH CV ECHO MEAS - FS: 48.8 %
BH CV ECHO MEAS - IVS/LVPW: 1.1
BH CV ECHO MEAS - IVSD: 1.2 CM
BH CV ECHO MEAS - IVSS: 1.7 CM
BH CV ECHO MEAS - LV DIASTOLIC VOL/BSA (35-75): 28.7 ML/M^2
BH CV ECHO MEAS - LV MASS(C)D: 222.1 GRAMS
BH CV ECHO MEAS - LV MASS(C)DI: 132.9 GRAMS/M^2
BH CV ECHO MEAS - LV MASS(C)S: 180 GRAMS
BH CV ECHO MEAS - LV MASS(C)SI: 107.7 GRAMS/M^2
BH CV ECHO MEAS - LV SYSTOLIC VOL/BSA (12-30): 9.2 ML/M^2
BH CV ECHO MEAS - LVIDD: 5.2 CM
BH CV ECHO MEAS - LVIDS: 2.7 CM
BH CV ECHO MEAS - LVLD AP4: 5.8 CM
BH CV ECHO MEAS - LVLS AP4: 4.5 CM
BH CV ECHO MEAS - LVPWD: 1 CM
BH CV ECHO MEAS - LVPWS: 1.8 CM
BH CV ECHO MEAS - MV A MAX VEL: 91.7 CM/SEC
BH CV ECHO MEAS - MV E MAX VEL: 81 CM/SEC
BH CV ECHO MEAS - MV E/A: 0.88
BH CV ECHO MEAS - PA ACC TIME: 0.14 SEC
BH CV ECHO MEAS - PA PR(ACCEL): 14.2 MMHG
BH CV ECHO MEAS - RAP SYSTOLE: 10 MMHG
BH CV ECHO MEAS - RVSP: 35.3 MMHG
BH CV ECHO MEAS - SI(AO): 141.4 ML/M^2
BH CV ECHO MEAS - SI(CUBED): 72.4 ML/M^2
BH CV ECHO MEAS - SI(MOD-SP4): 19.6 ML/M^2
BH CV ECHO MEAS - SI(TEICH): 61.6 ML/M^2
BH CV ECHO MEAS - SV(AO): 236.4 ML
BH CV ECHO MEAS - SV(CUBED): 121.1 ML
BH CV ECHO MEAS - SV(MOD-SP4): 32.7 ML
BH CV ECHO MEAS - SV(TEICH): 103 ML
BH CV ECHO MEAS - TR MAX VEL: 249.5 CM/SEC
BILIRUB SERPL-MCNC: 0.6 MG/DL (ref 0.2–1.2)
BUN BLD-MCNC: 4 MG/DL (ref 6–20)
BUN/CREAT SERPL: 5.6 (ref 7–25)
C CAYETANENSIS DNA STL QL NAA+NON-PROBE: NOT DETECTED
C DIFF TOX GENS STL QL NAA+PROBE: NEGATIVE
CALCIUM SPEC-SCNC: 8.4 MG/DL (ref 8.6–10.5)
CAMPY SP DNA.DIARRHEA STL QL NAA+PROBE: NOT DETECTED
CHLORIDE SERPL-SCNC: 109 MMOL/L (ref 98–107)
CO2 SERPL-SCNC: 18 MMOL/L (ref 22–29)
CREAT BLD-MCNC: 0.71 MG/DL (ref 0.57–1)
CRP SERPL-MCNC: 8.45 MG/DL (ref 0–0.5)
CRYPTOSP STL CULT: NOT DETECTED
DEPRECATED RDW RBC AUTO: 44 FL (ref 37–54)
E COLI DNA SPEC QL NAA+PROBE: NOT DETECTED
E HISTOLYT AG STL-ACNC: NOT DETECTED
EAEC PAA PLAS AGGR+AATA ST NAA+NON-PRB: NOT DETECTED
EC STX1 + STX2 GENES STL NAA+PROBE: NOT DETECTED
EOSINOPHIL # BLD AUTO: 0.39 10*3/MM3 (ref 0–0.4)
EOSINOPHIL NFR BLD AUTO: 2.3 % (ref 0.3–6.2)
EPEC EAE GENE STL QL NAA+NON-PROBE: NOT DETECTED
ERYTHROCYTE [DISTWIDTH] IN BLOOD BY AUTOMATED COUNT: 13 % (ref 12.3–15.4)
ETEC LTA+ST1A+ST1B TOX ST NAA+NON-PROBE: NOT DETECTED
G LAMBLIA DNA SPEC QL NAA+PROBE: NOT DETECTED
GFR SERPL CREATININE-BSD FRML MDRD: 88 ML/MIN/1.73
GLOBULIN UR ELPH-MCNC: 3.1 GM/DL
GLUCOSE BLD-MCNC: 110 MG/DL (ref 65–99)
HCT VFR BLD AUTO: 38.6 % (ref 34–46.6)
HGB BLD-MCNC: 12.5 G/DL (ref 12–15.9)
HOLD SPECIMEN: NORMAL
IMM GRANULOCYTES # BLD AUTO: 0.14 10*3/MM3 (ref 0–0.05)
IMM GRANULOCYTES NFR BLD AUTO: 0.8 % (ref 0–0.5)
LV EF 2D ECHO EST: 65 %
LYMPHOCYTES # BLD AUTO: 1.52 10*3/MM3 (ref 0.7–3.1)
LYMPHOCYTES NFR BLD AUTO: 8.8 % (ref 19.6–45.3)
MAGNESIUM SERPL-MCNC: 1.7 MG/DL (ref 1.6–2.6)
MAXIMAL PREDICTED HEART RATE: 173 BPM
MCH RBC QN AUTO: 30 PG (ref 26.6–33)
MCHC RBC AUTO-ENTMCNC: 32.4 G/DL (ref 31.5–35.7)
MCV RBC AUTO: 92.6 FL (ref 79–97)
MONOCYTES # BLD AUTO: 0.73 10*3/MM3 (ref 0.1–0.9)
MONOCYTES NFR BLD AUTO: 4.2 % (ref 5–12)
NEUTROPHILS # BLD AUTO: 14.39 10*3/MM3 (ref 1.7–7)
NEUTROPHILS NFR BLD AUTO: 83.5 % (ref 42.7–76)
NOROVIRUS GI+II RNA STL QL NAA+NON-PROBE: NOT DETECTED
NRBC BLD AUTO-RTO: 0 /100 WBC (ref 0–0.2)
P SHIGELLOIDES DNA STL QL NAA+PROBE: NOT DETECTED
PHOSPHATE SERPL-MCNC: 3.2 MG/DL (ref 2.5–4.5)
PLATELET # BLD AUTO: 264 10*3/MM3 (ref 140–450)
PMV BLD AUTO: 10.6 FL (ref 6–12)
POTASSIUM BLD-SCNC: 3.4 MMOL/L (ref 3.5–5.2)
POTASSIUM BLD-SCNC: 3.5 MMOL/L (ref 3.5–5.2)
PROT SERPL-MCNC: 6.6 G/DL (ref 6–8.5)
RBC # BLD AUTO: 4.17 10*6/MM3 (ref 3.77–5.28)
RV RNA STL NAA+PROBE: NOT DETECTED
SALMONELLA DNA SPEC QL NAA+PROBE: NOT DETECTED
SAPO I+II+IV+V RNA STL QL NAA+NON-PROBE: NOT DETECTED
SHIGELLA SP+EIEC IPAH STL QL NAA+PROBE: NOT DETECTED
SODIUM BLD-SCNC: 141 MMOL/L (ref 136–145)
STRESS TARGET HR: 147 BPM
V CHOLERAE DNA SPEC QL NAA+PROBE: NOT DETECTED
VIBRIO DNA SPEC NAA+PROBE: NOT DETECTED
WBC NRBC COR # BLD: 17.24 10*3/MM3 (ref 3.4–10.8)
YERSINIA STL CULT: NOT DETECTED

## 2020-06-28 PROCEDURE — 87493 C DIFF AMPLIFIED PROBE: CPT | Performed by: PHYSICIAN ASSISTANT

## 2020-06-28 PROCEDURE — 74018 RADEX ABDOMEN 1 VIEW: CPT | Performed by: RADIOLOGY

## 2020-06-28 PROCEDURE — 25010000002 ENOXAPARIN PER 10 MG: Performed by: INTERNAL MEDICINE

## 2020-06-28 PROCEDURE — 74018 RADEX ABDOMEN 1 VIEW: CPT

## 2020-06-28 PROCEDURE — 80053 COMPREHEN METABOLIC PANEL: CPT | Performed by: INTERNAL MEDICINE

## 2020-06-28 PROCEDURE — 25010000002 CEFEPIME PER 500 MG: Performed by: INTERNAL MEDICINE

## 2020-06-28 PROCEDURE — 25010000002 MORPHINE PER 10 MG: Performed by: INTERNAL MEDICINE

## 2020-06-28 PROCEDURE — 84132 ASSAY OF SERUM POTASSIUM: CPT | Performed by: INTERNAL MEDICINE

## 2020-06-28 PROCEDURE — 83735 ASSAY OF MAGNESIUM: CPT | Performed by: INTERNAL MEDICINE

## 2020-06-28 PROCEDURE — 25010000002 PROMETHAZINE PER 50 MG: Performed by: INTERNAL MEDICINE

## 2020-06-28 PROCEDURE — 99233 SBSQ HOSP IP/OBS HIGH 50: CPT | Performed by: INTERNAL MEDICINE

## 2020-06-28 PROCEDURE — 85025 COMPLETE CBC W/AUTO DIFF WBC: CPT | Performed by: INTERNAL MEDICINE

## 2020-06-28 PROCEDURE — 86140 C-REACTIVE PROTEIN: CPT | Performed by: INTERNAL MEDICINE

## 2020-06-28 PROCEDURE — 25010000002 METHYLNALTREXONE 12 MG/0.6ML SOLUTION: Performed by: INTERNAL MEDICINE

## 2020-06-28 PROCEDURE — 25010000002 HYDRALAZINE PER 20 MG: Performed by: INTERNAL MEDICINE

## 2020-06-28 PROCEDURE — 25010000002 METOCLOPRAMIDE PER 10 MG: Performed by: INTERNAL MEDICINE

## 2020-06-28 PROCEDURE — 25010000003 MAGNESIUM SULFATE 4 GM/100ML SOLUTION: Performed by: INTERNAL MEDICINE

## 2020-06-28 PROCEDURE — 25010000003 POTASSIUM CHLORIDE 10 MEQ/100ML SOLUTION: Performed by: INTERNAL MEDICINE

## 2020-06-28 PROCEDURE — 0097U HC BIOFIRE FILMARRAY GI PANEL: CPT | Performed by: PHYSICIAN ASSISTANT

## 2020-06-28 PROCEDURE — 84100 ASSAY OF PHOSPHORUS: CPT | Performed by: INTERNAL MEDICINE

## 2020-06-28 RX ORDER — MAGNESIUM SULFATE HEPTAHYDRATE 40 MG/ML
4 INJECTION, SOLUTION INTRAVENOUS AS NEEDED
Status: DISCONTINUED | OUTPATIENT
Start: 2020-06-28 | End: 2020-07-03 | Stop reason: HOSPADM

## 2020-06-28 RX ORDER — POTASSIUM CHLORIDE 1.5 G/1.77G
40 POWDER, FOR SOLUTION ORAL AS NEEDED
Status: DISCONTINUED | OUTPATIENT
Start: 2020-06-28 | End: 2020-07-03 | Stop reason: HOSPADM

## 2020-06-28 RX ORDER — POTASSIUM CHLORIDE 7.45 MG/ML
10 INJECTION INTRAVENOUS
Status: COMPLETED | OUTPATIENT
Start: 2020-06-28 | End: 2020-06-29

## 2020-06-28 RX ORDER — CLONIDINE HYDROCHLORIDE 0.1 MG/1
0.1 TABLET ORAL ONCE
Status: COMPLETED | OUTPATIENT
Start: 2020-06-28 | End: 2020-06-28

## 2020-06-28 RX ORDER — BISACODYL 10 MG
10 SUPPOSITORY, RECTAL RECTAL ONCE
Status: COMPLETED | OUTPATIENT
Start: 2020-06-28 | End: 2020-06-28

## 2020-06-28 RX ORDER — HYDRALAZINE HYDROCHLORIDE 20 MG/ML
10 INJECTION INTRAMUSCULAR; INTRAVENOUS EVERY 4 HOURS PRN
Status: DISCONTINUED | OUTPATIENT
Start: 2020-06-28 | End: 2020-07-03 | Stop reason: HOSPADM

## 2020-06-28 RX ORDER — HYDRALAZINE HYDROCHLORIDE 20 MG/ML
10 INJECTION INTRAMUSCULAR; INTRAVENOUS ONCE
Status: COMPLETED | OUTPATIENT
Start: 2020-06-28 | End: 2020-06-28

## 2020-06-28 RX ORDER — METOCLOPRAMIDE HYDROCHLORIDE 5 MG/ML
5 INJECTION INTRAMUSCULAR; INTRAVENOUS ONCE
Status: COMPLETED | OUTPATIENT
Start: 2020-06-28 | End: 2020-06-28

## 2020-06-28 RX ORDER — MAGNESIUM SULFATE HEPTAHYDRATE 40 MG/ML
4 INJECTION, SOLUTION INTRAVENOUS ONCE
Status: COMPLETED | OUTPATIENT
Start: 2020-06-28 | End: 2020-06-28

## 2020-06-28 RX ORDER — POTASSIUM CHLORIDE 7.45 MG/ML
10 INJECTION INTRAVENOUS
Status: DISCONTINUED | OUTPATIENT
Start: 2020-06-28 | End: 2020-07-03 | Stop reason: HOSPADM

## 2020-06-28 RX ORDER — MAGNESIUM SULFATE HEPTAHYDRATE 40 MG/ML
4 INJECTION, SOLUTION INTRAVENOUS ONCE
Status: DISCONTINUED | OUTPATIENT
Start: 2020-06-28 | End: 2020-06-28 | Stop reason: SDUPTHER

## 2020-06-28 RX ORDER — POTASSIUM CHLORIDE 20 MEQ/1
40 TABLET, EXTENDED RELEASE ORAL EVERY 4 HOURS
Status: DISCONTINUED | OUTPATIENT
Start: 2020-06-28 | End: 2020-06-28

## 2020-06-28 RX ORDER — MAGNESIUM SULFATE HEPTAHYDRATE 40 MG/ML
2 INJECTION, SOLUTION INTRAVENOUS AS NEEDED
Status: DISCONTINUED | OUTPATIENT
Start: 2020-06-28 | End: 2020-07-03 | Stop reason: HOSPADM

## 2020-06-28 RX ORDER — POTASSIUM CHLORIDE 7.45 MG/ML
10 INJECTION INTRAVENOUS
Status: COMPLETED | OUTPATIENT
Start: 2020-06-28 | End: 2020-06-28

## 2020-06-28 RX ORDER — POTASSIUM CHLORIDE 750 MG/1
40 CAPSULE, EXTENDED RELEASE ORAL AS NEEDED
Status: DISCONTINUED | OUTPATIENT
Start: 2020-06-28 | End: 2020-07-03 | Stop reason: HOSPADM

## 2020-06-28 RX ADMIN — ENOXAPARIN SODIUM 40 MG: 40 INJECTION SUBCUTANEOUS at 17:55

## 2020-06-28 RX ADMIN — POTASSIUM CHLORIDE 10 MEQ: 7.46 INJECTION, SOLUTION INTRAVENOUS at 11:52

## 2020-06-28 RX ADMIN — PROMETHAZINE HYDROCHLORIDE 12.5 MG: 25 INJECTION INTRAMUSCULAR; INTRAVENOUS at 05:32

## 2020-06-28 RX ADMIN — MORPHINE SULFATE 1 MG: 2 INJECTION, SOLUTION INTRAMUSCULAR; INTRAVENOUS at 10:16

## 2020-06-28 RX ADMIN — POTASSIUM CHLORIDE 10 MEQ: 7.46 INJECTION, SOLUTION INTRAVENOUS at 13:53

## 2020-06-28 RX ADMIN — POTASSIUM CHLORIDE 10 MEQ: 7.46 INJECTION, SOLUTION INTRAVENOUS at 10:21

## 2020-06-28 RX ADMIN — METRONIDAZOLE 500 MG: 500 INJECTION, SOLUTION INTRAVENOUS at 14:47

## 2020-06-28 RX ADMIN — MAGNESIUM SULFATE HEPTAHYDRATE 4 G: 40 INJECTION, SOLUTION INTRAVENOUS at 10:21

## 2020-06-28 RX ADMIN — POTASSIUM CHLORIDE 10 MEQ: 7.46 INJECTION, SOLUTION INTRAVENOUS at 21:28

## 2020-06-28 RX ADMIN — METHYLNALTREXONE BROMIDE 8 MG: 12 INJECTION, SOLUTION SUBCUTANEOUS at 17:55

## 2020-06-28 RX ADMIN — METOCLOPRAMIDE 5 MG: 5 INJECTION, SOLUTION INTRAMUSCULAR; INTRAVENOUS at 11:52

## 2020-06-28 RX ADMIN — METRONIDAZOLE 500 MG: 500 INJECTION, SOLUTION INTRAVENOUS at 21:28

## 2020-06-28 RX ADMIN — BISACODYL 10 MG: 10 SUPPOSITORY RECTAL at 14:47

## 2020-06-28 RX ADMIN — POTASSIUM CHLORIDE 10 MEQ: 7.46 INJECTION, SOLUTION INTRAVENOUS at 12:39

## 2020-06-28 RX ADMIN — METRONIDAZOLE 500 MG: 500 INJECTION, SOLUTION INTRAVENOUS at 05:32

## 2020-06-28 RX ADMIN — CLONIDINE HYDROCHLORIDE 0.1 MG: 0.1 TABLET ORAL at 18:35

## 2020-06-28 RX ADMIN — MORPHINE SULFATE 1 MG: 2 INJECTION, SOLUTION INTRAMUSCULAR; INTRAVENOUS at 17:55

## 2020-06-28 RX ADMIN — PROMETHAZINE HYDROCHLORIDE 12.5 MG: 25 INJECTION INTRAMUSCULAR; INTRAVENOUS at 17:55

## 2020-06-28 RX ADMIN — SODIUM CHLORIDE 100 ML/HR: 9 INJECTION, SOLUTION INTRAVENOUS at 05:33

## 2020-06-28 RX ADMIN — CEFEPIME 2 G: 2 INJECTION, POWDER, FOR SOLUTION INTRAVENOUS at 17:55

## 2020-06-28 RX ADMIN — POTASSIUM CHLORIDE 10 MEQ: 7.46 INJECTION, SOLUTION INTRAVENOUS at 19:46

## 2020-06-28 RX ADMIN — POTASSIUM CHLORIDE 10 MEQ: 7.46 INJECTION, SOLUTION INTRAVENOUS at 22:37

## 2020-06-28 RX ADMIN — MORPHINE SULFATE 1 MG: 2 INJECTION, SOLUTION INTRAMUSCULAR; INTRAVENOUS at 04:21

## 2020-06-28 RX ADMIN — CEFEPIME 2 G: 2 INJECTION, POWDER, FOR SOLUTION INTRAVENOUS at 10:21

## 2020-06-28 RX ADMIN — HYDRALAZINE HYDROCHLORIDE 10 MG: 20 INJECTION INTRAMUSCULAR; INTRAVENOUS at 16:56

## 2020-06-28 RX ADMIN — POTASSIUM CHLORIDE 10 MEQ: 7.46 INJECTION, SOLUTION INTRAVENOUS at 23:38

## 2020-06-29 ENCOUNTER — APPOINTMENT (OUTPATIENT)
Dept: GENERAL RADIOLOGY | Facility: HOSPITAL | Age: 50
End: 2020-06-29

## 2020-06-29 LAB
ALBUMIN SERPL-MCNC: 3.17 G/DL (ref 3.5–5.2)
ALBUMIN/GLOB SERPL: 0.8 G/DL
ALP SERPL-CCNC: 116 U/L (ref 39–117)
ALT SERPL W P-5'-P-CCNC: 24 U/L (ref 1–33)
ANION GAP SERPL CALCULATED.3IONS-SCNC: 16.9 MMOL/L (ref 5–15)
AST SERPL-CCNC: 42 U/L (ref 1–32)
BILIRUB SERPL-MCNC: 1 MG/DL (ref 0.2–1.2)
BUN BLD-MCNC: 4 MG/DL (ref 6–20)
BUN/CREAT SERPL: 7.7 (ref 7–25)
CALCIUM SPEC-SCNC: 8.9 MG/DL (ref 8.6–10.5)
CHLORIDE SERPL-SCNC: 105 MMOL/L (ref 98–107)
CO2 SERPL-SCNC: 12.1 MMOL/L (ref 22–29)
CREAT BLD-MCNC: 0.52 MG/DL (ref 0.57–1)
CRP SERPL-MCNC: 12.45 MG/DL (ref 0–0.5)
DEPRECATED RDW RBC AUTO: 47.7 FL (ref 37–54)
ERYTHROCYTE [DISTWIDTH] IN BLOOD BY AUTOMATED COUNT: 13.3 % (ref 12.3–15.4)
GFR SERPL CREATININE-BSD FRML MDRD: 126 ML/MIN/1.73
GLOBULIN UR ELPH-MCNC: 3.8 GM/DL
GLUCOSE BLD-MCNC: 121 MG/DL (ref 65–99)
HCT VFR BLD AUTO: 41.5 % (ref 34–46.6)
HGB BLD-MCNC: 12.6 G/DL (ref 12–15.9)
HYPOCHROMIA BLD QL: ABNORMAL
LYMPHOCYTES # BLD MANUAL: 1.29 10*3/MM3 (ref 0.7–3.1)
LYMPHOCYTES NFR BLD MANUAL: 3 % (ref 5–12)
LYMPHOCYTES NFR BLD MANUAL: 6 % (ref 19.6–45.3)
MAGNESIUM SERPL-MCNC: 2.4 MG/DL (ref 1.6–2.6)
MCH RBC QN AUTO: 29.9 PG (ref 26.6–33)
MCHC RBC AUTO-ENTMCNC: 30.4 G/DL (ref 31.5–35.7)
MCV RBC AUTO: 98.3 FL (ref 79–97)
MONOCYTES # BLD AUTO: 0.65 10*3/MM3 (ref 0.1–0.9)
NEUTROPHILS # BLD AUTO: 19.63 10*3/MM3 (ref 1.7–7)
NEUTROPHILS NFR BLD MANUAL: 91 % (ref 42.7–76)
PHOSPHATE SERPL-MCNC: 2.1 MG/DL (ref 2.5–4.5)
PLAT MORPH BLD: NORMAL
PLATELET # BLD AUTO: 288 10*3/MM3 (ref 140–450)
PMV BLD AUTO: 10.8 FL (ref 6–12)
POTASSIUM BLD-SCNC: 4 MMOL/L (ref 3.5–5.2)
POTASSIUM BLD-SCNC: 4 MMOL/L (ref 3.5–5.2)
PROT SERPL-MCNC: 7 G/DL (ref 6–8.5)
RBC # BLD AUTO: 4.22 10*6/MM3 (ref 3.77–5.28)
SCAN SLIDE: NORMAL
SODIUM BLD-SCNC: 134 MMOL/L (ref 136–145)
WBC NRBC COR # BLD: 21.57 10*3/MM3 (ref 3.4–10.8)

## 2020-06-29 PROCEDURE — 85025 COMPLETE CBC W/AUTO DIFF WBC: CPT | Performed by: INTERNAL MEDICINE

## 2020-06-29 PROCEDURE — 25010000002 ENOXAPARIN PER 10 MG: Performed by: INTERNAL MEDICINE

## 2020-06-29 PROCEDURE — 25010000002 PROMETHAZINE PER 50 MG: Performed by: INTERNAL MEDICINE

## 2020-06-29 PROCEDURE — 84100 ASSAY OF PHOSPHORUS: CPT | Performed by: INTERNAL MEDICINE

## 2020-06-29 PROCEDURE — 63710000001 PROMETHAZINE PER 25 MG: Performed by: INTERNAL MEDICINE

## 2020-06-29 PROCEDURE — 99232 SBSQ HOSP IP/OBS MODERATE 35: CPT | Performed by: INTERNAL MEDICINE

## 2020-06-29 PROCEDURE — 80053 COMPREHEN METABOLIC PANEL: CPT | Performed by: INTERNAL MEDICINE

## 2020-06-29 PROCEDURE — 25010000002 HYDRALAZINE PER 20 MG: Performed by: INTERNAL MEDICINE

## 2020-06-29 PROCEDURE — 74018 RADEX ABDOMEN 1 VIEW: CPT | Performed by: RADIOLOGY

## 2020-06-29 PROCEDURE — 74018 RADEX ABDOMEN 1 VIEW: CPT

## 2020-06-29 PROCEDURE — 83735 ASSAY OF MAGNESIUM: CPT | Performed by: INTERNAL MEDICINE

## 2020-06-29 PROCEDURE — 86140 C-REACTIVE PROTEIN: CPT | Performed by: INTERNAL MEDICINE

## 2020-06-29 PROCEDURE — 85007 BL SMEAR W/DIFF WBC COUNT: CPT | Performed by: INTERNAL MEDICINE

## 2020-06-29 PROCEDURE — 25010000002 MORPHINE PER 10 MG: Performed by: INTERNAL MEDICINE

## 2020-06-29 PROCEDURE — 84132 ASSAY OF SERUM POTASSIUM: CPT | Performed by: HOSPITALIST

## 2020-06-29 PROCEDURE — 25010000002 CEFEPIME PER 500 MG: Performed by: INTERNAL MEDICINE

## 2020-06-29 RX ORDER — METOPROLOL TARTRATE 5 MG/5ML
5 INJECTION INTRAVENOUS EVERY 6 HOURS PRN
Status: DISCONTINUED | OUTPATIENT
Start: 2020-06-29 | End: 2020-07-03 | Stop reason: HOSPADM

## 2020-06-29 RX ADMIN — CEFEPIME 2 G: 2 INJECTION, POWDER, FOR SOLUTION INTRAVENOUS at 01:15

## 2020-06-29 RX ADMIN — CEFEPIME 2 G: 2 INJECTION, POWDER, FOR SOLUTION INTRAVENOUS at 09:08

## 2020-06-29 RX ADMIN — METRONIDAZOLE 500 MG: 500 INJECTION, SOLUTION INTRAVENOUS at 22:28

## 2020-06-29 RX ADMIN — ENOXAPARIN SODIUM 40 MG: 40 INJECTION SUBCUTANEOUS at 17:46

## 2020-06-29 RX ADMIN — SODIUM CHLORIDE 75 ML/HR: 9 INJECTION, SOLUTION INTRAVENOUS at 09:10

## 2020-06-29 RX ADMIN — MORPHINE SULFATE 1 MG: 2 INJECTION, SOLUTION INTRAMUSCULAR; INTRAVENOUS at 16:16

## 2020-06-29 RX ADMIN — METRONIDAZOLE 500 MG: 500 INJECTION, SOLUTION INTRAVENOUS at 14:42

## 2020-06-29 RX ADMIN — PROMETHAZINE HYDROCHLORIDE 12.5 MG: 25 INJECTION INTRAMUSCULAR; INTRAVENOUS at 01:16

## 2020-06-29 RX ADMIN — METOPROLOL TARTRATE 12.5 MG: 25 TABLET, FILM COATED ORAL at 11:04

## 2020-06-29 RX ADMIN — HYDRALAZINE HYDROCHLORIDE 10 MG: 20 INJECTION INTRAMUSCULAR; INTRAVENOUS at 00:33

## 2020-06-29 RX ADMIN — MORPHINE SULFATE 1 MG: 2 INJECTION, SOLUTION INTRAMUSCULAR; INTRAVENOUS at 20:41

## 2020-06-29 RX ADMIN — PROMETHAZINE HYDROCHLORIDE 25 MG: 25 TABLET ORAL at 17:25

## 2020-06-29 RX ADMIN — METOPROLOL TARTRATE 5 MG: 1 INJECTION, SOLUTION INTRAVENOUS at 05:52

## 2020-06-29 RX ADMIN — CEFEPIME 2 G: 2 INJECTION, POWDER, FOR SOLUTION INTRAVENOUS at 17:46

## 2020-06-29 RX ADMIN — METOPROLOL TARTRATE 12.5 MG: 25 TABLET, FILM COATED ORAL at 20:41

## 2020-06-29 RX ADMIN — SODIUM PHOSPHATE, MONOBASIC, MONOHYDRATE 15 MMOL: 276; 142 INJECTION, SOLUTION INTRAVENOUS at 11:11

## 2020-06-29 RX ADMIN — METRONIDAZOLE 500 MG: 500 INJECTION, SOLUTION INTRAVENOUS at 05:13

## 2020-06-29 RX ADMIN — POLYETHYLENE GLYCOL (3350) 17 G: 17 POWDER, FOR SOLUTION ORAL at 16:22

## 2020-06-30 ENCOUNTER — APPOINTMENT (OUTPATIENT)
Dept: CT IMAGING | Facility: HOSPITAL | Age: 50
End: 2020-06-30

## 2020-06-30 LAB
6-ACETYL MORPHINE: NEGATIVE
ALBUMIN SERPL-MCNC: 3.36 G/DL (ref 3.5–5.2)
ALBUMIN/GLOB SERPL: 1 G/DL
ALP SERPL-CCNC: 96 U/L (ref 39–117)
ALT SERPL W P-5'-P-CCNC: 18 U/L (ref 1–33)
AMPHET+METHAMPHET UR QL: NEGATIVE
ANION GAP SERPL CALCULATED.3IONS-SCNC: 13.5 MMOL/L (ref 5–15)
AST SERPL-CCNC: 24 U/L (ref 1–32)
BARBITURATES UR QL SCN: NEGATIVE
BASOPHILS # BLD AUTO: 0.07 10*3/MM3 (ref 0–0.2)
BASOPHILS NFR BLD AUTO: 0.3 % (ref 0–1.5)
BENZODIAZ UR QL SCN: POSITIVE
BILIRUB SERPL-MCNC: 0.7 MG/DL (ref 0.2–1.2)
BUN SERPL-MCNC: 7 MG/DL (ref 6–20)
BUN/CREAT SERPL: 11.9 (ref 7–25)
BUPRENORPHINE SERPL-MCNC: NEGATIVE NG/ML
CALCIUM SPEC-SCNC: 8.6 MG/DL (ref 8.6–10.5)
CANNABINOIDS SERPL QL: NEGATIVE
CHLORIDE SERPL-SCNC: 106 MMOL/L (ref 98–107)
CO2 SERPL-SCNC: 16.5 MMOL/L (ref 22–29)
COCAINE UR QL: NEGATIVE
CREAT SERPL-MCNC: 0.59 MG/DL (ref 0.57–1)
CRP SERPL-MCNC: 8.15 MG/DL (ref 0–0.5)
DEPRECATED RDW RBC AUTO: 44.6 FL (ref 37–54)
EOSINOPHIL # BLD AUTO: 0.1 10*3/MM3 (ref 0–0.4)
EOSINOPHIL NFR BLD AUTO: 0.5 % (ref 0.3–6.2)
ERYTHROCYTE [DISTWIDTH] IN BLOOD BY AUTOMATED COUNT: 13.2 % (ref 12.3–15.4)
GFR SERPL CREATININE-BSD FRML MDRD: 109 ML/MIN/1.73
GLOBULIN UR ELPH-MCNC: 3.3 GM/DL
GLUCOSE SERPL-MCNC: 133 MG/DL (ref 65–99)
HCT VFR BLD AUTO: 39.8 % (ref 34–46.6)
HGB BLD-MCNC: 12.9 G/DL (ref 12–15.9)
IMM GRANULOCYTES # BLD AUTO: 0.21 10*3/MM3 (ref 0–0.05)
IMM GRANULOCYTES NFR BLD AUTO: 1 % (ref 0–0.5)
LYMPHOCYTES # BLD AUTO: 1.94 10*3/MM3 (ref 0.7–3.1)
LYMPHOCYTES NFR BLD AUTO: 8.9 % (ref 19.6–45.3)
MAGNESIUM SERPL-MCNC: 2.1 MG/DL (ref 1.6–2.6)
MCH RBC QN AUTO: 29.6 PG (ref 26.6–33)
MCHC RBC AUTO-ENTMCNC: 32.4 G/DL (ref 31.5–35.7)
MCV RBC AUTO: 91.3 FL (ref 79–97)
METHADONE UR QL SCN: NEGATIVE
MONOCYTES # BLD AUTO: 1.06 10*3/MM3 (ref 0.1–0.9)
MONOCYTES NFR BLD AUTO: 4.9 % (ref 5–12)
NEUTROPHILS NFR BLD AUTO: 18.4 10*3/MM3 (ref 1.7–7)
NEUTROPHILS NFR BLD AUTO: 84.4 % (ref 42.7–76)
NRBC BLD AUTO-RTO: 0 /100 WBC (ref 0–0.2)
OPIATES UR QL: POSITIVE
OXYCODONE UR QL SCN: NEGATIVE
PCP UR QL SCN: NEGATIVE
PHOSPHATE SERPL-MCNC: 2 MG/DL (ref 2.5–4.5)
PHOSPHATE SERPL-MCNC: 2.1 MG/DL (ref 2.5–4.5)
PLATELET # BLD AUTO: 383 10*3/MM3 (ref 140–450)
PMV BLD AUTO: 10.9 FL (ref 6–12)
POTASSIUM SERPL-SCNC: 3.5 MMOL/L (ref 3.5–5.2)
POTASSIUM SERPL-SCNC: 4.6 MMOL/L (ref 3.5–5.2)
PROT SERPL-MCNC: 6.7 G/DL (ref 6–8.5)
RBC # BLD AUTO: 4.36 10*6/MM3 (ref 3.77–5.28)
SODIUM SERPL-SCNC: 136 MMOL/L (ref 136–145)
TROPONIN T SERPL-MCNC: 0.08 NG/ML (ref 0–0.03)
TROPONIN T SERPL-MCNC: 0.12 NG/ML (ref 0–0.03)
TROPONIN T SERPL-MCNC: 0.14 NG/ML (ref 0–0.03)
WBC # BLD AUTO: 21.78 10*3/MM3 (ref 3.4–10.8)

## 2020-06-30 PROCEDURE — 99253 IP/OBS CNSLTJ NEW/EST LOW 45: CPT | Performed by: SPECIALIST

## 2020-06-30 PROCEDURE — 93005 ELECTROCARDIOGRAM TRACING: CPT | Performed by: SPECIALIST

## 2020-06-30 PROCEDURE — 93005 ELECTROCARDIOGRAM TRACING: CPT | Performed by: INTERNAL MEDICINE

## 2020-06-30 PROCEDURE — 84484 ASSAY OF TROPONIN QUANT: CPT | Performed by: INTERNAL MEDICINE

## 2020-06-30 PROCEDURE — 85025 COMPLETE CBC W/AUTO DIFF WBC: CPT | Performed by: INTERNAL MEDICINE

## 2020-06-30 PROCEDURE — 25010000003 POTASSIUM CHLORIDE 10 MEQ/100ML SOLUTION: Performed by: INTERNAL MEDICINE

## 2020-06-30 PROCEDURE — 94799 UNLISTED PULMONARY SVC/PX: CPT

## 2020-06-30 PROCEDURE — 0 IOVERSOL 74 % SOLUTION: Performed by: INTERNAL MEDICINE

## 2020-06-30 PROCEDURE — 84132 ASSAY OF SERUM POTASSIUM: CPT | Performed by: INTERNAL MEDICINE

## 2020-06-30 PROCEDURE — 71275 CT ANGIOGRAPHY CHEST: CPT

## 2020-06-30 PROCEDURE — 80053 COMPREHEN METABOLIC PANEL: CPT | Performed by: INTERNAL MEDICINE

## 2020-06-30 PROCEDURE — 86140 C-REACTIVE PROTEIN: CPT | Performed by: INTERNAL MEDICINE

## 2020-06-30 PROCEDURE — 25010000002 MORPHINE PER 10 MG: Performed by: INTERNAL MEDICINE

## 2020-06-30 PROCEDURE — 93010 ELECTROCARDIOGRAM REPORT: CPT | Performed by: INTERNAL MEDICINE

## 2020-06-30 PROCEDURE — 25010000002 CEFEPIME PER 500 MG: Performed by: INTERNAL MEDICINE

## 2020-06-30 PROCEDURE — 63710000001 PROMETHAZINE PER 25 MG: Performed by: INTERNAL MEDICINE

## 2020-06-30 PROCEDURE — 80307 DRUG TEST PRSMV CHEM ANLYZR: CPT | Performed by: NURSE PRACTITIONER

## 2020-06-30 PROCEDURE — 84100 ASSAY OF PHOSPHORUS: CPT | Performed by: INTERNAL MEDICINE

## 2020-06-30 PROCEDURE — 71275 CT ANGIOGRAPHY CHEST: CPT | Performed by: RADIOLOGY

## 2020-06-30 PROCEDURE — 05HF33Z INSERTION OF INFUSION DEVICE INTO LEFT CEPHALIC VEIN, PERCUTANEOUS APPROACH: ICD-10-PCS | Performed by: INTERNAL MEDICINE

## 2020-06-30 PROCEDURE — 25010000002 ENOXAPARIN PER 10 MG: Performed by: INTERNAL MEDICINE

## 2020-06-30 PROCEDURE — C1751 CATH, INF, PER/CENT/MIDLINE: HCPCS

## 2020-06-30 PROCEDURE — 99233 SBSQ HOSP IP/OBS HIGH 50: CPT | Performed by: INTERNAL MEDICINE

## 2020-06-30 PROCEDURE — 25010000002 PROMETHAZINE PER 50 MG: Performed by: INTERNAL MEDICINE

## 2020-06-30 PROCEDURE — 83735 ASSAY OF MAGNESIUM: CPT | Performed by: INTERNAL MEDICINE

## 2020-06-30 RX ORDER — HYDROXYZINE HYDROCHLORIDE 25 MG/1
25 TABLET, FILM COATED ORAL 4 TIMES DAILY PRN
Status: DISCONTINUED | OUTPATIENT
Start: 2020-06-30 | End: 2020-07-03 | Stop reason: HOSPADM

## 2020-06-30 RX ORDER — SODIUM CHLORIDE 0.9 % (FLUSH) 0.9 %
10 SYRINGE (ML) INJECTION AS NEEDED
Status: DISCONTINUED | OUTPATIENT
Start: 2020-06-30 | End: 2020-07-03 | Stop reason: HOSPADM

## 2020-06-30 RX ORDER — ROSUVASTATIN CALCIUM 10 MG/1
10 TABLET, COATED ORAL NIGHTLY
Status: DISCONTINUED | OUTPATIENT
Start: 2020-06-30 | End: 2020-07-03 | Stop reason: HOSPADM

## 2020-06-30 RX ORDER — METOPROLOL SUCCINATE 50 MG/1
50 TABLET, EXTENDED RELEASE ORAL DAILY
COMMUNITY
End: 2021-08-25

## 2020-06-30 RX ORDER — QUETIAPINE FUMARATE 50 MG/1
50 TABLET, FILM COATED ORAL NIGHTLY
COMMUNITY
End: 2020-07-03

## 2020-06-30 RX ORDER — POTASSIUM CHLORIDE 7.45 MG/ML
10 INJECTION INTRAVENOUS
Status: DISCONTINUED | OUTPATIENT
Start: 2020-06-30 | End: 2020-06-30

## 2020-06-30 RX ORDER — METOPROLOL TARTRATE 50 MG/1
50 TABLET, FILM COATED ORAL EVERY 12 HOURS SCHEDULED
Status: DISCONTINUED | OUTPATIENT
Start: 2020-06-30 | End: 2020-07-03 | Stop reason: HOSPADM

## 2020-06-30 RX ORDER — QUETIAPINE FUMARATE 25 MG/1
50 TABLET, FILM COATED ORAL NIGHTLY
Status: CANCELLED | OUTPATIENT
Start: 2020-06-30

## 2020-06-30 RX ORDER — SODIUM CHLORIDE 0.9 % (FLUSH) 0.9 %
10 SYRINGE (ML) INJECTION EVERY 12 HOURS SCHEDULED
Status: DISCONTINUED | OUTPATIENT
Start: 2020-06-30 | End: 2020-07-03 | Stop reason: HOSPADM

## 2020-06-30 RX ORDER — HYDROXYZINE 50 MG/1
50 TABLET, FILM COATED ORAL 4 TIMES DAILY
Status: CANCELLED | OUTPATIENT
Start: 2020-06-30

## 2020-06-30 RX ORDER — FLUOXETINE HYDROCHLORIDE 20 MG/1
40 CAPSULE ORAL DAILY
Status: CANCELLED | OUTPATIENT
Start: 2020-06-30

## 2020-06-30 RX ORDER — TIZANIDINE 4 MG/1
4 TABLET ORAL EVERY 8 HOURS PRN
COMMUNITY
End: 2021-08-27 | Stop reason: HOSPADM

## 2020-06-30 RX ORDER — VALSARTAN 80 MG/1
80 TABLET ORAL 2 TIMES DAILY
COMMUNITY
End: 2021-08-25

## 2020-06-30 RX ORDER — TIZANIDINE 4 MG/1
4 TABLET ORAL EVERY 8 HOURS PRN
Status: CANCELLED | OUTPATIENT
Start: 2020-06-30

## 2020-06-30 RX ORDER — ASPIRIN 81 MG/1
81 TABLET ORAL DAILY
Status: DISCONTINUED | OUTPATIENT
Start: 2020-06-30 | End: 2020-07-03

## 2020-06-30 RX ORDER — VALSARTAN 80 MG/1
80 TABLET ORAL 2 TIMES DAILY
Status: CANCELLED | OUTPATIENT
Start: 2020-06-30

## 2020-06-30 RX ORDER — POTASSIUM CHLORIDE 7.45 MG/ML
10 INJECTION INTRAVENOUS
Status: COMPLETED | OUTPATIENT
Start: 2020-06-30 | End: 2020-06-30

## 2020-06-30 RX ORDER — HYDROXYZINE 50 MG/1
50 TABLET, FILM COATED ORAL 4 TIMES DAILY
COMMUNITY
End: 2021-08-25

## 2020-06-30 RX ORDER — FLUOXETINE HYDROCHLORIDE 40 MG/1
40 CAPSULE ORAL DAILY
COMMUNITY
End: 2020-07-03

## 2020-06-30 RX ORDER — METOPROLOL SUCCINATE 50 MG/1
50 TABLET, EXTENDED RELEASE ORAL DAILY
Status: CANCELLED | OUTPATIENT
Start: 2020-06-30

## 2020-06-30 RX ORDER — MORPHINE SULFATE 2 MG/ML
1 INJECTION, SOLUTION INTRAMUSCULAR; INTRAVENOUS EVERY 6 HOURS PRN
Status: DISCONTINUED | OUTPATIENT
Start: 2020-06-30 | End: 2020-07-03 | Stop reason: HOSPADM

## 2020-06-30 RX ORDER — VALSARTAN 80 MG/1
40 TABLET ORAL
Status: DISCONTINUED | OUTPATIENT
Start: 2020-06-30 | End: 2020-07-03 | Stop reason: HOSPADM

## 2020-06-30 RX ADMIN — PROMETHAZINE HYDROCHLORIDE 12.5 MG: 25 INJECTION INTRAMUSCULAR; INTRAVENOUS at 00:09

## 2020-06-30 RX ADMIN — CEFEPIME 2 G: 2 INJECTION, POWDER, FOR SOLUTION INTRAVENOUS at 01:54

## 2020-06-30 RX ADMIN — PROMETHAZINE HYDROCHLORIDE 25 MG: 25 TABLET ORAL at 21:07

## 2020-06-30 RX ADMIN — IOVERSOL 100 ML: 741 INJECTION INTRA-ARTERIAL; INTRAVENOUS at 16:15

## 2020-06-30 RX ADMIN — CEFEPIME 2 G: 2 INJECTION, POWDER, FOR SOLUTION INTRAVENOUS at 09:36

## 2020-06-30 RX ADMIN — SODIUM PHOSPHATE, MONOBASIC, MONOHYDRATE 15 MMOL: 276; 142 INJECTION, SOLUTION INTRAVENOUS at 23:12

## 2020-06-30 RX ADMIN — ENOXAPARIN SODIUM 40 MG: 40 INJECTION SUBCUTANEOUS at 18:11

## 2020-06-30 RX ADMIN — SODIUM CHLORIDE, PRESERVATIVE FREE 10 ML: 5 INJECTION INTRAVENOUS at 09:38

## 2020-06-30 RX ADMIN — ROSUVASTATIN CALCIUM 10 MG: 10 TABLET, FILM COATED ORAL at 20:21

## 2020-06-30 RX ADMIN — POTASSIUM PHOSPHATE, MONOBASIC AND POTASSIUM PHOSPHATE, DIBASIC 15 MMOL: 224; 236 INJECTION, SOLUTION, CONCENTRATE INTRAVENOUS at 04:48

## 2020-06-30 RX ADMIN — METOPROLOL TARTRATE 50 MG: 50 TABLET, FILM COATED ORAL at 20:21

## 2020-06-30 RX ADMIN — SODIUM CHLORIDE, PRESERVATIVE FREE 10 ML: 5 INJECTION INTRAVENOUS at 14:41

## 2020-06-30 RX ADMIN — MORPHINE SULFATE 1 MG: 2 INJECTION, SOLUTION INTRAMUSCULAR; INTRAVENOUS at 16:09

## 2020-06-30 RX ADMIN — MORPHINE SULFATE 1 MG: 2 INJECTION, SOLUTION INTRAMUSCULAR; INTRAVENOUS at 04:47

## 2020-06-30 RX ADMIN — METRONIDAZOLE 500 MG: 500 INJECTION, SOLUTION INTRAVENOUS at 14:41

## 2020-06-30 RX ADMIN — PROMETHAZINE HYDROCHLORIDE 25 MG: 25 TABLET ORAL at 12:33

## 2020-06-30 RX ADMIN — SODIUM CHLORIDE, PRESERVATIVE FREE 10 ML: 5 INJECTION INTRAVENOUS at 20:21

## 2020-06-30 RX ADMIN — SODIUM CHLORIDE, PRESERVATIVE FREE 10 ML: 5 INJECTION INTRAVENOUS at 20:20

## 2020-06-30 RX ADMIN — ASPIRIN 81 MG: 81 TABLET, COATED ORAL at 12:33

## 2020-06-30 RX ADMIN — POTASSIUM CHLORIDE 10 MEQ: 7.46 INJECTION, SOLUTION INTRAVENOUS at 10:30

## 2020-06-30 RX ADMIN — METRONIDAZOLE 500 MG: 500 INJECTION, SOLUTION INTRAVENOUS at 06:02

## 2020-06-30 RX ADMIN — METRONIDAZOLE 500 MG: 500 INJECTION, SOLUTION INTRAVENOUS at 21:07

## 2020-06-30 RX ADMIN — MORPHINE SULFATE 1 MG: 2 INJECTION, SOLUTION INTRAMUSCULAR; INTRAVENOUS at 23:12

## 2020-06-30 RX ADMIN — POTASSIUM CHLORIDE 10 MEQ: 7.46 INJECTION, SOLUTION INTRAVENOUS at 09:37

## 2020-06-30 RX ADMIN — MORPHINE SULFATE 1 MG: 2 INJECTION, SOLUTION INTRAMUSCULAR; INTRAVENOUS at 10:30

## 2020-06-30 RX ADMIN — VALSARTAN 40 MG: 80 TABLET, FILM COATED ORAL at 20:20

## 2020-06-30 RX ADMIN — PROMETHAZINE HYDROCHLORIDE 12.5 MG: 25 INJECTION INTRAMUSCULAR; INTRAVENOUS at 06:48

## 2020-06-30 RX ADMIN — METOPROLOL TARTRATE 25 MG: 25 TABLET, FILM COATED ORAL at 09:35

## 2020-06-30 RX ADMIN — CEFEPIME 2 G: 2 INJECTION, POWDER, FOR SOLUTION INTRAVENOUS at 18:11

## 2020-07-01 LAB
ALBUMIN SERPL-MCNC: 3.12 G/DL (ref 3.5–5.2)
ALBUMIN/GLOB SERPL: 1 G/DL
ALP SERPL-CCNC: 79 U/L (ref 39–117)
ALT SERPL W P-5'-P-CCNC: 12 U/L (ref 1–33)
ANION GAP SERPL CALCULATED.3IONS-SCNC: 15.6 MMOL/L (ref 5–15)
AST SERPL-CCNC: 15 U/L (ref 1–32)
BASOPHILS # BLD AUTO: 0.04 10*3/MM3 (ref 0–0.2)
BASOPHILS NFR BLD AUTO: 0.3 % (ref 0–1.5)
BILIRUB SERPL-MCNC: 0.5 MG/DL (ref 0.2–1.2)
BUN SERPL-MCNC: 10 MG/DL (ref 6–20)
BUN/CREAT SERPL: 17.5 (ref 7–25)
CALCIUM SPEC-SCNC: 8.5 MG/DL (ref 8.6–10.5)
CHLORIDE SERPL-SCNC: 105 MMOL/L (ref 98–107)
CHOLEST SERPL-MCNC: 162 MG/DL (ref 0–200)
CO2 SERPL-SCNC: 16.4 MMOL/L (ref 22–29)
CREAT SERPL-MCNC: 0.57 MG/DL (ref 0.57–1)
CRP SERPL-MCNC: 7.19 MG/DL (ref 0–0.5)
DEPRECATED RDW RBC AUTO: 44.7 FL (ref 37–54)
EOSINOPHIL # BLD AUTO: 0.04 10*3/MM3 (ref 0–0.4)
EOSINOPHIL NFR BLD AUTO: 0.3 % (ref 0.3–6.2)
ERYTHROCYTE [DISTWIDTH] IN BLOOD BY AUTOMATED COUNT: 13.6 % (ref 12.3–15.4)
GFR SERPL CREATININE-BSD FRML MDRD: 114 ML/MIN/1.73
GLOBULIN UR ELPH-MCNC: 3.1 GM/DL
GLUCOSE SERPL-MCNC: 109 MG/DL (ref 65–99)
HCT VFR BLD AUTO: 36.2 % (ref 34–46.6)
HDLC SERPL-MCNC: 31 MG/DL (ref 40–60)
HGB BLD-MCNC: 11.9 G/DL (ref 12–15.9)
IMM GRANULOCYTES # BLD AUTO: 0.2 10*3/MM3 (ref 0–0.05)
IMM GRANULOCYTES NFR BLD AUTO: 1.3 % (ref 0–0.5)
LDLC SERPL CALC-MCNC: 112 MG/DL (ref 0–100)
LDLC/HDLC SERPL: 3.6 {RATIO}
LYMPHOCYTES # BLD AUTO: 2.07 10*3/MM3 (ref 0.7–3.1)
LYMPHOCYTES NFR BLD AUTO: 13.5 % (ref 19.6–45.3)
MAGNESIUM SERPL-MCNC: 1.9 MG/DL (ref 1.6–2.6)
MCH RBC QN AUTO: 29.5 PG (ref 26.6–33)
MCHC RBC AUTO-ENTMCNC: 32.9 G/DL (ref 31.5–35.7)
MCV RBC AUTO: 89.6 FL (ref 79–97)
MONOCYTES # BLD AUTO: 0.96 10*3/MM3 (ref 0.1–0.9)
MONOCYTES NFR BLD AUTO: 6.2 % (ref 5–12)
NEUTROPHILS NFR BLD AUTO: 12.08 10*3/MM3 (ref 1.7–7)
NEUTROPHILS NFR BLD AUTO: 78.4 % (ref 42.7–76)
NRBC BLD AUTO-RTO: 0 /100 WBC (ref 0–0.2)
PHOSPHATE SERPL-MCNC: 2.4 MG/DL (ref 2.5–4.5)
PHOSPHATE SERPL-MCNC: 2.5 MG/DL (ref 2.5–4.5)
PLATELET # BLD AUTO: 371 10*3/MM3 (ref 140–450)
PMV BLD AUTO: 10.4 FL (ref 6–12)
POTASSIUM SERPL-SCNC: 4 MMOL/L (ref 3.5–5.2)
PROT SERPL-MCNC: 6.2 G/DL (ref 6–8.5)
RBC # BLD AUTO: 4.04 10*6/MM3 (ref 3.77–5.28)
SODIUM SERPL-SCNC: 137 MMOL/L (ref 136–145)
TRIGL SERPL-MCNC: 97 MG/DL (ref 0–150)
VLDLC SERPL-MCNC: 19.4 MG/DL
WBC # BLD AUTO: 15.39 10*3/MM3 (ref 3.4–10.8)

## 2020-07-01 PROCEDURE — 25010000002 MORPHINE PER 10 MG: Performed by: INTERNAL MEDICINE

## 2020-07-01 PROCEDURE — 93010 ELECTROCARDIOGRAM REPORT: CPT | Performed by: INTERNAL MEDICINE

## 2020-07-01 PROCEDURE — 80053 COMPREHEN METABOLIC PANEL: CPT | Performed by: INTERNAL MEDICINE

## 2020-07-01 PROCEDURE — 84100 ASSAY OF PHOSPHORUS: CPT | Performed by: INTERNAL MEDICINE

## 2020-07-01 PROCEDURE — 86140 C-REACTIVE PROTEIN: CPT | Performed by: INTERNAL MEDICINE

## 2020-07-01 PROCEDURE — 25010000002 ENOXAPARIN PER 10 MG: Performed by: INTERNAL MEDICINE

## 2020-07-01 PROCEDURE — 80061 LIPID PANEL: CPT | Performed by: SPECIALIST

## 2020-07-01 PROCEDURE — 83735 ASSAY OF MAGNESIUM: CPT | Performed by: INTERNAL MEDICINE

## 2020-07-01 PROCEDURE — 25010000002 CEFTRIAXONE PER 250 MG: Performed by: PHYSICIAN ASSISTANT

## 2020-07-01 PROCEDURE — 85025 COMPLETE CBC W/AUTO DIFF WBC: CPT | Performed by: INTERNAL MEDICINE

## 2020-07-01 PROCEDURE — 99233 SBSQ HOSP IP/OBS HIGH 50: CPT | Performed by: INTERNAL MEDICINE

## 2020-07-01 PROCEDURE — 93005 ELECTROCARDIOGRAM TRACING: CPT | Performed by: NURSE PRACTITIONER

## 2020-07-01 PROCEDURE — 99232 SBSQ HOSP IP/OBS MODERATE 35: CPT | Performed by: SPECIALIST

## 2020-07-01 PROCEDURE — 63710000001 PROMETHAZINE PER 25 MG: Performed by: INTERNAL MEDICINE

## 2020-07-01 PROCEDURE — 25010000002 CEFEPIME PER 500 MG: Performed by: INTERNAL MEDICINE

## 2020-07-01 PROCEDURE — 25010000003 MAGNESIUM SULFATE 4 GM/100ML SOLUTION: Performed by: INTERNAL MEDICINE

## 2020-07-01 RX ORDER — MAGNESIUM SULFATE HEPTAHYDRATE 40 MG/ML
4 INJECTION, SOLUTION INTRAVENOUS ONCE
Status: COMPLETED | OUTPATIENT
Start: 2020-07-01 | End: 2020-07-01

## 2020-07-01 RX ADMIN — SODIUM CHLORIDE, PRESERVATIVE FREE 10 ML: 5 INJECTION INTRAVENOUS at 21:32

## 2020-07-01 RX ADMIN — METRONIDAZOLE 500 MG: 500 INJECTION, SOLUTION INTRAVENOUS at 14:01

## 2020-07-01 RX ADMIN — ASPIRIN 81 MG: 81 TABLET, COATED ORAL at 08:46

## 2020-07-01 RX ADMIN — PROMETHAZINE HYDROCHLORIDE 25 MG: 25 TABLET ORAL at 13:40

## 2020-07-01 RX ADMIN — METRONIDAZOLE 500 MG: 500 INJECTION, SOLUTION INTRAVENOUS at 06:14

## 2020-07-01 RX ADMIN — CEFEPIME 2 G: 2 INJECTION, POWDER, FOR SOLUTION INTRAVENOUS at 02:09

## 2020-07-01 RX ADMIN — SODIUM CHLORIDE 2 G: 900 INJECTION INTRAVENOUS at 13:38

## 2020-07-01 RX ADMIN — SODIUM PHOSPHATE, MONOBASIC, MONOHYDRATE 15 MMOL: 276; 142 INJECTION, SOLUTION INTRAVENOUS at 13:38

## 2020-07-01 RX ADMIN — MORPHINE SULFATE 1 MG: 2 INJECTION, SOLUTION INTRAMUSCULAR; INTRAVENOUS at 12:12

## 2020-07-01 RX ADMIN — ENOXAPARIN SODIUM 40 MG: 40 INJECTION SUBCUTANEOUS at 17:13

## 2020-07-01 RX ADMIN — MAGNESIUM SULFATE HEPTAHYDRATE 4 G: 40 INJECTION, SOLUTION INTRAVENOUS at 04:58

## 2020-07-01 RX ADMIN — MORPHINE SULFATE 1 MG: 2 INJECTION, SOLUTION INTRAMUSCULAR; INTRAVENOUS at 18:15

## 2020-07-01 RX ADMIN — ROSUVASTATIN CALCIUM 10 MG: 10 TABLET, FILM COATED ORAL at 21:32

## 2020-07-01 RX ADMIN — METRONIDAZOLE 500 MG: 500 INJECTION, SOLUTION INTRAVENOUS at 21:32

## 2020-07-01 RX ADMIN — METOPROLOL TARTRATE 50 MG: 50 TABLET, FILM COATED ORAL at 08:46

## 2020-07-01 RX ADMIN — CEFEPIME 2 G: 2 INJECTION, POWDER, FOR SOLUTION INTRAVENOUS at 10:41

## 2020-07-01 RX ADMIN — MORPHINE SULFATE 1 MG: 2 INJECTION, SOLUTION INTRAMUSCULAR; INTRAVENOUS at 06:18

## 2020-07-01 RX ADMIN — SODIUM CHLORIDE, PRESERVATIVE FREE 10 ML: 5 INJECTION INTRAVENOUS at 08:48

## 2020-07-01 RX ADMIN — SODIUM CHLORIDE, PRESERVATIVE FREE 10 ML: 5 INJECTION INTRAVENOUS at 08:49

## 2020-07-01 RX ADMIN — VALSARTAN 40 MG: 80 TABLET, FILM COATED ORAL at 08:46

## 2020-07-01 NOTE — PROGRESS NOTES
PROGRESS NOTE         Patient Identification:  Name:  Rani Martins  Age:  49 y.o.  Sex:  female  :  1970  MRN:  [id number]  Visit Number:  [id number]  Primary Care Provider:  Santi Stewart MD         LOS: 4 days       ----------------------------------------------------------------------------------------------------------------------  Subjective       Chief Complaints:    Fall; Weakness - Generalized; and Diarrhea        Interval History:      Comfortable this morning but on 4 L nasal cannula. CRP showing improvement as well as leukocytosis.  CT of the chest showed bilateral pneumonia which may be bacterial in etiology.  Patient denies any cough or significant shortness of breath.  Low-grade fever reported overnight.      Review of Systems:    Constitutional: no fever, chills and night sweats. No appetite change or unexpected weight change. No fatigue.  Eyes: no eye drainage, itching or redness.  HEENT: no mouth sores, dysphagia or nose bleed.  Respiratory: no for shortness of breath, cough or production of sputum.  Cardiovascular: no chest pain, no palpitations, no orthopnea.  Gastrointestinal: no nausea, vomiting or diarrhea. No abdominal pain, hematemesis or rectal bleeding.  Genitourinary: no dysuria or polyuria.  Hematologic/lymphatic: no lymph node abnormalities, no easy bruising or easy bleeding.  Musculoskeletal: no muscle or joint pain.  Skin: No rash and no itching.  Neurological: no loss of consciousness, no seizure, no headache.  Behavioral/Psych: no depression or suicidal ideation.  Endocrine: no hot flashes.  Immunologic: negative.  ----------------------------------------------------------------------------------------------------------------------      Objective       [facility] Meds:    aspirin 81 mg Oral Daily   cefepime 2 g Intravenous Q8H   enoxaparin 40 mg Subcutaneous Q24H   metoprolol tartrate 50 mg Oral Q12H   metroNIDAZOLE 500 mg Intravenous Q8H   polyethylene  glycol 17 g Oral Daily   rosuvastatin 10 mg Oral Nightly   sodium chloride 10 mL Intravenous Q12H   sodium chloride 10 mL Intravenous Q12H   valsartan 40 mg Oral Q24H        ----------------------------------------------------------------------------------------------------------------------    Vital Signs:  Temp:  [98.2 °F (36.8 °C)-99.5 °F (37.5 °C)] 98.6 °F (37 °C)  Heart Rate:  [] 89  Resp:  [20-28] 20  BP: (111-158)/() 134/96  Mean Arterial Pressure (Non-Invasive) for the past 24 hrs (Last 3 readings):   Noninvasive MAP (mmHg)   07/01/20 1002 109   07/01/20 0902 103   07/01/20 0830 107     SpO2 Percentage    07/01/20 0830 07/01/20 0902 07/01/20 1002   SpO2: 94% 96% 93%     SpO2:  [86 %-98 %] 93 %  on  Flow (L/min):  [2.5-4] 4;   Device (Oxygen Therapy): humidified;nasal cannula    Body mass index is 31.23 kg/m².  Wt Readings from Last 3 Encounters:   07/01/20 74.9 kg (165 lb 3.2 oz)   01/10/19 54.4 kg (120 lb)   10/10/18 58.3 kg (128 lb 9.6 oz)        Intake/Output Summary (Last 24 hours) at 7/1/2020 1126  Last data filed at 7/1/2020 0800  Gross per 24 hour   Intake 2521.29 ml   Output 1200 ml   Net 1321.29 ml     NPO Diet  Diet Regular; GI Soft, [place]  ----------------------------------------------------------------------------------------------------------------------      Physical Exam:    Constitutional:  Well-developed and well-nourished.  No respiratory distress.      HENT:  Head: Normocephalic and atraumatic.  Mouth:  Moist mucous membranes.    Eyes:  Conjunctivae and EOM are normal.  No scleral icterus.  Neck:  Neck supple.  No JVD present.    Cardiovascular:  Normal rate, regular rhythm and normal heart sounds with no murmur. No edema.  Pulmonary/Chest:  No respiratory distress, no wheezes, no crackles, with normal breath sounds and good air movement.  Abdominal: Left lower quadrant tenderness improved.  Soft.  Bowel sounds are normal.  No distension.  No guarding or rebound  tenderness.  Musculoskeletal:  No edema, no tenderness, and no deformity.  No swelling or redness of joints.  Neurological:  Alert and oriented to person, place, and time.  No facial droop.  No slurred speech.   Skin:  Skin is warm and dry.  No rash noted.  No pallor.   Psychiatric:  Normal mood and affect.  Behavior is normal.       ----------------------------------------------------------------------------------------------------------------------  Results from last 7 days   Lab Units 06/30/20 2014 06/30/20  1435 06/30/20  0855  06/27/20  1341   CK TOTAL U/L  --   --   --   --  79   TROPONIN T ng/mL 0.084* 0.125* 0.143*   < > <0.010    < > = values in this interval not displayed.     Results from last 7 days   Lab Units 06/27/20  1110   PROBNP pg/mL 549.7*     Results from last 7 days   Lab Units 07/01/20  0221   CHOLESTEROL mg/dL 162   TRIGLYCERIDES mg/dL 97   HDL CHOL mg/dL 31*   LDL CHOL mg/dL 112*     Results from last 7 days   Lab Units 06/27/20  1133   PH, ARTERIAL pH units 7.420   PO2 ART mm Hg 76.0*   PCO2, ARTERIAL mm Hg 36.9   HCO3 ART mmol/L 23.9     Results from last 7 days   Lab Units 07/01/20  0221 06/30/20  0139 06/29/20  0425  06/27/20  1110   CRP mg/dL 7.19* 8.15* 12.45*   < > 1.31*   LACTATE mmol/L  --   --   --   --  1.5   WBC 10*3/mm3 15.39* 21.78* 21.57*   < > 15.86*   HEMOGLOBIN g/dL 11.9* 12.9 12.6   < > 13.0   HEMATOCRIT % 36.2 39.8 41.5   < > 40.0   MCV fL 89.6 91.3 98.3*   < > 92.0   MCHC g/dL 32.9 32.4 30.4*   < > 32.5   PLATELETS 10*3/mm3 371 383 288   < > 296    < > = values in this interval not displayed.     Results from last 7 days   Lab Units 07/01/20  0221 06/30/20  1833 06/30/20  0139 06/29/20  0425   SODIUM mmol/L 137  --  136 134*   POTASSIUM mmol/L 4.0 4.6 3.5 4.0  4.0   MAGNESIUM mg/dL 1.9  --  2.1 2.4   CHLORIDE mmol/L 105  --  106 105   CO2 mmol/L 16.4*  --  16.5* 12.1*   BUN mg/dL 10  --  7 4*   CREATININE mg/dL 0.57  --  0.59 0.52*   EGFR IF NONAFRICN AM mL/min/1.73  114  --  109 126   CALCIUM mg/dL 8.5*  --  8.6 8.9   GLUCOSE mg/dL 109*  --  133* 121*   ALBUMIN g/dL 3.12*  --  3.36* 3.17*   BILIRUBIN mg/dL 0.5  --  0.7 1.0   ALK PHOS U/L 79  --  96 116   AST (SGOT) U/L 15  --  24 42*   ALT (SGPT) U/L 12  --  18 24   Estimated Creatinine Clearance: 112.9 mL/min (by C-G formula based on SCr of 0.57 mg/dL).  No results found for: AMMONIA    No results found for: HGBA1C, POCGLU  Lab Results   Component Value Date    HGBA1C 5.00 01/29/2018     Lab Results   Component Value Date    TSH 1.020 06/27/2020    FREET4 0.95 01/19/2016       Blood Culture   Date Value Ref Range Status   06/27/2020 No growth at 3 days  Preliminary   06/27/2020 No growth at 3 days  Preliminary     No results found for: URINECX  No results found for: WOUNDCX  No results found for: STOOLCX  No results found for: RESPCX  Pain Management Panel     Pain Management Panel Latest Ref Rng & Units 6/30/2020 6/27/2020    AMPHETAMINES SCREEN, URINE Negative Negative Negative    BARBITURATES SCREEN Negative Negative Negative    BENZODIAZEPINE SCREEN, URINE Negative Positive(A) Positive(A)    BUPRENORPHINEUR Negative Negative Negative    COCAINE SCREEN, URINE Negative Negative Negative    METHADONE SCREEN, URINE Negative Negative Negative            ----------------------------------------------------------------------------------------------------------------------  Imaging Results (Last 24 Hours)     Procedure Component Value Units Date/Time    CT Chest Pulmonary Embolism [[id number]] Collected:  06/30/20 1555     Updated:  06/30/20 1600    Narrative:       EXAMINATION: CT CHEST PULMONARY EMBOLISM-      CLINICAL INDICATION:Hypoxia, tachycardia, fever; A41.9-Sepsis,  unspecified organism; K52.9-Noninfective gastroenteritis and colitis,  unspecified; I95.1-Orthostatic hypotension; S02.2XXA-Fracture of nasal  bones, initial encounter for closed fracture; S00.83XA-Contusion of  other part of head, initial encounter;  F19.10-Other psychoactive  substance abuse, uncomplicated; R19.7-Diarrhea, unspecified      COMPARISON: NONE.     TECHNIQUE: Multiple CT axial images were obtained through the level of  pulmonary arteries, following IV contrast administration per CT PE  protocol.  Volume Rendered 3D or MIP images performed.     Radiation dose reduction techniques were utilized per ALARA protocol.  Automated exposure control was initiated through either or [employer] or  Brev[employer] software packages by  protocol.    DOSE (DLP mGy-cm):        FINDINGS:     Pulmonary arteries: No evidence of pulmonary embolism.  Lungs: Development of bilateral nonenhancing centralized airspace  disease predominantly in centrilobular distribution most consistent with  bilateral bacterial pneumonia. Interlobular septal thickening is noted  consistent with edema.  Heart: Borderline cardiomegaly is noted.  Pericardium: No effusion.  Mediastinum: No masses. No enlarged lymph nodes.  No fluid collections.  Pleura: Interval development of small nonloculated pleural effusions.  Major airways: Clear. No intrinsic mass.  Vasculature: No evidence of aneurysm.  Visualized upper abdomen:The upper abdomen is unremarkable as  visualized.  Other: None.  Bones: No acute bony abnormality.       Impression:       1. Bilateral pneumonia which may be bacterial in etiology.  2. Interstitial thickening with small pleural effusions which may  represent volume overload or cardiogenic edema.  3. No PE identified.     This report was finalized on 6/30/2020 3:58 PM by Dr. Jose Raul Blank MD.             ----------------------------------------------------------------------------------------------------------------------    Assessment/Plan       Assessment/Plan     ASSESSMENT:    1.  Sepsis  2.  Colitis     PLAN:     Comfortable this morning but on 4 L nasal cannula. CRP showing improvement as well as leukocytosis.  CT of the chest showed bilateral pneumonia which may be  bacterial in etiology.  Patient denies any cough or significant shortness of breath.  Low-grade fever reported overnight.      Negative C. difficile toxin PCR and GI panel.  CT of the abdomen and pelvis on 6/27/2020 shows appearance suggestive of left-sided colitis.    Patient is otherwise showing improvement, cefepime was de-escalate it to Rocephin 2 g IV every 24 hours and to continue Flagyl 500 mg IV every 8 hours.     We will continue to follow closely and adjust antibiotic therapy appropriately.  CRP ordered for a.m.      Code Status:   Code Status and Medical Interventions:   Ordered at: 06/27/20 1448     Code Status:    CPR     Medical Interventions (Level of Support Prior to Arrest):    Full       Sneha Aviles PA-C  07/01/20  11:26    Physician Attestation:    I have personally performed a face-to-face evaluation on this patient. I have collected the review of systems and performed my own physical exam. I reviewed the patient's data including history of present illness, past medical history, past surgical history and allergy list. . The assessment and plan documented above are my own after discussing the case in detail with the APC. I have reviewed the laboratory and radiological pertinent results. I have reviewed and edited the note above after discussing the findings with the APC.    Kameron Tay MD  Infectious Diseases  07/01/20  11:26

## 2020-07-01 NOTE — PLAN OF CARE
Problem: Patient Care Overview  Goal: Plan of Care Review  Outcome: Ongoing (interventions implemented as appropriate)  Flowsheets  Taken 7/1/2020 0512  Progress: no change  Taken 7/1/2020 0200  Plan of Care Reviewed With: patient  Note:   Pt has continued to complain of nausea and pain requesting PO phenergan and IV Morphine before they are due. Patient is currently on 4L NC which was increased throughout the night by respiratory. Patient states that she did sleep better last night than previous nights. Patients last troponin continued trending down at 0.084. Patient resting in bed at this time. Call light within reach. Will continue to monitor.

## 2020-07-01 NOTE — PROGRESS NOTES
Discharge Planning Assessment   Farhan     Patient Name: Rani Martins  MRN: [id number]  Today's Date: 7/1/2020    Admit Date: 6/27/2020      Discharge Plan     Row Name 07/01/20 1531       Plan    Plan  Pt was admitted on 6/27/20. SS spoke with Pt. Pt lives with her mother, Rossana Nathan. Pt does not utilize any home health services. Pt would like to return home at discharge and reports that her mother will transport. SS will continue to follow and assist with discharge planning.    Patient/Family in Agreement with Plan  yes              Barb Briscoe

## 2020-07-01 NOTE — PROGRESS NOTES
[facility] HOSPITALIST PROGRESS NOTE     Patient Identification:  Name:  Rani Martins  Age:  49 y.o.  Sex:  female  :  1970  MRN:  [id number]  Visit Number:  [id number]  Primary Care Provider:  Santi Stewart MD    Length of stay:  4    Chief complaint:  49 y.o. old female admitted with Fall; Weakness - Generalized; and Diarrhea          Subjective:    Patient was seen with nursing staff.  Mother was present at bedside.  Patient is lying in bed and stating that she is feels much better today.  Patient states that she is not as sick as she was when she came in.  She is complaining of some chest tightness which she states has started today.  Patient denies any chest tightness at this time.  Patient is complaining of mild lower abdominal pain.  She denies any nausea or vomiting.  Patient states that she is hungry but did not feel like eating much last night.  She is n.p.o. this morning.  Patient has been moving her bowels and denies any diarrhea or blood in the bowel movements.  Patient states that she has been able to get up to the bedside commode and denies any dizziness or lightheadedness.  Patient is complaining of cough but denies any sputum production.  Nursing staff reports no issues.  She is currently n.p.o. for cardiology evaluation.       Current Hospital Meds:    aspirin 81 mg Oral Daily   cefepime 2 g Intravenous Q8H   enoxaparin 40 mg Subcutaneous Q24H   metoprolol tartrate 50 mg Oral Q12H   metroNIDAZOLE 500 mg Intravenous Q8H   polyethylene glycol 17 g Oral Daily   rosuvastatin 10 mg Oral Nightly   sodium chloride 10 mL Intravenous Q12H   sodium chloride 10 mL Intravenous Q12H   valsartan 40 mg Oral Q24H        ----------------------------------------------------------------------------------------------------------------------  Vital Signs:  Temp:  [98.2 °F (36.8 °C)-99.5 °F (37.5 °C)] 98.6 °F (37 °C)  Heart Rate:  [] 89  Resp:  [20-28] 20  BP: (111-158)/() 134/96       06/29/20  0400 06/30/20  0400 07/01/20  0401   Weight: 77.7 kg (171 lb 3.2 oz) 77.7 kg (171 lb 3.2 oz) 74.9 kg (165 lb 3.2 oz)     Body mass index is 31.23 kg/m².    Intake/Output Summary (Last 24 hours) at 7/1/2020 1028  Last data filed at 7/1/2020 0800  Gross per 24 hour   Intake 2765.26 ml   Output 1600 ml   Net 1165.26 ml     NPO Diet  ----------------------------------------------------------------------------------------------------------------------  Physical exam:  Constitutional:  Well-developed and well-nourished.     HENT:  Head:  Normocephalic and atraumatic.  Mouth:  Moist mucous membranes.    Eyes:  Conjunctivae and EOM are normal.  Pupils are equal, round, and reactive to light.   Neck:  Neck supple.  No JVD present.    Cardiovascular:  Regular rate and rhythm. S1+S2. No murmur, rubs or gallops.   Pulmonary/Chest: Few expiratory rhonchi in left basilar area on posterior hemithorax otherwise clear to auscultation bilaterally.  Abdominal:  Soft.  Mild tenderness to palpation in lower quadrants. No viscera palpable.  Bowel sounds audible.   Musculoskeletal: No deformity or joint swelling.   Peripheral vascular: Bilateral dorsalis pedis palpable. No edema.   Neurological:  Alert and oriented to person, place, and time.  Cranial nerves grossly intact. Strength bilaterally symmetrical in upper and lower extremities.   Skin:  Skin is warm and dry. No rash noted. No pallor.   ----------------------------------------------------------------------------------------------------------------------  Tele:    ----------------------------------------------------------------------------------------------------------------------  Results from last 7 days   Lab Units 06/30/20 2014 06/30/20  1435 06/30/20  0855  06/27/20  1341   CK TOTAL U/L  --   --   --   --  79   TROPONIN T ng/mL 0.084* 0.125* 0.143*   < > <0.010    < > = values in this interval not displayed.     Results from last 7 days   Lab Units  07/01/20  0221 06/30/20  0139 06/29/20  0425  06/27/20  1110   CRP mg/dL 7.19* 8.15* 12.45*   < > 1.31*   LACTATE mmol/L  --   --   --   --  1.5   WBC 10*3/mm3 15.39* 21.78* 21.57*   < > 15.86*   HEMOGLOBIN g/dL 11.9* 12.9 12.6   < > 13.0   HEMATOCRIT % 36.2 39.8 41.5   < > 40.0   MCV fL 89.6 91.3 98.3*   < > 92.0   MCHC g/dL 32.9 32.4 30.4*   < > 32.5   PLATELETS 10*3/mm3 371 383 288   < > 296    < > = values in this interval not displayed.     Results from last 7 days   Lab Units 06/27/20  1133   PH, ARTERIAL pH units 7.420   PO2 ART mm Hg 76.0*   PCO2, ARTERIAL mm Hg 36.9   HCO3 ART mmol/L 23.9     Results from last 7 days   Lab Units 07/01/20 0221 06/30/20  1833 06/30/20  0139 06/29/20  0425   SODIUM mmol/L 137  --  136 134*   POTASSIUM mmol/L 4.0 4.6 3.5 4.0  4.0   MAGNESIUM mg/dL 1.9  --  2.1 2.4   CHLORIDE mmol/L 105  --  106 105   CO2 mmol/L 16.4*  --  16.5* 12.1*   BUN mg/dL 10  --  7 4*   CREATININE mg/dL 0.57  --  0.59 0.52*   EGFR IF NONAFRICN AM mL/min/1.73 114  --  109 126   CALCIUM mg/dL 8.5*  --  8.6 8.9   GLUCOSE mg/dL 109*  --  133* 121*   ALBUMIN g/dL 3.12*  --  3.36* 3.17*   BILIRUBIN mg/dL 0.5  --  0.7 1.0   ALK PHOS U/L 79  --  96 116   AST (SGOT) U/L 15  --  24 42*   ALT (SGPT) U/L 12  --  18 24   Estimated Creatinine Clearance: 112.9 mL/min (by C-G formula based on SCr of 0.57 mg/dL).    No results found for: AMMONIA  Results from last 7 days   Lab Units 07/01/20  0221   CHOLESTEROL mg/dL 162   TRIGLYCERIDES mg/dL 97   HDL CHOL mg/dL 31*   LDL CHOL mg/dL 112*     Blood Culture   Date Value Ref Range Status   06/27/2020 No growth at 3 days  Preliminary   06/27/2020 No growth at 3 days  Preliminary     No results found for: URINECX  No results found for: WOUNDCX  No results found for: STOOLCX    I have personally looked at the labs and they are summarized above.  ----------------------------------------------------------------------------------------------------------------------  Imaging  Results (Last 24 Hours)     Procedure Component Value Units Date/Time    CT Chest Pulmonary Embolism [[id number]] Collected:  06/30/20 1555     Updated:  06/30/20 1600    Narrative:       EXAMINATION: CT CHEST PULMONARY EMBOLISM-      CLINICAL INDICATION:Hypoxia, tachycardia, fever; A41.9-Sepsis,  unspecified organism; K52.9-Noninfective gastroenteritis and colitis,  unspecified; I95.1-Orthostatic hypotension; S02.2XXA-Fracture of nasal  bones, initial encounter for closed fracture; S00.83XA-Contusion of  other part of head, initial encounter; F19.10-Other psychoactive  substance abuse, uncomplicated; R19.7-Diarrhea, unspecified      COMPARISON: NONE.     TECHNIQUE: Multiple CT axial images were obtained through the level of  pulmonary arteries, following IV contrast administration per CT PE  protocol.  Volume Rendered 3D or MIP images performed.     Radiation dose reduction techniques were utilized per ALARA protocol.  Automated exposure control was initiated through either or [employer] or  DoseRight software packages by  protocol.    DOSE (DLP mGy-cm):        FINDINGS:     Pulmonary arteries: No evidence of pulmonary embolism.  Lungs: Development of bilateral nonenhancing centralized airspace  disease predominantly in centrilobular distribution most consistent with  bilateral bacterial pneumonia. Interlobular septal thickening is noted  consistent with edema.  Heart: Borderline cardiomegaly is noted.  Pericardium: No effusion.  Mediastinum: No masses. No enlarged lymph nodes.  No fluid collections.  Pleura: Interval development of small nonloculated pleural effusions.  Major airways: Clear. No intrinsic mass.  Vasculature: No evidence of aneurysm.  Visualized upper abdomen:The upper abdomen is unremarkable as  visualized.  Other: None.  Bones: No acute bony abnormality.       Impression:       1. Bilateral pneumonia which may be bacterial in etiology.  2. Interstitial thickening with small pleural  effusions which may  represent volume overload or cardiogenic edema.  3. No PE identified.     This report was finalized on 6/30/2020 3:58 PM by Dr. Jose Raul Blank MD.           ----------------------------------------------------------------------------------------------------------------------  Assessment and Plan:    -Sepsis due to colitis and pneumonia  Improved.  Patient is afebrile and VS stable.  Hypotension and tachycardia resolved.  CRP improving and is down to 7.1 from 8 yesterday.  WBC improving and is down to 15 from 21 yesterday.    Blood cultures have shown no growth so far.    Continue Flagyl, cefepime de-escalate it to Rocephin ID recommendations.    -Acute colitis  Proving.  Patient denies any nausea, vomiting, abdominal pain and tolerating p.o. diet.   GI panel and C. difficile toxin is negative.  Antibiotics as outlined above.   CT scan showed left-sided colitis.    Patient had KUB few loops of air-filled small bowel without significant distention with moderate stool in the colon, bowel regimen resulted in multiple BMs and improvement in KUB    -Bilateral pneumonia  CT scan of the chest showed bilateral pneumonia and no PE.  Continue antibiotics as outlined above.    -NSTEMI  Patient has been having intermittent chest tightness but denies any chest pain at this time.  EKG showed T wave inversions.  Troponins peaked at 0.143 and not trending down.  Cardiogram showed normal LVSF, EF of 65%, no significant valvular abnormality.  Cardiology on board and planning for stress test in a.m.  Continue aspirin, statin, beta-blocker and ARB.    -Sinus bradycardia  Heart rate is improved.  TSH and electrolytes are within normal limits.  Continue to monitor on telemetry.    -Essential hypertension  Blood pressure controlled.  Continue valsartan and metoprolol.  Monitor blood pressure and adjust antihypertensives as needed.    -Dizziness with recent fall and generalized weakness  Patient had hypotension with  orthostasis on admission likely contributing to her dizziness and generalized weakness.  Patient also has UDS showing benzodiazepine and opiates which were not prescribed medications.  History of the head showed no acute intracranial abnormality. CT of facial bones reveals equivocal minimally displaced nasal bone fractures. No fractures noted on imaging of cervical, thoracic and lumbar spine.   Dizziness has improved.  Continue fall precautions per protocol.    -Abnormal UDS  UDS positive for benzodiazepine and opiates.  Patient does not have any prescription for controlled substance.  Patient adamantly denies any substance abuse.    Activity: Up with assist  Nutrition: Cardiac diet  Fluids: None  DVT prophylaxis: Lovenox  GI prophylaxis: PPI    The patient is high risk due to: Sepsis, colitis, pneumonia, NSTEMI, dizziness, hypertension.    I discussed the patient's findings and my recommendations with patient, family and nursing staff.    Porfirio Sanchez MD  07/01/20  10:28

## 2020-07-01 NOTE — PROGRESS NOTES
LOS: 4 days     Name: Rani Martins  Age/Sex: 49 y.o. female  :  1970        PCP: Satni Stewart MD  REF: No ref. provider found    Active Problems:    Sepsis (CMS/[facility])      Reason for follow-up: Abnormal EKG and Elevated troponin     Subjective       Subjective     Jessica Martins is a 49-year-old female with a past medical history significant for anxiety, bipolar disorder, depression and fibromyalgia.  Patient presented to the ER on  due to fall.  She was found to have sepsis secondary to colitis with hypotension and bradycardia.  Cardiology was consulted for abnormal EKG and elevated troponin.  It is a note that patient is somewhat of a poor historian and her mother is at bedside and aided in the history.  The mother states that the patient has been having episodes of weakness due to fibromyalgia and occasionally has falls.  She denies losing consciousness during these times.  The patient denied any dizziness or lightheadedness prior to the falls only weakness.  She denies any recent chest pain, shortness of breath or pedal edema.  Her mother states that she has a personal history of Aramis-Parkinson-White syndrome but no history of premature coronary artery disease in the family.  Patient denies any history of coronary artery disease.  UDS on admission was positive for oxycodone, opiates and benzos. Today patient developed tachycardia and hypertension.  Initial EKG in the ER showed sinus bradycardia with no ischemic changes.  Repeat EKG today shows sinus tachycardia with diffuse T wave inversion consistent with possible ischemia.  She denies smoking.    Interval History: Patient reports she is feeling some better today. Troponin trending down. She denies any chest pain. Blood pressure and heart rate improved.       Vital Signs  Temp:  [98.2 °F (36.8 °C)-99.5 °F (37.5 °C)] 98.6 °F (37 °C)  Heart Rate:  [] 89  Resp:  [20-28] 20  BP: (111-158)/() 134/96  Vital Signs (last 72 hrs)        06/28 0700  -  06/29 0659 06/29 0700 - 06/30 0659 06/30 0700 - 07/01 0659 07/01 0700 - 07/01 1059   Most Recent    Temp (°F) 98.4 -  98.7    98.5 -  100.8    98.2 -  99.5      98.6     98.6 (37)    Heart Rate 80 -  120    104 -  130    85 -  130    89 -  99     89    Resp 18 -  20    13 -  36    24 -  28      20     20    /77 -  182/94    129/76 -  165/112    111/82 -  158/58    128/82 -  140/95     134/96    SpO2 (%) 86 -  100    89 -  100    86 -  98    92 -  96     93        Body mass index is 31.23 kg/m².    Intake/Output Summary (Last 24 hours) at 7/1/2020 1059  Last data filed at 7/1/2020 0800  Gross per 24 hour   Intake 2765.26 ml   Output 1600 ml   Net 1165.26 ml     Objective    Objective       Physical Exam:     General Appearance:    Alert, cooperative, in no acute distress   Head:    Normocephalic, without obvious abnormality, atraumatic   Eyes:            Conjunctivae and sclerae normal, no   icterus, no pallor, corneas clear.   Neck:   No adenopathy, supple, trachea midline, no thyromegaly, no   carotid bruit, no JVD   Lungs:     Clear to auscultation,respirations regular, even and                  unlabored    Heart:    Regular rhythm and normal rate, normal S1 and S2, no            murmur, no gallop, no rub, no click   Chest Wall:    No abnormalities observed   Abdomen:     Normal bowel sounds, no masses, no organomegaly, soft        non-tender, non-distended, no guarding, no rebound                tenderness   Extremities:   Moves all extremities well, no edema, no cyanosis, no             redness   Pulses:   Pulses palpable and equal bilaterally   Skin:   No bleeding, bruising or rash       Neurologic:   Alert and oriented    I have seen and performed a physical examination today.     Results review       Results Review:   Results from last 7 days   Lab Units 07/01/20  0221 06/30/20  0139 06/29/20  0425 06/28/20  0626 06/27/20  1110   WBC 10*3/mm3 15.39* 21.78* 21.57* 17.24* 15.86*    HEMOGLOBIN g/dL 11.9* 12.9 12.6 12.5 13.0   PLATELETS 10*3/mm3 371 383 288 264 296     Results from last 7 days   Lab Units 07/01/20  0221 06/30/20  1833 06/30/20  0139 06/29/20  0425 06/28/20  1715 06/28/20  0626 06/27/20  1110   SODIUM mmol/L 137  --  136 134*  --  141 133*   POTASSIUM mmol/L 4.0 4.6 3.5 4.0  4.0 3.4* 3.5 4.3   CHLORIDE mmol/L 105  --  106 105  --  109* 100   CO2 mmol/L 16.4*  --  16.5* 12.1*  --  18.0* 22.4   BUN mg/dL 10  --  7 4*  --  4* 5*   CREATININE mg/dL 0.57  --  0.59 0.52*  --  0.71 0.80   CALCIUM mg/dL 8.5*  --  8.6 8.9  --  8.4* 9.3   GLUCOSE mg/dL 109*  --  133* 121*  --  110* 115*   ALT (SGPT) U/L 12  --  18 24  --  25 32   AST (SGOT) U/L 15  --  24 42*  --  22 35*     Results from last 7 days   Lab Units 06/30/20 2014 06/30/20  1435 06/30/20  0855 06/27/20  1952 06/27/20  1341   CK TOTAL U/L  --   --   --   --  79   TROPONIN T ng/mL 0.084* 0.125* 0.143* <0.010 <0.010     No results found for: INR  Lab Results   Component Value Date    MG 1.9 07/01/2020    MG 2.1 06/30/2020    MG 2.4 06/29/2020     Lab Results   Component Value Date    TSH 1.020 06/27/2020    TRIG 97 07/01/2020    HDL 31 (L) 07/01/2020     (H) 07/01/2020      Imaging Results (Last 48 Hours)     Procedure Component Value Units Date/Time    CT Chest Pulmonary Embolism [[id number]] Collected:  06/30/20 1555     Updated:  06/30/20 1600    Narrative:       EXAMINATION: CT CHEST PULMONARY EMBOLISM-      CLINICAL INDICATION:Hypoxia, tachycardia, fever; A41.9-Sepsis,  unspecified organism; K52.9-Noninfective gastroenteritis and colitis,  unspecified; I95.1-Orthostatic hypotension; S02.2XXA-Fracture of nasal  bones, initial encounter for closed fracture; S00.83XA-Contusion of  other part of head, initial encounter; F19.10-Other psychoactive  substance abuse, uncomplicated; R19.7-Diarrhea, unspecified      COMPARISON: NONE.     TECHNIQUE: Multiple CT axial images were obtained through the level of  pulmonary  arteries, following IV contrast administration per CT PE  protocol.  Volume Rendered 3D or MIP images performed.     Radiation dose reduction techniques were utilized per ALARA protocol.  Automated exposure control was initiated through either or [employer] or  [employer] software packages by  protocol.    DOSE (DLP mGy-cm):        FINDINGS:     Pulmonary arteries: No evidence of pulmonary embolism.  Lungs: Development of bilateral nonenhancing centralized airspace  disease predominantly in centrilobular distribution most consistent with  bilateral bacterial pneumonia. Interlobular septal thickening is noted  consistent with edema.  Heart: Borderline cardiomegaly is noted.  Pericardium: No effusion.  Mediastinum: No masses. No enlarged lymph nodes.  No fluid collections.  Pleura: Interval development of small nonloculated pleural effusions.  Major airways: Clear. No intrinsic mass.  Vasculature: No evidence of aneurysm.  Visualized upper abdomen:The upper abdomen is unremarkable as  visualized.  Other: None.  Bones: No acute bony abnormality.       Impression:       1. Bilateral pneumonia which may be bacterial in etiology.  2. Interstitial thickening with small pleural effusions which may  represent volume overload or cardiogenic edema.  3. No PE identified.     This report was finalized on 6/30/2020 3:58 PM by Dr. Jose Raul Blank MD.       XR Abdomen KUB [[id number]] Collected:  06/29/20 1533     Updated:  06/29/20 1536    Narrative:       EXAMINATION: XR ABDOMEN KUB-      TECHNIQUE: Single KUB     CLINICAL INDICATION:     Nausea, abdominal pain; A41.9-Sepsis,  unspecified organism; K52.9-Noninfective gastroenteritis and colitis,  unspecified; I95.1-Orthostatic hypotension; S02.2XXA-Fracture of nasal  bones, initial encounter for closed fracture; S00.83XA-Contusion of  other part of head, initial encounter; F19.10-Other psychoactive  substance abuse, uncomplicated; R19.7-Diarrhea, unspecified       COMPARISON:    06/20/2020     FINDINGS:    There are no calcifications projecting over the kidneys or along the  expected course of the ureters.  Bowel gas pattern is nonspecific and nonobstructive in appearance.   Scattered fecal material seen throughout colon.  The bony structures are unremarkable.  No definite radiographic evidence of soft tissue mass.          Impression:          Unremarkable appearance of bowel gas pattern. No ileus or obstruction  suggested on radiograph.     This report was finalized on 6/29/2020 3:34 PM by Dr. Jose Raul Blank MD.           No results found for: BNP      ECG      Echo   Results for orders placed during the hospital encounter of 06/27/20   Adult Transthoracic Echo Complete W/ Cont if Necessary Per Protocol    Narrative · Estimated EF = 65%.  · Left ventricular systolic function is normal.  · Trace mitral regurgitation  · No significant valvular abnormality         I reviewed the patient's new clinical results.    Telemetry: NSR 80-90 bpm        Medication Review:     aspirin 81 mg Oral Daily   cefepime 2 g Intravenous Q8H   enoxaparin 40 mg Subcutaneous Q24H   metoprolol tartrate 50 mg Oral Q12H   metroNIDAZOLE 500 mg Intravenous Q8H   polyethylene glycol 17 g Oral Daily   rosuvastatin 10 mg Oral Nightly   sodium chloride 10 mL Intravenous Q12H   sodium chloride 10 mL Intravenous Q12H   valsartan 40 mg Oral Q24H            Assessment      Assessment:  1. Elevated troponin, initial troponin negative trending up to 0.143, now trending down, EKG tracing reviewed and shows ischemic changes   2. Sepsis secondary to colitis, improving   3. Recent falls with generalized weakness  4. History of severe bipolar disease  5. Positive UDS    Plan     Recommendations:  1. Her troponin is trending down she continues to have no chest pain EKG still with significant T wave inversion we will try to proceed with cardiac catheterization once symptoms of symptoms and signs of sepsis  improving  2. Continue current management anti-antibiotics as per medicine service    I discussed the patients findings and my recommendations with patient and family      Coni GABE Espana, acting as scribe for Dr. Ludmila Murray MD [facility]  07/01/20  10:59  I, Ludmila Murray MD, [facility], performed the services described in this documentation as documented by the above-named individual under my supervision and made necessary changes in the note is both accurate and complete  Please note that portions of this note were completed with a voice recognition program.

## 2020-07-01 NOTE — PLAN OF CARE
Problem: Patient Care Overview  Goal: Plan of Care Review  Note:   Patient has continued to complain of nausea and lower abdominal pain. Requesting PRN phenergan and morphine frequently. Patient is eating and drinking well. Diet changed to GI soft bland. Tolerating well. Cardiology plans to do heart cath in the morning. So she will be held npo after midnight. Mother has been at bedside since beginning of visiting hours. She has remained on 4LNC humified through out the shift. She has been very pleasant. Will continue to monitor.      Problem: Pain, Chronic (Adult)  Goal: Identify Related Risk Factors and Signs and Symptoms  Outcome: Ongoing (interventions implemented as appropriate)  Goal: Acceptable Pain/Comfort Level and Functional Ability  Outcome: Ongoing (interventions implemented as appropriate)     Problem: Fall Risk (Adult)  Goal: Identify Related Risk Factors and Signs and Symptoms  Outcome: Ongoing (interventions implemented as appropriate)     Problem: Patient Care Overview  Goal: Plan of Care Review  7/1/2020 1448 by Shanti Cordova, RN  Outcome: Ongoing (interventions implemented as appropriate)  7/1/2020 1442 by Shanti Cordova, RN  Note:   Patient has continued to complain of nausea and lower abdominal pain. Requesting PRN phenergan and morphine frequently. Patient is eating and drinking well. Diet changed to GI soft bland. Tolerating well. Cardiology plans to do heart cath in the morning. So she will be held npo after midnight. Mother has been at bedside since beginning of visiting hours. She has remained on 4LNC humified through out the shift. She has been very pleasant. Will continue to monitor.   Goal: Individualization and Mutuality  Outcome: Ongoing (interventions implemented as appropriate)  Goal: Discharge Needs Assessment  Outcome: Ongoing (interventions implemented as appropriate)  Goal: Interprofessional Rounds/Family Conf  Outcome: Ongoing (interventions implemented as appropriate)      Problem: Sepsis/Septic Shock (Adult)  Goal: Signs and Symptoms of Listed Potential Problems Will be Absent, Minimized or Managed (Sepsis/Septic Shock)  Outcome: Ongoing (interventions implemented as appropriate)

## 2020-07-02 PROBLEM — R79.89 ELEVATED TROPONIN: Status: ACTIVE | Noted: 2020-06-27

## 2020-07-02 PROBLEM — R77.8 ELEVATED TROPONIN: Status: ACTIVE | Noted: 2020-06-27

## 2020-07-02 PROBLEM — I21.4 NSTEMI, INITIAL EPISODE OF CARE: Status: ACTIVE | Noted: 2020-06-27

## 2020-07-02 LAB
ANION GAP SERPL CALCULATED.3IONS-SCNC: 10.6 MMOL/L (ref 5–15)
BACTERIA SPEC AEROBE CULT: NORMAL
BACTERIA SPEC AEROBE CULT: NORMAL
BASOPHILS # BLD AUTO: 0.06 10*3/MM3 (ref 0–0.2)
BASOPHILS NFR BLD AUTO: 0.5 % (ref 0–1.5)
BUN SERPL-MCNC: 12 MG/DL (ref 6–20)
BUN/CREAT SERPL: 22.2 (ref 7–25)
CALCIUM SPEC-SCNC: 7.9 MG/DL (ref 8.6–10.5)
CHLORIDE SERPL-SCNC: 110 MMOL/L (ref 98–107)
CO2 SERPL-SCNC: 19.4 MMOL/L (ref 22–29)
CREAT SERPL-MCNC: 0.54 MG/DL (ref 0.57–1)
CRP SERPL-MCNC: 6.66 MG/DL (ref 0–0.5)
DEPRECATED RDW RBC AUTO: 47.4 FL (ref 37–54)
EOSINOPHIL # BLD AUTO: 0.14 10*3/MM3 (ref 0–0.4)
EOSINOPHIL NFR BLD AUTO: 1.2 % (ref 0.3–6.2)
ERYTHROCYTE [DISTWIDTH] IN BLOOD BY AUTOMATED COUNT: 13.8 % (ref 12.3–15.4)
GFR SERPL CREATININE-BSD FRML MDRD: 121 ML/MIN/1.73
GLUCOSE SERPL-MCNC: 100 MG/DL (ref 65–99)
HCT VFR BLD AUTO: 34.7 % (ref 34–46.6)
HGB BLD-MCNC: 11.1 G/DL (ref 12–15.9)
IMM GRANULOCYTES # BLD AUTO: 0.25 10*3/MM3 (ref 0–0.05)
IMM GRANULOCYTES NFR BLD AUTO: 2.1 % (ref 0–0.5)
LYMPHOCYTES # BLD AUTO: 1.93 10*3/MM3 (ref 0.7–3.1)
LYMPHOCYTES NFR BLD AUTO: 16 % (ref 19.6–45.3)
MAGNESIUM SERPL-MCNC: 2.4 MG/DL (ref 1.6–2.6)
MCH RBC QN AUTO: 29.8 PG (ref 26.6–33)
MCHC RBC AUTO-ENTMCNC: 32 G/DL (ref 31.5–35.7)
MCV RBC AUTO: 93.3 FL (ref 79–97)
MONOCYTES # BLD AUTO: 1.15 10*3/MM3 (ref 0.1–0.9)
MONOCYTES NFR BLD AUTO: 9.5 % (ref 5–12)
NEUTROPHILS NFR BLD AUTO: 70.7 % (ref 42.7–76)
NEUTROPHILS NFR BLD AUTO: 8.55 10*3/MM3 (ref 1.7–7)
NRBC BLD AUTO-RTO: 0 /100 WBC (ref 0–0.2)
PHOSPHATE SERPL-MCNC: 2 MG/DL (ref 2.5–4.5)
PHOSPHATE SERPL-MCNC: 2.3 MG/DL (ref 2.5–4.5)
PLATELET # BLD AUTO: 319 10*3/MM3 (ref 140–450)
PMV BLD AUTO: 10.7 FL (ref 6–12)
POTASSIUM SERPL-SCNC: 3.8 MMOL/L (ref 3.5–5.2)
RBC # BLD AUTO: 3.72 10*6/MM3 (ref 3.77–5.28)
SARS-COV-2 RDRP RESP QL NAA+PROBE: NOT DETECTED
SODIUM SERPL-SCNC: 140 MMOL/L (ref 136–145)
TROPONIN T SERPL-MCNC: 0.11 NG/ML (ref 0–0.03)
WBC # BLD AUTO: 12.08 10*3/MM3 (ref 3.4–10.8)

## 2020-07-02 PROCEDURE — 99232 SBSQ HOSP IP/OBS MODERATE 35: CPT | Performed by: INTERNAL MEDICINE

## 2020-07-02 PROCEDURE — 87635 SARS-COV-2 COVID-19 AMP PRB: CPT | Performed by: INTERNAL MEDICINE

## 2020-07-02 PROCEDURE — 84100 ASSAY OF PHOSPHORUS: CPT | Performed by: INTERNAL MEDICINE

## 2020-07-02 PROCEDURE — 86140 C-REACTIVE PROTEIN: CPT | Performed by: INTERNAL MEDICINE

## 2020-07-02 PROCEDURE — 83735 ASSAY OF MAGNESIUM: CPT | Performed by: INTERNAL MEDICINE

## 2020-07-02 PROCEDURE — 25010000002 MORPHINE PER 10 MG: Performed by: INTERNAL MEDICINE

## 2020-07-02 PROCEDURE — 85025 COMPLETE CBC W/AUTO DIFF WBC: CPT | Performed by: INTERNAL MEDICINE

## 2020-07-02 PROCEDURE — 84484 ASSAY OF TROPONIN QUANT: CPT | Performed by: NURSE PRACTITIONER

## 2020-07-02 PROCEDURE — 99232 SBSQ HOSP IP/OBS MODERATE 35: CPT | Performed by: SPECIALIST

## 2020-07-02 PROCEDURE — 93005 ELECTROCARDIOGRAM TRACING: CPT | Performed by: NURSE PRACTITIONER

## 2020-07-02 PROCEDURE — 93010 ELECTROCARDIOGRAM REPORT: CPT | Performed by: INTERNAL MEDICINE

## 2020-07-02 PROCEDURE — C9803 HOPD COVID-19 SPEC COLLECT: HCPCS | Performed by: INTERNAL MEDICINE

## 2020-07-02 PROCEDURE — 80048 BASIC METABOLIC PNL TOTAL CA: CPT | Performed by: INTERNAL MEDICINE

## 2020-07-02 PROCEDURE — 25010000002 ENOXAPARIN PER 10 MG: Performed by: INTERNAL MEDICINE

## 2020-07-02 RX ADMIN — POTASSIUM PHOSPHATE, MONOBASIC AND POTASSIUM PHOSPHATE, DIBASIC 15 MMOL: 224; 236 INJECTION, SOLUTION, CONCENTRATE INTRAVENOUS at 07:33

## 2020-07-02 RX ADMIN — MORPHINE SULFATE 1 MG: 2 INJECTION, SOLUTION INTRAMUSCULAR; INTRAVENOUS at 07:33

## 2020-07-02 RX ADMIN — SODIUM CHLORIDE, PRESERVATIVE FREE 10 ML: 5 INJECTION INTRAVENOUS at 20:11

## 2020-07-02 RX ADMIN — POTASSIUM PHOSPHATE, MONOBASIC AND POTASSIUM PHOSPHATE, DIBASIC 15 MMOL: 224; 236 INJECTION, SOLUTION, CONCENTRATE INTRAVENOUS at 20:10

## 2020-07-02 RX ADMIN — VALSARTAN 40 MG: 80 TABLET, FILM COATED ORAL at 08:24

## 2020-07-02 RX ADMIN — ROSUVASTATIN CALCIUM 10 MG: 10 TABLET, FILM COATED ORAL at 20:10

## 2020-07-02 RX ADMIN — MORPHINE SULFATE 1 MG: 2 INJECTION, SOLUTION INTRAMUSCULAR; INTRAVENOUS at 20:10

## 2020-07-02 RX ADMIN — MORPHINE SULFATE 1 MG: 2 INJECTION, SOLUTION INTRAMUSCULAR; INTRAVENOUS at 01:11

## 2020-07-02 RX ADMIN — METOPROLOL TARTRATE 50 MG: 50 TABLET, FILM COATED ORAL at 08:24

## 2020-07-02 RX ADMIN — ENOXAPARIN SODIUM 40 MG: 40 INJECTION SUBCUTANEOUS at 16:33

## 2020-07-02 RX ADMIN — METOPROLOL TARTRATE 50 MG: 50 TABLET, FILM COATED ORAL at 20:09

## 2020-07-02 RX ADMIN — MORPHINE SULFATE 1 MG: 2 INJECTION, SOLUTION INTRAMUSCULAR; INTRAVENOUS at 13:54

## 2020-07-02 RX ADMIN — METRONIDAZOLE 500 MG: 500 INJECTION, SOLUTION INTRAVENOUS at 06:21

## 2020-07-02 RX ADMIN — SODIUM CHLORIDE, PRESERVATIVE FREE 10 ML: 5 INJECTION INTRAVENOUS at 08:26

## 2020-07-02 NOTE — PROGRESS NOTES
[facility] HOSPITALIST PROGRESS NOTE     Patient Identification:  Name:  Rani Martins  Age:  49 y.o.  Sex:  female  :  1970  MRN:  [id number]  Visit Number:  [id number]  Primary Care Provider:  Santi Stewart MD    Length of stay:  5    Chief complaint:  49 y.o. old female admitted with Fall; Weakness - Generalized; and Diarrhea          Subjective:    Patient was seen with nursing staff.  Mother was present at bedside.  Patient is lying comfortably in bed.  She states that she is tired.  Patient denies any nausea or vomiting.  She denies any further chest tightness.  Patient is n.p.o. for ischemia evaluation.  No issues reported by the nursing staff.       Current Hospital Meds:    aspirin 81 mg Oral Daily   cefTRIAXone 2 g Intravenous Q24H   enoxaparin 40 mg Subcutaneous Q24H   metoprolol tartrate 50 mg Oral Q12H   metroNIDAZOLE 500 mg Intravenous Q8H   polyethylene glycol 17 g Oral Daily   potassium phosphate 15 mmol Intravenous Once   rosuvastatin 10 mg Oral Nightly   sodium chloride 10 mL Intravenous Q12H   sodium chloride 10 mL Intravenous Q12H   valsartan 40 mg Oral Q24H        ----------------------------------------------------------------------------------------------------------------------  Vital Signs:  Temp:  [97.5 °F (36.4 °C)-99.6 °F (37.6 °C)] 97.5 °F (36.4 °C)  Heart Rate:  [] 80  Resp:  [16-18] 18  BP: (109-149)/() 131/91      20  0400 20  0401 20  040   Weight: 77.7 kg (171 lb 3.2 oz) 74.9 kg (165 lb 3.2 oz) 74.9 kg (165 lb 1.6 oz)     Body mass index is 31.21 kg/m².    Intake/Output Summary (Last 24 hours) at 2020 1023  Last data filed at 2020 0800  Gross per 24 hour   Intake 1073.05 ml   Output 0 ml   Net 1073.05 ml     NPO Diet  ----------------------------------------------------------------------------------------------------------------------  Physical exam:  Constitutional:  Well-developed and well-nourished.     HENT:  Head:   Normocephalic and atraumatic.  Mouth:  Moist mucous membranes.    Eyes:  Conjunctivae and EOM are normal.  Pupils are equal, round, and reactive to light.   Neck:  Neck supple.  No JVD present.    Cardiovascular:  Regular rate and rhythm. S1+S2. No murmur, rubs or gallops.   Pulmonary/Chest: Few expiratory rhonchi in left basilar area on posterior hemithorax otherwise clear to auscultation bilaterally.  Abdominal:  Soft.  Mild tenderness to palpation in lower quadrants. No viscera palpable.  Bowel sounds audible.   Musculoskeletal: No deformity or joint swelling.   Peripheral vascular: Bilateral dorsalis pedis palpable. No edema.   Neurological:  Alert and oriented to person, place, and time.  Cranial nerves grossly intact. Strength bilaterally symmetrical in upper and lower extremities.   Skin:  Skin is warm and dry. No rash noted. No pallor.   ----------------------------------------------------------------------------------------------------------------------  Tele:    ----------------------------------------------------------------------------------------------------------------------  Results from last 7 days   Lab Units 06/30/20 2014 06/30/20  1435 06/30/20  0855  06/27/20  1341   CK TOTAL U/L  --   --   --   --  79   TROPONIN T ng/mL 0.084* 0.125* 0.143*   < > <0.010    < > = values in this interval not displayed.     Results from last 7 days   Lab Units 07/02/20  0106 07/01/20  0221 06/30/20  0139  06/27/20  1110   CRP mg/dL 6.66* 7.19* 8.15*   < > 1.31*   LACTATE mmol/L  --   --   --   --  1.5   WBC 10*3/mm3 12.08* 15.39* 21.78*   < > 15.86*   HEMOGLOBIN g/dL 11.1* 11.9* 12.9   < > 13.0   HEMATOCRIT % 34.7 36.2 39.8   < > 40.0   MCV fL 93.3 89.6 91.3   < > 92.0   MCHC g/dL 32.0 32.9 32.4   < > 32.5   PLATELETS 10*3/mm3 319 371 383   < > 296    < > = values in this interval not displayed.     Results from last 7 days   Lab Units 06/27/20  1133   PH, ARTERIAL pH units 7.420   PO2 ART mm Hg 76.0*   PCO2,  ARTERIAL mm Hg 36.9   HCO3 ART mmol/L 23.9     Results from last 7 days   Lab Units 07/02/20  0106 07/01/20  0221 06/30/20  1833 06/30/20  0139 06/29/20  0425   SODIUM mmol/L 140 137  --  136 134*   POTASSIUM mmol/L 3.8 4.0 4.6 3.5 4.0  4.0   MAGNESIUM mg/dL 2.4 1.9  --  2.1 2.4   CHLORIDE mmol/L 110* 105  --  106 105   CO2 mmol/L 19.4* 16.4*  --  16.5* 12.1*   BUN mg/dL 12 10  --  7 4*   CREATININE mg/dL 0.54* 0.57  --  0.59 0.52*   EGFR IF NONAFRICN AM mL/min/1.73 121 114  --  109 126   CALCIUM mg/dL 7.9* 8.5*  --  8.6 8.9   GLUCOSE mg/dL 100* 109*  --  133* 121*   ALBUMIN g/dL  --  3.12*  --  3.36* 3.17*   BILIRUBIN mg/dL  --  0.5  --  0.7 1.0   ALK PHOS U/L  --  79  --  96 116   AST (SGOT) U/L  --  15  --  24 42*   ALT (SGPT) U/L  --  12  --  18 24   Estimated Creatinine Clearance: 119.1 mL/min (A) (by C-G formula based on SCr of 0.54 mg/dL (L)).    No results found for: AMMONIA  Results from last 7 days   Lab Units 07/01/20 0221   CHOLESTEROL mg/dL 162   TRIGLYCERIDES mg/dL 97   HDL CHOL mg/dL 31*   LDL CHOL mg/dL 112*     Blood Culture   Date Value Ref Range Status   06/27/2020 No growth at 3 days  Preliminary   06/27/2020 No growth at 3 days  Preliminary     No results found for: URINECX  No results found for: WOUNDCX  No results found for: STOOLCX    I have personally looked at the labs and they are summarized above.  ----------------------------------------------------------------------------------------------------------------------  Imaging Results (Last 24 Hours)     ** No results found for the last 24 hours. **        ----------------------------------------------------------------------------------------------------------------------  Assessment and Plan:    -Sepsis due to colitis and pneumonia  Improved.  Patient remains afebrile with stable vital signs.    WBC and CRP continues to improve.  No growth on blood cultures.  Continue Rocephin and Flagyl per ID recommendations.      -Acute  colitis  Improved.  Patient denies any nausea, vomiting, abdominal pain and tolerating p.o. diet.  GI panel and C. difficile toxin is negative.  Antibiotics as outlined above.   CT scan showed left-sided colitis.      -Bilateral pneumonia  CT scan of the chest showed bilateral pneumonia and no PE.  Continue antibiotics as outlined above.  Patient denies any cough or sputum production.    -NSTEMI  Patient has been chest pain/tightness free today.  Complaining of intermittent chest tightness.  EKG showed T wave inversions.  Troponins peaked at 0.143 and not trending down.  Cardiogram showed normal LVSF, EF of 65%, no significant valvular abnormality.  Cardiology on board and planning for ischemia evaluation today.  Continue aspirin, statin, beta-blocker and ARB.  Will consider increasing beta-blocker given heart rate has been staying around .    -Sinus bradycardia  Resolved.  TSH and electrolytes are within normal limits.  Continue to monitor on telemetry.    -Essential hypertension  Blood pressure controlled.  Continue valsartan and metoprolol.  Monitor blood pressure and adjust antihypertensives as needed.    -Dizziness with recent fall and generalized weakness  Denies any further dizziness.    Patient had hypotension with orthostasis on admission likely contributing to her dizziness and generalized weakness.  Patient also has UDS showing benzodiazepine and opiates which were not prescribed medications.  History of the head showed no acute intracranial abnormality. CT of facial bones reveals equivocal minimally displaced nasal bone fractures. No fractures noted on imaging of cervical, thoracic and lumbar spine.   Dizziness has improved.  Continue fall precautions per protocol.    -Abnormal UDS  UDS positive for benzodiazepine and opiates.  Patient does not have any prescription for controlled substance.  Patient adamantly denies any substance abuse.    Activity: Up with assist  Nutrition: Cardiac diet  Fluids:  None  DVT prophylaxis: Lovenox  GI prophylaxis: PPI    The patient is high risk due to: Sepsis, colitis, pneumonia, NSTEMI, dizziness, hypertension.    I discussed the patient's findings and my recommendations with patient, family and nursing staff.    Porfirio Sanchez MD  07/02/20  10:23

## 2020-07-02 NOTE — PROGRESS NOTES
LOS: 5 days     Name: Rani Martins  Age/Sex: 49 y.o. female  :  1970        PCP: Santi Stewart MD  REF: No ref. provider found    Active Problems:    Elevated troponin    NSTEMI, initial episode of care (CMS/[facility])    Sepsis (CMS/[facility])      Reason for follow-up: Abnormal EKG and elevated troponin    Subjective       Subjective     Jessica Mratins is a 49-year-old female with a past medical history significant for anxiety, bipolar disorder, depression and fibromyalgia.  Patient presented to the ER on  due to fall.  She was found to have sepsis secondary to colitis with hypotension and bradycardia.  Cardiology was consulted for abnormal EKG and elevated troponin.  It is a note that patient is somewhat of a poor historian and her mother is at bedside and aided in the history.  The mother states that the patient has been having episodes of weakness due to fibromyalgia and occasionally has falls.  She denies losing consciousness during these times.  The patient denied any dizziness or lightheadedness prior to the falls only weakness.  She denies any recent chest pain, shortness of breath or pedal edema.  Her mother states that she has a personal history of Aramis-Parkinson-White syndrome but no history of premature coronary artery disease in the family.  Patient denies any history of coronary artery disease.  UDS on admission was positive for oxycodone, opiates and benzos. Today patient developed tachycardia and hypertension.  Initial EKG in the ER showed sinus bradycardia with no ischemic changes.  Repeat EKG today shows sinus tachycardia with diffuse T wave inversion consistent with possible ischemia.  She denies smoking.    Interval History: Patient denies any chest pain.  White blood cell count improving.  Heart rate and blood pressure controlled.  Patient scheduled for left heart catheterization tomorrow.      Vital Signs  Temp:  [97.5 °F (36.4 °C)-99.6 °F (37.6 °C)] 98.8 °F (37.1 °C)  Heart Rate:   [] 84  Resp:  [16-18] 18  BP: (109-149)/(75-99) 131/86     Vital Signs (last 72 hrs)       06/29 0700  -  06/30 0659 06/30 0700  -  07/01 0659 07/01 0700  -  07/02 0659 07/02 0700  -  07/02 1200   Most Recent    Temp (°F) 98.5 -  100.8    98.2 -  99.5    98.4 -  99.6    97.5 -  98.8     98.8 (37.1)    Heart Rate 104 -  130    85 -  130    77 -  112    72 -  96     84    Resp 13 -  36    24 -  28    16 -  20      18     18    /76 -  165/112    111/82 -  158/58    109/85 -  149/99    127/80 -  143/91     131/86    SpO2 (%) 89 -  100    86 -  98    91 -  100    99 -  100     99        Body mass index is 31.21 kg/m².    Intake/Output Summary (Last 24 hours) at 7/2/2020 1200  Last data filed at 7/2/2020 1147  Gross per 24 hour   Intake 1115.85 ml   Output 0 ml   Net 1115.85 ml     Objective    Objective       Physical Exam:     General Appearance:    Alert, cooperative, in no acute distress   Head:    Normocephalic, without obvious abnormality, atraumatic   Eyes:            Conjunctivae and sclerae normal, no   icterus, no pallor, corneas clear.   Neck:   No adenopathy, supple, trachea midline, no thyromegaly, no   carotid bruit, no JVD   Lungs:     Clear to auscultation,respirations regular, even and                  unlabored    Heart:    Regular rhythm and normal rate, normal S1 and S2, no            murmur, no gallop, no rub, no click   Chest Wall:    No abnormalities observed   Abdomen:     Normal bowel sounds, no masses, no organomegaly, soft        non-tender, non-distended, no guarding, no rebound                tenderness   Extremities:   Moves all extremities well, no edema, no cyanosis, no             redness   Pulses:   Pulses palpable and equal bilaterally   Skin:   No bleeding, bruising or rash       Neurologic:   Alert and oriented   I have seen and performed a physical examination today.     Results review       Results Review:   Results from last 7 days   Lab Units 07/02/20  0106  07/01/20  0221 06/30/20  0139 06/29/20  0425 06/28/20  0626 06/27/20  1110   WBC 10*3/mm3 12.08* 15.39* 21.78* 21.57* 17.24* 15.86*   HEMOGLOBIN g/dL 11.1* 11.9* 12.9 12.6 12.5 13.0   PLATELETS 10*3/mm3 319 371 383 288 264 296     Results from last 7 days   Lab Units 07/02/20  0106 07/01/20 0221 06/30/20  1833 06/30/20  0139 06/29/20  0425 06/28/20  1715 06/28/20  0626 06/27/20  1110   SODIUM mmol/L 140 137  --  136 134*  --  141 133*   POTASSIUM mmol/L 3.8 4.0 4.6 3.5 4.0  4.0 3.4* 3.5 4.3   CHLORIDE mmol/L 110* 105  --  106 105  --  109* 100   CO2 mmol/L 19.4* 16.4*  --  16.5* 12.1*  --  18.0* 22.4   BUN mg/dL 12 10  --  7 4*  --  4* 5*   CREATININE mg/dL 0.54* 0.57  --  0.59 0.52*  --  0.71 0.80   CALCIUM mg/dL 7.9* 8.5*  --  8.6 8.9  --  8.4* 9.3   GLUCOSE mg/dL 100* 109*  --  133* 121*  --  110* 115*   ALT (SGPT) U/L  --  12  --  18 24  --  25 32   AST (SGOT) U/L  --  15  --  24 42*  --  22 35*     Results from last 7 days   Lab Units 06/30/20 2014 06/30/20  1435 06/30/20  0855 06/27/20  1952 06/27/20  1341   CK TOTAL U/L  --   --   --   --  79   TROPONIN T ng/mL 0.084* 0.125* 0.143* <0.010 <0.010     No results found for: INR  Lab Results   Component Value Date    MG 2.4 07/02/2020    MG 1.9 07/01/2020    MG 2.1 06/30/2020     Lab Results   Component Value Date    TSH 1.020 06/27/2020    TRIG 97 07/01/2020    HDL 31 (L) 07/01/2020     (H) 07/01/2020      Imaging Results (Last 48 Hours)     Procedure Component Value Units Date/Time    CT Chest Pulmonary Embolism [[id number]] Collected:  06/30/20 1555     Updated:  06/30/20 1600    Narrative:       EXAMINATION: CT CHEST PULMONARY EMBOLISM-      CLINICAL INDICATION:Hypoxia, tachycardia, fever; A41.9-Sepsis,  unspecified organism; K52.9-Noninfective gastroenteritis and colitis,  unspecified; I95.1-Orthostatic hypotension; S02.2XXA-Fracture of nasal  bones, initial encounter for closed fracture; S00.83XA-Contusion of  other part of head, initial encounter;  F19.10-Other psychoactive  substance abuse, uncomplicated; R19.7-Diarrhea, unspecified      COMPARISON: NONE.     TECHNIQUE: Multiple CT axial images were obtained through the level of  pulmonary arteries, following IV contrast administration per CT PE  protocol.  Volume Rendered 3D or MIP images performed.     Radiation dose reduction techniques were utilized per ALARA protocol.  Automated exposure control was initiated through either or [employer] or  [employer] software packages by  protocol.    DOSE (DLP mGy-cm):        FINDINGS:     Pulmonary arteries: No evidence of pulmonary embolism.  Lungs: Development of bilateral nonenhancing centralized airspace  disease predominantly in centrilobular distribution most consistent with  bilateral bacterial pneumonia. Interlobular septal thickening is noted  consistent with edema.  Heart: Borderline cardiomegaly is noted.  Pericardium: No effusion.  Mediastinum: No masses. No enlarged lymph nodes.  No fluid collections.  Pleura: Interval development of small nonloculated pleural effusions.  Major airways: Clear. No intrinsic mass.  Vasculature: No evidence of aneurysm.  Visualized upper abdomen:The upper abdomen is unremarkable as  visualized.  Other: None.  Bones: No acute bony abnormality.       Impression:       1. Bilateral pneumonia which may be bacterial in etiology.  2. Interstitial thickening with small pleural effusions which may  represent volume overload or cardiogenic edema.  3. No PE identified.     This report was finalized on 6/30/2020 3:58 PM by Dr. Jose Raul Blank MD.             ECG      Echo   Results for orders placed during the hospital encounter of 06/27/20   Adult Transthoracic Echo Complete W/ Cont if Necessary Per Protocol    Narrative · Estimated EF = 65%.  · Left ventricular systolic function is normal.  · Trace mitral regurgitation  · No significant valvular abnormality           I reviewed the patient's new clinical  results.    Telemetry: NSR 80-90 bpm        Medication Review:     aspirin 81 mg Oral Daily   enoxaparin 40 mg Subcutaneous Q24H   metoprolol tartrate 50 mg Oral Q12H   polyethylene glycol 17 g Oral Daily   rosuvastatin 10 mg Oral Nightly   sodium chloride 10 mL Intravenous Q12H   sodium chloride 10 mL Intravenous Q12H   valsartan 40 mg Oral Q24H            Assessment      Assessment:  1. Elevated troponin, initial troponin negative trending up to 0.143, now trending down, EKG tracing reviewed and shows ischemic changes   2. Sepsis secondary to colitis, improving   3. Recent falls with generalized weakness  4. History of severe bipolar disease  5. Positive UDS    Plan     Recommendations:  1. She denies any chest pain will proceed with cardiac catheterization in the morning  2. Tachycardia resolved continue current management  3. White count is coming down with no fever    I discussed the patients findings and my recommendations with patient and family      Coni EspanaGABE  07/02/20  12:00  I, Ludmila Murray MD, [facility], performed the services described in this documentation as documented by the above-named individual under my supervision and made necessary changes in the note is both accurate and complete  Please note that portions of this note were completed with a voice recognition program.

## 2020-07-02 NOTE — PLAN OF CARE
Problem: Pain, Chronic (Adult)  Goal: Identify Related Risk Factors and Signs and Symptoms  Outcome: Ongoing (interventions implemented as appropriate)  Goal: Acceptable Pain/Comfort Level and Functional Ability  Outcome: Ongoing (interventions implemented as appropriate)     Problem: Fall Risk (Adult)  Goal: Identify Related Risk Factors and Signs and Symptoms  Outcome: Ongoing (interventions implemented as appropriate)     Problem: Patient Care Overview  Goal: Plan of Care Review  Outcome: Ongoing (interventions implemented as appropriate)  Note:   Patient rested well today. She requested pain medication frequently but does not seem to be in pain. Heart cath scheduled for tomorrow. Will be held npo past midnight. Mother at bedside. On 3LNC humidified oxygen. Will continue to monitor.   Goal: Individualization and Mutuality  Outcome: Ongoing (interventions implemented as appropriate)  Goal: Discharge Needs Assessment  Outcome: Ongoing (interventions implemented as appropriate)  Goal: Interprofessional Rounds/Family Conf  Outcome: Ongoing (interventions implemented as appropriate)     Problem: Sepsis/Septic Shock (Adult)  Goal: Signs and Symptoms of Listed Potential Problems Will be Absent, Minimized or Managed (Sepsis/Septic Shock)  Outcome: Ongoing (interventions implemented as appropriate)

## 2020-07-02 NOTE — PROGRESS NOTES
PROGRESS NOTE         Patient Identification:  Name:  Rani Martins  Age:  49 y.o.  Sex:  female  :  1970  MRN:  [id number]  Visit Number:  [id number]  Primary Care Provider:  Santi Stewart MD         LOS: 5 days       ----------------------------------------------------------------------------------------------------------------------  Subjective       Chief Complaints:    Fall; Weakness - Generalized; and Diarrhea        Interval History:      Feeling well this AM. No complaints. T-max of 99.6 overnight. Denies any nausea, vomiting, or diarrhea. WBC improved at 12.08. CRP improved at 6.66.     Review of Systems:    Constitutional: no fever, chills and night sweats. No appetite change or unexpected weight change. No fatigue.  Eyes: no eye drainage, itching or redness.  HEENT: no mouth sores, dysphagia or nose bleed.  Respiratory: no for shortness of breath, cough or production of sputum.  Cardiovascular: no chest pain, no palpitations, no orthopnea.  Gastrointestinal: no nausea, vomiting or diarrhea. No abdominal pain, hematemesis or rectal bleeding.  Genitourinary: no dysuria or polyuria.  Hematologic/lymphatic: no lymph node abnormalities, no easy bruising or easy bleeding.  Musculoskeletal: no muscle or joint pain.  Skin: No rash and no itching.  Neurological: no loss of consciousness, no seizure, no headache.  Behavioral/Psych: no depression or suicidal ideation.  Endocrine: no hot flashes.  Immunologic: negative.  ----------------------------------------------------------------------------------------------------------------------      Objective       Current [facility] Meds:    aspirin 81 mg Oral Daily   cefTRIAXone 2 g Intravenous Q24H   enoxaparin 40 mg Subcutaneous Q24H   metoprolol tartrate 50 mg Oral Q12H   metroNIDAZOLE 500 mg Intravenous Q8H   polyethylene glycol 17 g Oral Daily   potassium phosphate 15 mmol Intravenous Once   rosuvastatin 10 mg Oral Nightly   sodium chloride 10 mL  Intravenous Q12H   sodium chloride 10 mL Intravenous Q12H   valsartan 40 mg Oral Q24H        ----------------------------------------------------------------------------------------------------------------------    Vital Signs:  Temp:  [97.5 °F (36.4 °C)-99.6 °F (37.6 °C)] 97.5 °F (36.4 °C)  Heart Rate:  [] 80  Resp:  [16-18] 18  BP: (109-149)/() 131/91  Mean Arterial Pressure (Non-Invasive) for the past 24 hrs (Last 3 readings):   Noninvasive MAP (mmHg)   07/02/20 0903 106   07/02/20 0802 111   07/02/20 0703 107     SpO2 Percentage    07/02/20 0703 07/02/20 0802 07/02/20 0903   SpO2: 100% 100% 100%     SpO2:  [91 %-100 %] 100 %  on  Flow (L/min):  [3-4] 3;   Device (Oxygen Therapy): humidified;nasal cannula    Body mass index is 31.21 kg/m².  Wt Readings from Last 3 Encounters:   07/02/20 74.9 kg (165 lb 1.6 oz)   01/10/19 54.4 kg (120 lb)   10/10/18 58.3 kg (128 lb 9.6 oz)        Intake/Output Summary (Last 24 hours) at 7/2/2020 1113  Last data filed at 7/2/2020 0800  Gross per 24 hour   Intake 1073.05 ml   Output 0 ml   Net 1073.05 ml     NPO Diet  ----------------------------------------------------------------------------------------------------------------------      Physical Exam:    Constitutional:  Well-developed and well-nourished.  No respiratory distress.      HENT:  Head: Normocephalic and atraumatic.  Mouth:  Moist mucous membranes.    Eyes:  Conjunctivae and EOM are normal.  No scleral icterus.  Neck:  Neck supple.  No JVD present.    Cardiovascular:  Normal rate, regular rhythm and normal heart sounds with no murmur. No edema.  Pulmonary/Chest:  No respiratory distress, no wheezes, no crackles, with normal breath sounds and good air movement.  Abdominal: Left lower quadrant tenderness improved.  Soft.  Bowel sounds are normal.  No distension.  No guarding or rebound tenderness.  Musculoskeletal:  No edema, no tenderness, and no deformity.  No swelling or redness of joints.  Neurological:   Alert and oriented to person, place, and time.  No facial droop.  No slurred speech.   Skin:  Skin is warm and dry.  No rash noted.  No pallor.   Psychiatric:  Normal mood and affect.  Behavior is normal.       ----------------------------------------------------------------------------------------------------------------------  Results from last 7 days   Lab Units 06/30/20 2014 06/30/20  1435 06/30/20  0855  06/27/20  1341   CK TOTAL U/L  --   --   --   --  79   TROPONIN T ng/mL 0.084* 0.125* 0.143*   < > <0.010    < > = values in this interval not displayed.     Results from last 7 days   Lab Units 06/27/20  1110   PROBNP pg/mL 549.7*     Results from last 7 days   Lab Units 07/01/20  0221   CHOLESTEROL mg/dL 162   TRIGLYCERIDES mg/dL 97   HDL CHOL mg/dL 31*   LDL CHOL mg/dL 112*     Results from last 7 days   Lab Units 06/27/20  1133   PH, ARTERIAL pH units 7.420   PO2 ART mm Hg 76.0*   PCO2, ARTERIAL mm Hg 36.9   HCO3 ART mmol/L 23.9     Results from last 7 days   Lab Units 07/02/20  0106 07/01/20  0221 06/30/20  0139  06/27/20  1110   CRP mg/dL 6.66* 7.19* 8.15*   < > 1.31*   LACTATE mmol/L  --   --   --   --  1.5   WBC 10*3/mm3 12.08* 15.39* 21.78*   < > 15.86*   HEMOGLOBIN g/dL 11.1* 11.9* 12.9   < > 13.0   HEMATOCRIT % 34.7 36.2 39.8   < > 40.0   MCV fL 93.3 89.6 91.3   < > 92.0   MCHC g/dL 32.0 32.9 32.4   < > 32.5   PLATELETS 10*3/mm3 319 371 383   < > 296    < > = values in this interval not displayed.     Results from last 7 days   Lab Units 07/02/20  0106 07/01/20  0221 06/30/20  1833 06/30/20  0139 06/29/20  0425   SODIUM mmol/L 140 137  --  136 134*   POTASSIUM mmol/L 3.8 4.0 4.6 3.5 4.0  4.0   MAGNESIUM mg/dL 2.4 1.9  --  2.1 2.4   CHLORIDE mmol/L 110* 105  --  106 105   CO2 mmol/L 19.4* 16.4*  --  16.5* 12.1*   BUN mg/dL 12 10  --  7 4*   CREATININE mg/dL 0.54* 0.57  --  0.59 0.52*   EGFR IF NONAFRICN AM mL/min/1.73 121 114  --  109 126   CALCIUM mg/dL 7.9* 8.5*  --  8.6 8.9   GLUCOSE mg/dL 100*  109*  --  133* 121*   ALBUMIN g/dL  --  3.12*  --  3.36* 3.17*   BILIRUBIN mg/dL  --  0.5  --  0.7 1.0   ALK PHOS U/L  --  79  --  96 116   AST (SGOT) U/L  --  15  --  24 42*   ALT (SGPT) U/L  --  12  --  18 24   Estimated Creatinine Clearance: 119.1 mL/min (A) (by C-G formula based on SCr of 0.54 mg/dL (L)).  No results found for: AMMONIA    No results found for: HGBA1C, POCGLU  Lab Results   Component Value Date    HGBA1C 5.00 01/29/2018     Lab Results   Component Value Date    TSH 1.020 06/27/2020    FREET4 0.95 01/19/2016       Blood Culture   Date Value Ref Range Status   06/27/2020 No growth at 3 days  Preliminary   06/27/2020 No growth at 3 days  Preliminary     No results found for: URINECX  No results found for: WOUNDCX  No results found for: STOOLCX  No results found for: RESPCX  Pain Management Panel     Pain Management Panel Latest Ref Rng & Units 6/30/2020 6/27/2020    AMPHETAMINES SCREEN, URINE Negative Negative Negative    BARBITURATES SCREEN Negative Negative Negative    BENZODIAZEPINE SCREEN, URINE Negative Positive(A) Positive(A)    BUPRENORPHINEUR Negative Negative Negative    COCAINE SCREEN, URINE Negative Negative Negative    METHADONE SCREEN, URINE Negative Negative Negative            ----------------------------------------------------------------------------------------------------------------------  Imaging Results (Last 24 Hours)     ** No results found for the last 24 hours. **          ----------------------------------------------------------------------------------------------------------------------    Assessment/Plan       Assessment/Plan     ASSESSMENT:    1.  Sepsis  2.  Colitis     PLAN:     Feeling well this AM. No complaints. T-max of 99.6 overnight. Denies any nausea, vomiting, or diarrhea. WBC improved at 12.08. CRP improved at 6.66.     Negative C. difficile toxin PCR and GI panel.  CT of the abdomen and pelvis on 6/27/2020 shows appearance suggestive of left-sided colitis.  CT chest on 6/30/20 revealed bilateral pneumonia which may be bacterial in etiology.     Patient completed course of antibiotic therapy today. Stable from ID standpoint for discharge home. Will monitor off of coverage.      Code Status:   Code Status and Medical Interventions:   Ordered at: 06/27/20 1448     Code Status:    CPR     Medical Interventions (Level of Support Prior to Arrest):    Full       Shawna Beck, GABE  07/02/20  11:13    Physician Attestation:    I have personally performed a face-to-face evaluation on this patient. I have collected the review of systems and performed my own physical exam. I reviewed the patient's data including history of present illness, past medical history, past surgical history and allergy list. . The assessment and plan documented above are my own after discussing the case in detail with the APC. I have reviewed the laboratory and radiological pertinent results. I have reviewed and edited the note above after discussing the findings with the APC.    Kameron Tay MD  Infectious Diseases  07/02/20  11:13

## 2020-07-02 NOTE — PLAN OF CARE
Problem: Patient Care Overview  Goal: Plan of Care Review  Outcome: Ongoing (interventions implemented as appropriate)  Flowsheets (Taken 7/2/2020 4249)  Progress: improving  Plan of Care Reviewed With: patient  Outcome Summary: Patient has utilized PRN pain medication x1 with no complaints of nausea or vomiting throughout shift. Patient still on 4L nasal cannula humidified. Patient's 02 sats drop when patient takes nasal cannula off. Patient has been NPO since midnight and prepped for heart cath this morning if needed. Patient has been running 90s-100s on monitor. Will continue to monitor.  Goal: Individualization and Mutuality  Outcome: Ongoing (interventions implemented as appropriate)  Goal: Discharge Needs Assessment  Outcome: Ongoing (interventions implemented as appropriate)  Goal: Interprofessional Rounds/Family Conf  Outcome: Ongoing (interventions implemented as appropriate)     Problem: Pain, Chronic (Adult)  Goal: Identify Related Risk Factors and Signs and Symptoms  Outcome: Ongoing (interventions implemented as appropriate)  Goal: Acceptable Pain/Comfort Level and Functional Ability  Outcome: Ongoing (interventions implemented as appropriate)     Problem: Fall Risk (Adult)  Goal: Identify Related Risk Factors and Signs and Symptoms  Outcome: Ongoing (interventions implemented as appropriate)     Problem: Sepsis/Septic Shock (Adult)  Goal: Signs and Symptoms of Listed Potential Problems Will be Absent, Minimized or Managed (Sepsis/Septic Shock)  Outcome: Ongoing (interventions implemented as appropriate)

## 2020-07-03 VITALS
OXYGEN SATURATION: 94 % | WEIGHT: 160.1 LBS | SYSTOLIC BLOOD PRESSURE: 130 MMHG | HEIGHT: 61 IN | RESPIRATION RATE: 12 BRPM | DIASTOLIC BLOOD PRESSURE: 81 MMHG | TEMPERATURE: 98.5 F | BODY MASS INDEX: 30.23 KG/M2 | HEART RATE: 79 BPM

## 2020-07-03 PROBLEM — A41.9 SEPSIS: Status: RESOLVED | Noted: 2020-06-27 | Resolved: 2020-07-03

## 2020-07-03 LAB
ANION GAP SERPL CALCULATED.3IONS-SCNC: 11.6 MMOL/L (ref 5–15)
BASOPHILS # BLD AUTO: 0.06 10*3/MM3 (ref 0–0.2)
BASOPHILS NFR BLD AUTO: 0.6 % (ref 0–1.5)
BUN SERPL-MCNC: 11 MG/DL (ref 6–20)
BUN/CREAT SERPL: 18 (ref 7–25)
CALCIUM SPEC-SCNC: 7.9 MG/DL (ref 8.6–10.5)
CHLORIDE SERPL-SCNC: 108 MMOL/L (ref 98–107)
CO2 SERPL-SCNC: 23.4 MMOL/L (ref 22–29)
CREAT SERPL-MCNC: 0.61 MG/DL (ref 0.57–1)
CRP SERPL-MCNC: 4.76 MG/DL (ref 0–0.5)
DEPRECATED RDW RBC AUTO: 47.5 FL (ref 37–54)
EOSINOPHIL # BLD AUTO: 1.16 10*3/MM3 (ref 0–0.4)
EOSINOPHIL NFR BLD AUTO: 11.6 % (ref 0.3–6.2)
ERYTHROCYTE [DISTWIDTH] IN BLOOD BY AUTOMATED COUNT: 13.9 % (ref 12.3–15.4)
GFR SERPL CREATININE-BSD FRML MDRD: 105 ML/MIN/1.73
GLUCOSE SERPL-MCNC: 100 MG/DL (ref 65–99)
HCT VFR BLD AUTO: 33.5 % (ref 34–46.6)
HGB BLD-MCNC: 10.5 G/DL (ref 12–15.9)
IMM GRANULOCYTES # BLD AUTO: 0.26 10*3/MM3 (ref 0–0.05)
IMM GRANULOCYTES NFR BLD AUTO: 2.6 % (ref 0–0.5)
LYMPHOCYTES # BLD AUTO: 2.6 10*3/MM3 (ref 0.7–3.1)
LYMPHOCYTES NFR BLD AUTO: 25.9 % (ref 19.6–45.3)
MCH RBC QN AUTO: 29.5 PG (ref 26.6–33)
MCHC RBC AUTO-ENTMCNC: 31.3 G/DL (ref 31.5–35.7)
MCV RBC AUTO: 94.1 FL (ref 79–97)
MONOCYTES # BLD AUTO: 1.08 10*3/MM3 (ref 0.1–0.9)
MONOCYTES NFR BLD AUTO: 10.8 % (ref 5–12)
NEUTROPHILS NFR BLD AUTO: 4.86 10*3/MM3 (ref 1.7–7)
NEUTROPHILS NFR BLD AUTO: 48.5 % (ref 42.7–76)
NRBC BLD AUTO-RTO: 0 /100 WBC (ref 0–0.2)
PHOSPHATE SERPL-MCNC: 2.4 MG/DL (ref 2.5–4.5)
PLATELET # BLD AUTO: 367 10*3/MM3 (ref 140–450)
PMV BLD AUTO: 10.9 FL (ref 6–12)
POTASSIUM SERPL-SCNC: 4.4 MMOL/L (ref 3.5–5.2)
RBC # BLD AUTO: 3.56 10*6/MM3 (ref 3.77–5.28)
SODIUM SERPL-SCNC: 143 MMOL/L (ref 136–145)
WBC # BLD AUTO: 10.02 10*3/MM3 (ref 3.4–10.8)

## 2020-07-03 PROCEDURE — B2151ZZ FLUOROSCOPY OF LEFT HEART USING LOW OSMOLAR CONTRAST: ICD-10-PCS | Performed by: SPECIALIST

## 2020-07-03 PROCEDURE — 99239 HOSP IP/OBS DSCHRG MGMT >30: CPT | Performed by: INTERNAL MEDICINE

## 2020-07-03 PROCEDURE — B2111ZZ FLUOROSCOPY OF MULTIPLE CORONARY ARTERIES USING LOW OSMOLAR CONTRAST: ICD-10-PCS | Performed by: SPECIALIST

## 2020-07-03 PROCEDURE — C1894 INTRO/SHEATH, NON-LASER: HCPCS | Performed by: SPECIALIST

## 2020-07-03 PROCEDURE — 25010000002 MORPHINE PER 10 MG: Performed by: SPECIALIST

## 2020-07-03 PROCEDURE — 25010000002 FENTANYL CITRATE (PF) 100 MCG/2ML SOLUTION: Performed by: SPECIALIST

## 2020-07-03 PROCEDURE — 94799 UNLISTED PULMONARY SVC/PX: CPT

## 2020-07-03 PROCEDURE — C1769 GUIDE WIRE: HCPCS | Performed by: SPECIALIST

## 2020-07-03 PROCEDURE — 85025 COMPLETE CBC W/AUTO DIFF WBC: CPT | Performed by: INTERNAL MEDICINE

## 2020-07-03 PROCEDURE — 84100 ASSAY OF PHOSPHORUS: CPT | Performed by: INTERNAL MEDICINE

## 2020-07-03 PROCEDURE — 25010000002 HEPARIN (PORCINE) PER 1000 UNITS: Performed by: SPECIALIST

## 2020-07-03 PROCEDURE — 93458 L HRT ARTERY/VENTRICLE ANGIO: CPT | Performed by: SPECIALIST

## 2020-07-03 PROCEDURE — 25010000002 MIDAZOLAM PER 1 MG: Performed by: SPECIALIST

## 2020-07-03 PROCEDURE — 80048 BASIC METABOLIC PNL TOTAL CA: CPT | Performed by: INTERNAL MEDICINE

## 2020-07-03 PROCEDURE — 0 IOPAMIDOL PER 1 ML: Performed by: SPECIALIST

## 2020-07-03 PROCEDURE — 25010000002 MORPHINE PER 10 MG: Performed by: INTERNAL MEDICINE

## 2020-07-03 PROCEDURE — 86140 C-REACTIVE PROTEIN: CPT | Performed by: INTERNAL MEDICINE

## 2020-07-03 PROCEDURE — 4A023N7 MEASUREMENT OF CARDIAC SAMPLING AND PRESSURE, LEFT HEART, PERCUTANEOUS APPROACH: ICD-10-PCS | Performed by: SPECIALIST

## 2020-07-03 RX ORDER — ATORVASTATIN CALCIUM 10 MG/1
10 TABLET, FILM COATED ORAL DAILY
Qty: 30 TABLET | Refills: 0 | Status: SHIPPED | OUTPATIENT
Start: 2020-07-03 | End: 2020-08-02

## 2020-07-03 RX ORDER — LIDOCAINE HYDROCHLORIDE 20 MG/ML
INJECTION, SOLUTION INFILTRATION; PERINEURAL AS NEEDED
Status: DISCONTINUED | OUTPATIENT
Start: 2020-07-03 | End: 2020-07-03 | Stop reason: HOSPADM

## 2020-07-03 RX ORDER — MIDAZOLAM HYDROCHLORIDE 1 MG/ML
INJECTION INTRAMUSCULAR; INTRAVENOUS AS NEEDED
Status: DISCONTINUED | OUTPATIENT
Start: 2020-07-03 | End: 2020-07-03 | Stop reason: HOSPADM

## 2020-07-03 RX ORDER — FENTANYL CITRATE 50 UG/ML
INJECTION, SOLUTION INTRAMUSCULAR; INTRAVENOUS AS NEEDED
Status: DISCONTINUED | OUTPATIENT
Start: 2020-07-03 | End: 2020-07-03 | Stop reason: HOSPADM

## 2020-07-03 RX ORDER — SODIUM CHLORIDE 9 MG/ML
INJECTION, SOLUTION INTRAVENOUS CONTINUOUS PRN
Status: COMPLETED | OUTPATIENT
Start: 2020-07-03 | End: 2020-07-03

## 2020-07-03 RX ADMIN — SODIUM CHLORIDE, PRESERVATIVE FREE 10 ML: 5 INJECTION INTRAVENOUS at 09:05

## 2020-07-03 RX ADMIN — SODIUM PHOSPHATE, MONOBASIC, MONOHYDRATE 15 MMOL: 276; 142 INJECTION, SOLUTION INTRAVENOUS at 11:54

## 2020-07-03 RX ADMIN — VALSARTAN 40 MG: 80 TABLET, FILM COATED ORAL at 09:04

## 2020-07-03 RX ADMIN — SODIUM CHLORIDE, PRESERVATIVE FREE 10 ML: 5 INJECTION INTRAVENOUS at 09:04

## 2020-07-03 RX ADMIN — MORPHINE SULFATE 1 MG: 2 INJECTION, SOLUTION INTRAMUSCULAR; INTRAVENOUS at 02:17

## 2020-07-03 RX ADMIN — METOPROLOL TARTRATE 50 MG: 50 TABLET, FILM COATED ORAL at 09:03

## 2020-07-03 RX ADMIN — ASPIRIN 81 MG: 81 TABLET, COATED ORAL at 09:04

## 2020-07-03 RX ADMIN — MORPHINE SULFATE 1 MG: 2 INJECTION, SOLUTION INTRAMUSCULAR; INTRAVENOUS at 10:42

## 2020-07-03 NOTE — DISCHARGE SUMMARY
[facility] Medicine Services  DISCHARGE SUMMARY    Patient Identification:  Name:  Rani Martins  Age:  49 y.o.  Sex:  female  :  1970  MRN:  [id number]  Visit Number:  [id number]    Date of Admission: 2020  Date of Discharge:  7/3/2020    PCP: Santi Stewart MD      Admission/Discharge Diagnoses     Discharge Diagnoses:  · Sepsis due to colitis and pneumonia, resolved  · Acute colitis, improved  · Bilateral pneumonia, improved  · NSTEMI, LHC showed normal coronary arteries  · Sinus bradycardia, resolved  · Essential hypertension  · Dizziness with recent fall and generalized weakness likely due to orthostatic hypotension  · Concern for substance abuse with abnormal UDS.    Consults/Procedures     Consults:   Consults     Date and Time Order Name Status Description    2020 0958 Inpatient Cardiology Consult Completed     2020 0742 Inpatient Infectious Diseases Consult Completed           Procedures Performed:  Procedure(s):  Left Heart Cath       History of Presenting Illness   Patient is a 49 y.o. female presented to [facility] complaining of dizziness, diarrhea and difficulty walking.  Please see the admitting history and physical for further details.    Hospital Course   Rani Martins is a 49 y.o. female with PMH significant for anxiety, bipolar disorder, depression, and fibromyalgia presented to the emergency department  for further evaluation of dizziness and difficulty walking.  She was diagnosed with sepsis due to colitis and was admitted for further evaluation management.    Patient was started on Rocephin and Flagyl per ID recommendations for colitis and bilateral pneumonia.  CT scan of the chest showed bilateral pneumonia and no PE.  CT scan of the abdomen showed left-sided colitis.  GI panel and C. difficile toxins were negative.  Patient had improvement in nausea, vomiting, abdominal pain and diarrhea.    During the course of hospitalization,  patient complained of intermittent chest tightness.  EKG revealed tachycardia and T wave inversions.  Troponins were elevated at 0.143.  Cardiology was consulted and echocardiogram showed normal LV SF, EF of 65%, no significant valvular abnormality.  Patient was continued on aspirin, statin, beta-blocker and ARB.  Cardiology recommended further evaluation with [facility].  Patient underwent cardiac catheterization on 7/3/2020 that showed normal left ventricular systolic function with elevated left ventricular end-diastolic pressure consistent with diastolic dysfunction, right dominant system, normal coronary arteries.  Patient had episodes of sinus bradycardia which resolved.  TSH electrolytes were monitored and were within normal limits.  Patient was complaining of dizziness and generalized weakness on admission which improved.  Patient had hypotension with orthostasis on admission which was thought to be causing her dizziness and generalized weakness.  Furthermore, patient's UDS showed benzodiazepines and opiates which would not prescribe medication.  CT scan of the head was done that showed no acute intracranial abnormality.  CT scan of the facial bones revealed equivocal minimally displaced nasal bone fracture and no acute fracture on imaging of cervical, thoracic and lumbar spine.  She had improvement in her dizziness.  Her dizziness was thought to be secondary to orthostatic hypotension as well as possible substance abuse.  Abnormal UDS was discussed with the patient but she adamantly denied any substance abuse.  To the patient the dangers of taking unprescribed medication as they can lead to respiratory depression and death and advised patient not to take any street drugs or any unprescribed medications.  Patient is felt to have achieved max benefit of hospitalization and will be discharged in stable condition.      Discharge Vitals/Physical Examination     Vital Signs:  Temp:  [98.3 °F (36.8 °C)-98.9 °F (37.2 °C)]  98.5 °F (36.9 °C)  Heart Rate:  [] 85  Resp:  [10-20] 11  BP: (107-169)/(69-97) 147/93  Mean Arterial Pressure (Non-Invasive) for the past 24 hrs (Last 3 readings):   Noninvasive MAP (mmHg)   07/03/20 1217 102   07/03/20 1202 99   07/03/20 1147 114     SpO2 Percentage    07/03/20 1147 07/03/20 1202 07/03/20 1217   SpO2: 94% 94% 96%     SpO2:  [91 %-100 %] 96 %  on  Flow (L/min):  [2-3] 2;   Device (Oxygen Therapy): nasal cannula    Body mass index is 30.27 kg/m².  Wt Readings from Last 3 Encounters:   07/03/20 72.6 kg (160 lb 1.6 oz)   01/10/19 54.4 kg (120 lb)   10/10/18 58.3 kg (128 lb 9.6 oz)         Physical Exam:  GEN: Well-developed and well-nourished.     EYES: Pupils are equal, round, and reactive to light.   HENT: Neck supple.  No JVD present.   CV: Regular rate and rhythm. S1+S2. No murmur, rubs or gallops.   PULM: Few expiratory rhonchi in left basilar area on posterior hemithorax otherwise clear to auscultation bilaterally.  GI: Abdomen is soft and mild tenderness to palpation in lower quadrants, no viscera palpable and bowel sounds audible.  CNS: Alert and oriented to place, person and time.  Cranial nerves grossly intact.  Strength bilaterally symmetrical in upper and lower extremities.  SKIN: Skin is warm and dry.  No rash noted.  No pallor.  MSK: No deformity or joint swelling.      Pertinent Laboratory/Radiology Results     Pertinent Laboratory Results:  Results from last 7 days   Lab Units 07/02/20  1306 06/30/20 2014 06/30/20  1435  06/27/20  1341   CK TOTAL U/L  --   --   --   --  79   TROPONIN T ng/mL 0.112* 0.084* 0.125*   < > <0.010    < > = values in this interval not displayed.     Results from last 7 days   Lab Units 06/27/20  1110   PROBNP pg/mL 549.7*     Results from last 7 days   Lab Units 07/01/20  0221   CHOLESTEROL mg/dL 162   TRIGLYCERIDES mg/dL 97   HDL CHOL mg/dL 31*   LDL CHOL mg/dL 112*     Results from last 7 days   Lab Units 06/27/20  1133   PH, ARTERIAL pH units 7.420    PO2 ART mm Hg 76.0*   PCO2, ARTERIAL mm Hg 36.9   HCO3 ART mmol/L 23.9     Results from last 7 days   Lab Units 07/03/20 0042 07/02/20 0106 07/01/20 0221 06/27/20  1110   CRP mg/dL 4.76* 6.66* 7.19*   < > 1.31*   LACTATE mmol/L  --   --   --   --  1.5   WBC 10*3/mm3 10.02 12.08* 15.39*   < > 15.86*   HEMOGLOBIN g/dL 10.5* 11.1* 11.9*   < > 13.0   HEMATOCRIT % 33.5* 34.7 36.2   < > 40.0   MCV fL 94.1 93.3 89.6   < > 92.0   MCHC g/dL 31.3* 32.0 32.9   < > 32.5   PLATELETS 10*3/mm3 367 319 371   < > 296    < > = values in this interval not displayed.     Results from last 7 days   Lab Units 07/03/20 0042 07/02/20 0106 07/01/20 0221 06/30/20  0139 06/29/20  0425   SODIUM mmol/L 143 140 137  --  136 134*   POTASSIUM mmol/L 4.4 3.8 4.0   < > 3.5 4.0  4.0   MAGNESIUM mg/dL  --  2.4 1.9  --  2.1 2.4   CHLORIDE mmol/L 108* 110* 105  --  106 105   CO2 mmol/L 23.4 19.4* 16.4*  --  16.5* 12.1*   BUN mg/dL 11 12 10  --  7 4*   CREATININE mg/dL 0.61 0.54* 0.57  --  0.59 0.52*   EGFR IF NONAFRICN AM mL/min/1.73 105 121 114  --  109 126   CALCIUM mg/dL 7.9* 7.9* 8.5*  --  8.6 8.9   GLUCOSE mg/dL 100* 100* 109*  --  133* 121*   ALBUMIN g/dL  --   --  3.12*  --  3.36* 3.17*   BILIRUBIN mg/dL  --   --  0.5  --  0.7 1.0   ALK PHOS U/L  --   --  79  --  96 116   AST (SGOT) U/L  --   --  15  --  24 42*   ALT (SGPT) U/L  --   --  12  --  18 24    < > = values in this interval not displayed.   Estimated Creatinine Clearance: 103.9 mL/min (by C-G formula based on SCr of 0.61 mg/dL).  No results found for: AMMONIA    No results found for: HGBA1C, POCGLU  Lab Results   Component Value Date    HGBA1C 5.00 01/29/2018     Lab Results   Component Value Date    TSH 1.020 06/27/2020    FREET4 0.95 01/19/2016       Blood Culture   Date Value Ref Range Status   06/27/2020 No growth at 5 days  Final   06/27/2020 No growth at 5 days  Final     No results found for: URINECX  No results found for: WOUNDCX  No results found for: STOOLCX  No  results found for: RESPCX  Microbiology Results (last 10 days)     Procedure Component Value - Date/Time    COVID PRE-OP / PRE-PROCEDURE SCREENING ORDER (NO ISOLATION) - Swab, Nasopharynx [[id number]] Collected:  07/02/20 1521    Lab Status:  Final result Specimen:  Swab from Nasopharynx Updated:  07/02/20 1624    Narrative:       The following orders were created for panel order COVID PRE-OP / PRE-PROCEDURE SCREENING ORDER (NO ISOLATION) - Swab, Nasopharynx.  Procedure                               Abnormality         Status                     ---------                               -----------         ------                     COVID-19, ABBOTT IN-HOUS...[[id number]]  Normal              Final result                 Please view results for these tests on the individual orders.    COVID-19, ABBOTT IN-HOUSE,NP Swab (NO TRANSPORT MEDIA) 2 HR TAT - Swab, Nasopharynx [[id number]]  (Normal) Collected:  07/02/20 1521    Lab Status:  Final result Specimen:  Swab from Nasopharynx Updated:  07/02/20 1624     COVID19 Not Detected    Narrative:       Fact sheet for providers: https://www.fda.gov/media/221213/download     Fact sheet for patients: https://www.fda.gov/media/395856/download    Gastrointestinal Panel, PCR - Stool, Per Rectum [[id number]]  (Normal) Collected:  06/28/20 1602    Lab Status:  Final result Specimen:  Stool from Per Rectum Updated:  06/28/20 1849     Campylobacter Not Detected     Plesiomonas shigelloides Not Detected     Salmonella Not Detected     Vibrio Not Detected     Vibrio cholerae Not Detected     Yersinia enterocolitica Not Detected     Enteroaggregative E. coli (EAEC) Not Detected     Enteropathogenic E. coli (EPEC) Not Detected     Enterotoxigenic E. coli (ETEC) lt/st Not Detected     Shiga-like toxin-producing E. coli (STEC) stx1/stx2 Not Detected     E. coli O157 Not Detected     Shigella/Enteroinvasive E. coli (EIEC) Not Detected     Cryptosporidium Not Detected     Cyclospora cayetanensis  Not Detected     Entamoeba histolytica Not Detected     Giardia lamblia Not Detected     Adenovirus F40/41 Not Detected     Astrovirus Not Detected     Norovirus GI/GII Not Detected     Rotavirus A Not Detected     Sapovirus (I, II, IV or V) Not Detected    Clostridium Difficile Toxin - Stool, Per Rectum [[id number]] Collected:  06/28/20 1602    Lab Status:  Final result Specimen:  Stool from Per Rectum Updated:  06/28/20 1849    Narrative:       The following orders were created for panel order Clostridium Difficile Toxin - Stool, Per Rectum.  Procedure                               Abnormality         Status                     ---------                               -----------         ------                     Clostridium Difficile To...[[id number]]                      Final result                 Please view results for these tests on the individual orders.    Clostridium Difficile Toxin, PCR - Stool, Per Rectum [[id number]] Collected:  06/28/20 1602    Lab Status:  Final result Specimen:  Stool from Per Rectum Updated:  06/28/20 1849     C. Difficile Toxins by PCR Negative     027 Toxin Presumptive Negative    Narrative:       For In Vitro Diagnostic use only.  027-NAP1-BI results are NOT intended to guide treatment. 027 typing is relative to PCR ribotyping and shown to be equivalent to B1 typing by restriction endonuclease analysis or NAP1 typing by pulse field gel electrophoresis.    Blood Culture - Blood, Arm, Left [[id number]] Collected:  06/27/20 1121    Lab Status:  Final result Specimen:  Blood from Arm, Left Updated:  07/02/20 1145     Blood Culture No growth at 5 days    Blood Culture - Blood, Arm, Right [[id number]] Collected:  06/27/20 1105    Lab Status:  Final result Specimen:  Blood from Arm, Right Updated:  07/02/20 1145     Blood Culture No growth at 5 days        Pain Management Panel     Pain Management Panel Latest Ref Rng & Units 6/30/2020 6/27/2020    AMPHETAMINES SCREEN, URINE Negative  Negative Negative    BARBITURATES SCREEN Negative Negative Negative    BENZODIAZEPINE SCREEN, URINE Negative Positive(A) Positive(A)    BUPRENORPHINEUR Negative Negative Negative    COCAINE SCREEN, URINE Negative Negative Negative    METHADONE SCREEN, URINE Negative Negative Negative          Pertinent Radiology Results:  Imaging Results (All)     Procedure Component Value Units Date/Time    CT Chest Pulmonary Embolism [[id number]] Collected:  06/30/20 1555     Updated:  06/30/20 1600    Narrative:       EXAMINATION: CT CHEST PULMONARY EMBOLISM-      CLINICAL INDICATION:Hypoxia, tachycardia, fever; A41.9-Sepsis,  unspecified organism; K52.9-Noninfective gastroenteritis and colitis,  unspecified; I95.1-Orthostatic hypotension; S02.2XXA-Fracture of nasal  bones, initial encounter for closed fracture; S00.83XA-Contusion of  other part of head, initial encounter; F19.10-Other psychoactive  substance abuse, uncomplicated; R19.7-Diarrhea, unspecified      COMPARISON: NONE.     TECHNIQUE: Multiple CT axial images were obtained through the level of  pulmonary arteries, following IV contrast administration per CT PE  protocol.  Volume Rendered 3D or MIP images performed.     Radiation dose reduction techniques were utilized per ALARA protocol.  Automated exposure control was initiated through either or [employer] or  DoseRight software packages by  protocol.    DOSE (DLP mGy-cm):        FINDINGS:     Pulmonary arteries: No evidence of pulmonary embolism.  Lungs: Development of bilateral nonenhancing centralized airspace  disease predominantly in centrilobular distribution most consistent with  bilateral bacterial pneumonia. Interlobular septal thickening is noted  consistent with edema.  Heart: Borderline cardiomegaly is noted.  Pericardium: No effusion.  Mediastinum: No masses. No enlarged lymph nodes.  No fluid collections.  Pleura: Interval development of small nonloculated pleural effusions.  Major airways:  Clear. No intrinsic mass.  Vasculature: No evidence of aneurysm.  Visualized upper abdomen:The upper abdomen is unremarkable as  visualized.  Other: None.  Bones: No acute bony abnormality.       Impression:       1. Bilateral pneumonia which may be bacterial in etiology.  2. Interstitial thickening with small pleural effusions which may  represent volume overload or cardiogenic edema.  3. No PE identified.     This report was finalized on 6/30/2020 3:58 PM by Dr. Jose Raul Blank MD.       XR Abdomen KUB [[id number]] Collected:  06/29/20 1533     Updated:  06/29/20 1536    Narrative:       EXAMINATION: XR ABDOMEN KUB-      TECHNIQUE: Single KUB     CLINICAL INDICATION:     Nausea, abdominal pain; A41.9-Sepsis,  unspecified organism; K52.9-Noninfective gastroenteritis and colitis,  unspecified; I95.1-Orthostatic hypotension; S02.2XXA-Fracture of nasal  bones, initial encounter for closed fracture; S00.83XA-Contusion of  other part of head, initial encounter; F19.10-Other psychoactive  substance abuse, uncomplicated; R19.7-Diarrhea, unspecified      COMPARISON:    06/20/2020     FINDINGS:    There are no calcifications projecting over the kidneys or along the  expected course of the ureters.  Bowel gas pattern is nonspecific and nonobstructive in appearance.   Scattered fecal material seen throughout colon.  The bony structures are unremarkable.  No definite radiographic evidence of soft tissue mass.          Impression:          Unremarkable appearance of bowel gas pattern. No ileus or obstruction  suggested on radiograph.     This report was finalized on 6/29/2020 3:34 PM by Dr. Jose Raul Blank MD.       XR Abdomen KUB [[id number]] Collected:  06/28/20 1213     Updated:  06/28/20 1226    Narrative:       EXAMINATION: XR ABDOMEN KUB-      CLINICAL INDICATION: Persistent nausea, vomiting, abd pain;  A41.9-Sepsis, unspecified organism; K52.9-Noninfective gastroenteritis  and colitis, unspecified; I95.1-Orthostatic  hypotension;  S02.2XXA-Fracture of nasal bones, initial encounter for closed fracture;  S00.83XA-Contusion of other part of head, initial encounter;  F19.10-Other psychoactive substance abuse, uncomplicated;  R19.7-Diarrhea, unspecified        COMPARISON: None available     FINDINGS: One view of the abdomen demonstrates no evidence of bowel  obstruction     Moderate stool in the left colon.       Impression:       Few loops of air-filled small bowel without significant  distention. Moderate stool in the colon      This report was finalized on 6/28/2020 12:14 PM by Dr. Will Hansen MD.       CT Abdomen Pelvis With Contrast [[id number]] Collected:  06/27/20 1325     Updated:  06/27/20 1333    Narrative:       CT ABDOMEN PELVIS W CONTRAST-     CLINICAL INDICATION: abd pain        COMPARISON: None available     TECHNIQUE: Axial images were acquired from the lung bases through the  pubic symphysis after IV , but without oral contrast.  Reformatted images were created in both the coronal and sagittal planes.     Radiation dose reduction techniques were utilized per ALARA protocol.  Automated exposure control was initiated through either or [employer] or  DoseRight software packages by  protocol.           FINDINGS:   The lung bases are clear. There are no pleural effusions.     The liver is homogeneous. There is no evidence of focal hepatic mass     The spleen is homogeneous     There is no peripancreatic stranding or pancreatic head mass.     There is no adrenal enlargement.     The kidneys show no evidence of hydronephrosis or hydroureter. I do not  see any distal ureteral stones.      Otherwise I do not see any free fluid or walled off fluid collections.     Abnormal thickening of the left colon wall. The appearance is most  suggestive of fairly extensive left-sided colitis.             Impression:       :   1. Appearance suggestive of left-sided colitis.  2. No hemoperitoneum or solid organ injury.                  This report was finalized on 6/27/2020 1:26 PM by Dr. Will aHnsen MD.       CT Facial Bones Without Contrast [[id number]] Collected:  06/27/20 1204     Updated:  06/27/20 1208    Narrative:       EXAMINATION: CT FACIAL BONES WO CONTRAST-      CLINICAL INDICATION: fall        COMPARISON: None available      DOSE:     Radiation dose reduction techniques were utilized per ALARA protocol.  Automated exposure control was initiated through either or [employer] or  DoseRight software packages by  protocol.              TECHNIQUE: Axial images were acquired through thefacial bones without IV  contrast. Reformatted images were created.     FINDINGS:  Probable minimally displaced bilateral nasal bone fractures     The pterygoid plates are intact     Tiny air-fluid level in the left maxillary sinus probably related to  sinusitis. I do not see a maxillary fracture.     Coronal imaging show no evidence of an orbital fracture.     No mandibular fracture.       Impression:       Equivocal minimally displaced nasal bone fractures.     This report was finalized on 6/27/2020 12:06 PM by Dr. Will Hansen MD.       CT Lumbar Spine Without Contrast [[id number]] Collected:  06/27/20 1202     Updated:  06/27/20 1206    Narrative:       EXAMINATION: CT LUMBAR SPINE WO CONTRAST-      CLINICAL INDICATION: fall        COMPARISON: None immediately available      DOSE:     Radiation dose reduction techniques were utilized per ALARA protocol.  Automated exposure control was initiated through either or [employer] or  DoseRight software packages by  protocol.              TECHNIQUE: Axial images were acquired through thelumbar spine without IV  contrast. Reformatted images were created.     FINDINGS:  Today's study shows preservation of the vertebral body heights     Disc space heights are uniform with the exception of disc space  narrowing at L5-S1.  There is arthritic change in the posterior elements at L5-S1     No  paraspinal hematoma       Impression:       No acute lumbar abnormality     This report was finalized on 6/27/2020 12:04 PM by Dr. Will Hansen MD.       CT Cervical Spine Without Contrast [[id number]] Collected:  06/27/20 1200     Updated:  06/27/20 1206    Narrative:       CT CERVICAL SPINE WO CONTRAST-     CLINICAL INDICATION: fall        COMPARISON: None immediately available      TECHNIQUE: Axial images of the cervical spine were acquired with out any  intravenous contrast. Reformatted images were then created in the  sagittal and coronal planes.     DOSE:      Radiation dose reduction techniques were utilized per ALARA protocol.  Automated exposure control was initiated through either or [employer] or  [employer] software packages by  protocol.           FINDINGS:   The provided study demonstrates preservation of the vertebral body  heights in the sagittally reconstructed images.  There is no prevertebral soft tissue swelling.  Alignment is near anatomic.  The disc space heights are uniform.  I see no acute cervical spine fracture.       Impression:       No acute fracture     This report was finalized on 6/27/2020 12:01 PM by Dr. Will Hansen MD.       CT Thoracic Spine Without Contrast [[id number]] Collected:  06/27/20 1201     Updated:  06/27/20 1206    Narrative:       EXAMINATION: CT THORACIC SPINE WO CONTRAST-      CLINICAL INDICATION: Fall        COMPARISON: None immediately available      DOSE:     Radiation dose reduction techniques were utilized per ALARA protocol.  Automated exposure control was initiated through either or [employer] or  DoseRight software packages by  protocol.              TECHNIQUE: Axial images were acquired through thethoracic spine without  IV contrast. Reformatted images were created.     FINDINGS:  Today's study shows preservation of the vertebral body heights     The disc space heights are uniform.     No paraspinal hematoma     No blastic or lytic  lesion.       Impression:       No acute thoracic abnormality     This report was finalized on 6/27/2020 12:02 PM by Dr. Will Hansen MD.       CT Head Without Contrast [[id number]] Collected:  06/27/20 1159     Updated:  06/27/20 1206    Narrative:       CT HEAD WO CONTRAST-     CLINICAL INDICATION: Fall        COMPARISON: None immediately available      TECHNIQUE: Axial images of the brain were obtained with out intravenous  contrast.  Reformatted images were created in the sagittal and coronal  planes.     DOSE:     Radiation dose reduction techniques were utilized per ALARA protocol.  Automated exposure control was initiated through either or [employer] or  DoseRight software packages by  protocol.           FINDINGS:   Today's study shows no mass, hemorrhage, or midline shift.   The ventricles, cisterns, and sulci are unremarkable. There is no  hydrocephalus.   There is no evidence of acute ischemia.  I do not see epidural or subdural hematoma.  The gray-white differentiation is appropriate.   The bone window setting images show no destructive calvarial lesion or  acute calvarial fracture.   The posterior fossa is unremarkable.          Impression:       No acute intracranial pathology. Nothing is seen on this exam to  specifically account for the patient's symptoms.     This report was finalized on 6/27/2020 12:00 PM by Dr. Will Hansen MD.       XR Chest 1 View [[id number]] Collected:  06/27/20 1158     Updated:  06/27/20 1200    Narrative:       XR CHEST 1 VW-     CLINICAL INDICATION: dizziness, weakness        COMPARISON: None immediately available      TECHNIQUE: Single frontal view of the chest.     FINDINGS:     There is no focal alveolar infiltrate or effusion.  The cardiac silhouette is normal. The pulmonary vasculature is  unremarkable.  There is no evidence of an acute osseous abnormality.   There are no suspicious-appearing parenchymal soft tissue nodules.          Impression:       No  evidence of active or acute cardiopulmonary disease on today's chest  radiograph.     This report was finalized on 6/27/2020 11:58 AM by Dr. Will Hansen MD.             Test Results Pending at Discharge:      Discharge Disposition/Discharge Medications/Discharge Appointments     Discharge Disposition:   Home or Self Care    Condition at Discharge:  Stable       Discharge Diet:  Diet Instructions     Diet: Regular      Discharge Diet:  Regular          Discharge Activity:  Activity Instructions     Activity as Tolerated            Code Status While Inpatient:  Code Status and Medical Interventions:   Ordered at: 06/27/20 1448     Code Status:    CPR     Medical Interventions (Level of Support Prior to Arrest):    Full       Discharge Medications:     Discharge Medications      New Medications      Instructions Start Date   atorvastatin 10 MG tablet  Commonly known as:  LIPITOR   10 mg, Oral, Daily         Continue These Medications      Instructions Start Date   FLUoxetine 40 MG capsule  Commonly known as:  PROzac   40 mg, Oral, Daily      hydrOXYzine 50 MG tablet  Commonly known as:  ATARAX   50 mg, Oral, 4 Times Daily      metoprolol succinate XL 50 MG 24 hr tablet  Commonly known as:  TOPROL-XL   50 mg, Oral, Daily      QUEtiapine 50 MG tablet  Commonly known as:  SEROquel   50 mg, Oral, Nightly      tiZANidine 4 MG tablet  Commonly known as:  ZANAFLEX   4 mg, Oral, Every 8 Hours PRN      valsartan 80 MG tablet  Commonly known as:  DIOVAN   80 mg, Oral, 2 times daily             Discharge Appointments:      Additional Instructions for the Follow-ups that You Need to Schedule     Discharge Follow-up with PCP   As directed       Currently Documented PCP:    Santi Stewart MD    PCP Phone Number:    [phone]     Follow Up Details:  1 week                   Porfirio Sanchez MD  Hospitalist Service -- [facility]       07/03/20  12:47    Discharge Time:   Please note that this discharge summary required more  than 30 minutes to complete.    Please send a copy of this dictation to the following providers:    Santi Stewart MD

## 2020-07-03 NOTE — PROGRESS NOTES
PROGRESS NOTE         Patient Identification:  Name:  Rani Martins  Age:  49 y.o.  Sex:  female  :  1970  MRN:  [id number]  Visit Number:  [id number]  Primary Care Provider:  Santi Stewart MD         LOS: 6 days       ----------------------------------------------------------------------------------------------------------------------  Subjective       Chief Complaints:    Fall; Weakness - Generalized; and Diarrhea        Interval History:      Patient up and ambulating in room this morning.  She reports she is feeling good this morning.  Denies any nausea vomiting or diarrhea.  Afebrile.  WBC normal.  CRP improving at 4.76.  Stable off of antibiotics.    Review of Systems:    Constitutional: no fever, chills and night sweats. No appetite change or unexpected weight change. No fatigue.  Eyes: no eye drainage, itching or redness.  HEENT: no mouth sores, dysphagia or nose bleed.  Respiratory: no for shortness of breath, cough or production of sputum.  Cardiovascular: no chest pain, no palpitations, no orthopnea.  Gastrointestinal: no nausea, vomiting or diarrhea. No abdominal pain, hematemesis or rectal bleeding.  Genitourinary: no dysuria or polyuria.  Hematologic/lymphatic: no lymph node abnormalities, no easy bruising or easy bleeding.  Musculoskeletal: no muscle or joint pain.  Skin: No rash and no itching.  Neurological: no loss of consciousness, no seizure, no headache.  Behavioral/Psych: no depression or suicidal ideation.  Endocrine: no hot flashes.  Immunologic: negative.  ----------------------------------------------------------------------------------------------------------------------      Objective       [facility] Meds:    enoxaparin 40 mg Subcutaneous Q24H   metoprolol tartrate 50 mg Oral Q12H   polyethylene glycol 17 g Oral Daily   rosuvastatin 10 mg Oral Nightly   sodium chloride 10 mL Intravenous Q12H   sodium chloride 10 mL Intravenous Q12H   sodium phosphate IVPB 15  mmol Intravenous Once   valsartan 40 mg Oral Q24H        ----------------------------------------------------------------------------------------------------------------------    Vital Signs:  Temp:  [98.3 °F (36.8 °C)-98.9 °F (37.2 °C)] 98.5 °F (36.9 °C)  Heart Rate:  [] 74  Resp:  [10-20] 18  BP: (107-169)/(69-97) 162/86  Mean Arterial Pressure (Non-Invasive) for the past 24 hrs (Last 3 readings):   Noninvasive MAP (mmHg)   07/03/20 1147 114   07/03/20 1132 119   07/03/20 1117 126     SpO2 Percentage    07/03/20 1117 07/03/20 1132 07/03/20 1147   SpO2: 97% 94% 94%     SpO2:  [91 %-100 %] 94 %  on  Flow (L/min):  [2-3] 2;   Device (Oxygen Therapy): nasal cannula    Body mass index is 30.27 kg/m².  Wt Readings from Last 3 Encounters:   07/03/20 72.6 kg (160 lb 1.6 oz)   01/10/19 54.4 kg (120 lb)   10/10/18 58.3 kg (128 lb 9.6 oz)        Intake/Output Summary (Last 24 hours) at 7/3/2020 1216  Last data filed at 7/3/2020 1025  Gross per 24 hour   Intake 1375.11 ml   Output 250 ml   Net 1125.11 ml     Diet Regular; GI Soft, [place]  ----------------------------------------------------------------------------------------------------------------------      Physical Exam:    Constitutional:  Well-developed and well-nourished.  No respiratory distress.      HENT:  Head: Normocephalic and atraumatic.  Mouth:  Moist mucous membranes.    Eyes:  Conjunctivae and EOM are normal.  No scleral icterus.  Neck:  Neck supple.  No JVD present.    Cardiovascular:  Normal rate, regular rhythm and normal heart sounds with no murmur. No edema.  Pulmonary/Chest:  No respiratory distress, no wheezes, no crackles, with normal breath sounds and good air movement.  Abdominal: Left lower quadrant tenderness improved.  Soft.  Bowel sounds are normal.  No distension.  No guarding or rebound tenderness.  Musculoskeletal:  No edema, no tenderness, and no deformity.  No swelling or redness of joints.  Neurological:  Alert and oriented to person,  place, and time.  No facial droop.  No slurred speech.   Skin:  Skin is warm and dry.  No rash noted.  No pallor.   Psychiatric:  Normal mood and affect.  Behavior is normal.       ----------------------------------------------------------------------------------------------------------------------  Results from last 7 days   Lab Units 07/02/20  1306 06/30/20 2014 06/30/20  1435  06/27/20  1341   CK TOTAL U/L  --   --   --   --  79   TROPONIN T ng/mL 0.112* 0.084* 0.125*   < > <0.010    < > = values in this interval not displayed.     Results from last 7 days   Lab Units 06/27/20  1110   PROBNP pg/mL 549.7*     Results from last 7 days   Lab Units 07/01/20  0221   CHOLESTEROL mg/dL 162   TRIGLYCERIDES mg/dL 97   HDL CHOL mg/dL 31*   LDL CHOL mg/dL 112*     Results from last 7 days   Lab Units 06/27/20  1133   PH, ARTERIAL pH units 7.420   PO2 ART mm Hg 76.0*   PCO2, ARTERIAL mm Hg 36.9   HCO3 ART mmol/L 23.9     Results from last 7 days   Lab Units 07/03/20  0042 07/02/20  0106 07/01/20  0221  06/27/20  1110   CRP mg/dL 4.76* 6.66* 7.19*   < > 1.31*   LACTATE mmol/L  --   --   --   --  1.5   WBC 10*3/mm3 10.02 12.08* 15.39*   < > 15.86*   HEMOGLOBIN g/dL 10.5* 11.1* 11.9*   < > 13.0   HEMATOCRIT % 33.5* 34.7 36.2   < > 40.0   MCV fL 94.1 93.3 89.6   < > 92.0   MCHC g/dL 31.3* 32.0 32.9   < > 32.5   PLATELETS 10*3/mm3 367 319 371   < > 296    < > = values in this interval not displayed.     Results from last 7 days   Lab Units 07/03/20  0042 07/02/20  0106 07/01/20  0221  06/30/20  0139 06/29/20  0425   SODIUM mmol/L 143 140 137  --  136 134*   POTASSIUM mmol/L 4.4 3.8 4.0   < > 3.5 4.0  4.0   MAGNESIUM mg/dL  --  2.4 1.9  --  2.1 2.4   CHLORIDE mmol/L 108* 110* 105  --  106 105   CO2 mmol/L 23.4 19.4* 16.4*  --  16.5* 12.1*   BUN mg/dL 11 12 10  --  7 4*   CREATININE mg/dL 0.61 0.54* 0.57  --  0.59 0.52*   EGFR IF NONAFRICN AM mL/min/1.73 105 121 114  --  109 126   CALCIUM mg/dL 7.9* 7.9* 8.5*  --  8.6 8.9    GLUCOSE mg/dL 100* 100* 109*  --  133* 121*   ALBUMIN g/dL  --   --  3.12*  --  3.36* 3.17*   BILIRUBIN mg/dL  --   --  0.5  --  0.7 1.0   ALK PHOS U/L  --   --  79  --  96 116   AST (SGOT) U/L  --   --  15  --  24 42*   ALT (SGPT) U/L  --   --  12  --  18 24    < > = values in this interval not displayed.   Estimated Creatinine Clearance: 103.9 mL/min (by C-G formula based on SCr of 0.61 mg/dL).  No results found for: AMMONIA    No results found for: HGBA1C, POCGLU  Lab Results   Component Value Date    HGBA1C 5.00 01/29/2018     Lab Results   Component Value Date    TSH 1.020 06/27/2020    FREET4 0.95 01/19/2016       Blood Culture   Date Value Ref Range Status   06/27/2020 No growth at 3 days  Preliminary   06/27/2020 No growth at 3 days  Preliminary     No results found for: URINECX  No results found for: WOUNDCX  No results found for: STOOLCX  No results found for: RESPCX  Pain Management Panel     Pain Management Panel Latest Ref Rng & Units 6/30/2020 6/27/2020    AMPHETAMINES SCREEN, URINE Negative Negative Negative    BARBITURATES SCREEN Negative Negative Negative    BENZODIAZEPINE SCREEN, URINE Negative Positive(A) Positive(A)    BUPRENORPHINEUR Negative Negative Negative    COCAINE SCREEN, URINE Negative Negative Negative    METHADONE SCREEN, URINE Negative Negative Negative            ----------------------------------------------------------------------------------------------------------------------  Imaging Results (Last 24 Hours)     ** No results found for the last 24 hours. **          ----------------------------------------------------------------------------------------------------------------------    Assessment/Plan       Assessment/Plan     ASSESSMENT:    1.  Sepsis  2.  Colitis     PLAN:     Patient up and ambulating in room this morning.  She reports she is feeling good this morning.  Denies any nausea vomiting or diarrhea.  Afebrile.  WBC normal.  CRP improving at 4.76.  Stable off of  antibiotics.    Negative C. difficile toxin PCR and GI panel.  CT of the abdomen and pelvis on 6/27/2020 shows appearance suggestive of left-sided colitis. CT chest on 6/30/20 revealed bilateral pneumonia which may be bacterial in etiology.     Patient completed course of antibiotic therapy on 7/2/2020. Stable from ID standpoint for discharge home. Will monitor off of coverage.      Code Status:   Code Status and Medical Interventions:   Ordered at: 06/27/20 1448     Code Status:    CPR     Medical Interventions (Level of Support Prior to Arrest):    Full       GABE Urena  07/03/20  12:16

## 2020-07-03 NOTE — PLAN OF CARE
Problem: Patient Care Overview  Goal: Plan of Care Review  7/3/2020 0501 by Coni Patel, RN  Flowsheets (Taken 7/3/2020 0501)  Outcome Summary: Patient still utilizing PRN morphine every 6 hrs. No complaints of nausea/vomiting/ or diarrhea. VSS. No complaints of shortness of breath. Patient has taken off NC and 02 sats have remained above 90%. Patient has been prepped for heart cath this AM, consent signed. Will continue to monitor.  7/3/2020 0459 by Coni Patel, RN  Outcome: Ongoing (interventions implemented as appropriate)  Flowsheets (Taken 7/3/2020 0459)  Plan of Care Reviewed With: patient  Outcome Summary: Patient still utilizing PRN morphine every 6 hrs. No  Goal: Individualization and Mutuality  Outcome: Ongoing (interventions implemented as appropriate)  Goal: Discharge Needs Assessment  Outcome: Ongoing (interventions implemented as appropriate)  Goal: Interprofessional Rounds/Family Conf  Outcome: Ongoing (interventions implemented as appropriate)     Problem: Fall Risk (Adult)  Goal: Identify Related Risk Factors and Signs and Symptoms  Outcome: Outcome(s) achieved     Problem: Pain, Chronic (Adult)  Goal: Identify Related Risk Factors and Signs and Symptoms  Outcome: Ongoing (interventions implemented as appropriate)  Goal: Acceptable Pain/Comfort Level and Functional Ability  Outcome: Ongoing (interventions implemented as appropriate)

## 2020-07-04 ENCOUNTER — READMISSION MANAGEMENT (OUTPATIENT)
Dept: CALL CENTER | Facility: HOSPITAL | Age: 50
End: 2020-07-04

## 2020-07-04 NOTE — OUTREACH NOTE
Prep Survey      Responses   [facility] patient discharged from?  [facility]   Is LACE score < 7 ?  No   Eligibility  Readm Mgmt   Discharge diagnosis  Sepsis with leukocytosis, elevated C-reactive protein, and left-sided colitis   COVID-19 Test Status  Negative   Does the patient have one of the following disease processes/diagnoses(primary or secondary)?  Sepsis   Does the patient have Home health ordered?  No   Is there a DME ordered?  No   Prep survey completed?  Yes          Tash Peraza RN

## 2020-07-04 NOTE — PAYOR COMM NOTE
"[facility]  RADHAMES HERNANDEZ  PHONE  [phone]  FAX  [phone]  NPI:  [id number]    PATIENT D/C 7/3/2020    Rani Brumfield (49 y.o. Female)     Date of Birth Social Security Number Address Home Phone MRN    1970  [id number] [facility] [address] [phone] [id number]    [place] Marital Status          Yarsanism        Admission Date Admission Type Admitting Provider Attending Provider Department, Room/Bed    6/27/20 Emergency Jorje Mckeon DO  [facility] PROGRESS CARE, P207/1P    Discharge Date Discharge Disposition Discharge Destination        7/3/2020 Home or Self Care              Attending Provider:  (none)   Allergies:  Lyrica [Pregabalin], Cymbalta [Duloxetine Hcl], Erythromycin, Lithium, Toradol [Ketorolac Tromethamine], Tramadol, Penicillins    Isolation:  None   Infection:  None   Code Status:  Prior    Ht:  154.9 cm (60.98\")   Wt:  72.6 kg (160 lb 1.6 oz)    Admission Cmt:  None   Principal Problem:  None                Active Insurance as of 6/27/2020     Primary Coverage     Payor Plan Insurance Group Employer/Plan Group    [facility] [place]      Payor Plan Address Payor Plan Phone Number Payor Plan Fax Number Effective Dates    [address] [address]   8/1/2016 - None Entered    [place] [address]       Subscriber Name Subscriber Birth Date Member ID       RANI BRUMFIELD 1970 [id number]                 Emergency Contacts      (Rel.) Home Phone Work Phone Mobile Phone    NathanRossana dumont (Mother) -- -- [phone]              "

## 2020-07-07 ENCOUNTER — READMISSION MANAGEMENT (OUTPATIENT)
Dept: CALL CENTER | Facility: HOSPITAL | Age: 50
End: 2020-07-07

## 2020-07-07 NOTE — OUTREACH NOTE
Sepsis Week 1 Survey      Responses   [facility] patient discharged from?  [facility]   COVID-19 Test Status  Negative   Does the patient have one of the following disease processes/diagnoses(primary or secondary)?  Sepsis   Is there a successful TCM telephone encounter documented?  No   Week 1 attempt successful?  Yes   Call start time  1130   Call end time  1131   Discharge diagnosis  Sepsis with leukocytosis, elevated C-reactive protein, and left-sided colitis   Meds reviewed with patient/caregiver?  Yes   Is the patient having any side effects they believe may be caused by any medication additions or changes?  No   Does the patient have all medications related to this admission filled (includes all antibiotics, inhalers, nebulizers,steroids,etc.)  Yes   Is the patient taking all medications as directed (includes completed medication regime)?  Yes   Does the patient have a primary care provider?   Yes   Does the patient have an appointment with their PCP within 7 days of discharge?  Yes   Has the patient kept scheduled appointments due by today?  Yes   Has home health visited the patient within 72 hours of discharge?  Yes   Has all DME been delivered?  No   Pulse Ox monitoring  None   Psychosocial issues?  No   Did the patient receive a copy of their discharge instructions?  Yes   Nursing interventions  Reviewed instructions with patient   What is the patient's perception of their health status since discharge?  Returned to baseline/stable   Nursing interventions  Nurse provided patient education   Is the patient/caregiver able to teach back Sepsis?  S - Shivering,fever or very cold, P - Pale or discolored skin, S - Sleepy, difficult to arouse,confused   Nursing interventions  Nurse provided reassurance to patient, Nurse provided patient education   Is patient/caregiver able to teach back steps to recovery at home?  Set small, achievable goals for return to baseline health, Rest and regain strength   Is the  patient/caregiver able to teach back signs and symptoms of worsening condition:  Fever, Hyperthermia   Is the patient/caregiver able to teach back the hierarchy of who to call/visit for symptoms/problems? PCP, Specialist, Home health nurse, Urgent Care, ED, 911  Yes   Week 1 call completed?  Yes   Revoked  No further contact(revokes)-requires comment   Graduated/Revoked comments  Baseline          Jey Delgado, RN

## 2021-06-13 ENCOUNTER — APPOINTMENT (OUTPATIENT)
Dept: GENERAL RADIOLOGY | Facility: HOSPITAL | Age: 51
End: 2021-06-13

## 2021-06-13 ENCOUNTER — HOSPITAL ENCOUNTER (EMERGENCY)
Facility: HOSPITAL | Age: 51
Discharge: HOME | End: 2021-06-13
Attending: EMERGENCY MEDICINE | Admitting: EMERGENCY MEDICINE

## 2021-06-13 VITALS
OXYGEN SATURATION: 97 % | BODY MASS INDEX: 27.38 KG/M2 | HEIGHT: 61 IN | RESPIRATION RATE: 20 BRPM | WEIGHT: 145 LBS | TEMPERATURE: 97.3 F | DIASTOLIC BLOOD PRESSURE: 110 MMHG | HEART RATE: 86 BPM | SYSTOLIC BLOOD PRESSURE: 195 MMHG

## 2021-06-13 DIAGNOSIS — S67.21XA CRUSHING INJURY OF RIGHT HAND, INITIAL ENCOUNTER: Primary | ICD-10-CM

## 2021-06-13 PROCEDURE — 73130 X-RAY EXAM OF HAND: CPT

## 2021-06-13 PROCEDURE — 73090 X-RAY EXAM OF FOREARM: CPT

## 2021-06-13 PROCEDURE — 99283 EMERGENCY DEPT VISIT LOW MDM: CPT

## 2021-06-13 RX ORDER — CLONIDINE HYDROCHLORIDE 0.1 MG/1
0.1 TABLET ORAL ONCE
Status: COMPLETED | OUTPATIENT
Start: 2021-06-13 | End: 2021-06-13

## 2021-06-13 RX ORDER — METHOCARBAMOL 500 MG/1
500 TABLET, FILM COATED ORAL 4 TIMES DAILY
Qty: 40 TABLET | Refills: 0 | Status: SHIPPED | OUTPATIENT
Start: 2021-06-13 | End: 2021-08-25

## 2021-06-13 RX ORDER — HYDROCODONE BITARTRATE AND ACETAMINOPHEN 5; 325 MG/1; MG/1
1 TABLET ORAL ONCE
Status: COMPLETED | OUTPATIENT
Start: 2021-06-13 | End: 2021-06-13

## 2021-06-13 RX ADMIN — HYDROCODONE BITARTRATE AND ACETAMINOPHEN 1 TABLET: 5; 325 TABLET ORAL at 17:13

## 2021-06-13 RX ADMIN — CLONIDINE HYDROCHLORIDE 0.1 MG: 0.1 TABLET ORAL at 17:56

## 2021-06-13 NOTE — ED PROVIDER NOTES
Subjective     History provided by:  Patient  Hand Injury  Location:  Hand and arm  Arm location:  R forearm  Hand location:  Dorsum of R hand  Injury: yes    Time since incident:  2 days  Mechanism of injury: crush    Crush injury:     Mechanism:  Door  Pain details:     Quality:  Aching and throbbing    Radiates to:  Does not radiate    Severity:  Moderate    Onset quality:  Sudden    Timing:  Constant    Progression:  Worsening  Foreign body present:  No foreign bodies  Relieved by:  Nothing  Ineffective treatments:  Acetaminophen  Associated symptoms: swelling    Associated symptoms: no fever        Review of Systems   Constitutional: Negative.  Negative for fever.   HENT: Negative.    Respiratory: Negative.    Cardiovascular: Negative.  Negative for chest pain.   Gastrointestinal: Negative.  Negative for abdominal pain.   Endocrine: Negative.    Genitourinary: Negative.  Negative for dysuria.   Musculoskeletal: Positive for arthralgias.   Skin: Negative.    Neurological: Negative.    Psychiatric/Behavioral: Negative.    All other systems reviewed and are negative.      Past Medical History:   Diagnosis Date   • Anxiety    • Bipolar disorder (CMS/HCC)    • Depression    • Fibromyalgia        Allergies   Allergen Reactions   • Lyrica [Pregabalin]    • Cymbalta [Duloxetine Hcl]    • Erythromycin    • Lithium    • Toradol [Ketorolac Tromethamine]    • Tramadol    • Penicillins Rash     Pt received Cefepime and ceftriaxone before         Past Surgical History:   Procedure Laterality Date   • BREAST BIOPSY Left 1997    benign   • CARDIAC CATHETERIZATION N/A 7/3/2020    Procedure: Left Heart Cath;  Surgeon: Ludmila Murray MD;  Location: [facility] CATH INVASIVE LOCATION;  Service: Cardiovascular;  Laterality: N/A;   • HYSTERECTOMY      30       Family History   Problem Relation Age of Onset   • Throat cancer Father    • Heart disease Mother    • Anxiety disorder Mother    • Depression Mother    • Breast cancer Neg Hx         Social History     Socioeconomic History   • Marital status:      Spouse name: Not on file   • Number of children: Not on file   • Years of education: Not on file   • Highest education level: Not on file   Tobacco Use   • Smoking status: Never Smoker   • Smokeless tobacco: Never Used   Substance and Sexual Activity   • Alcohol use: No   • Drug use: No   • Sexual activity: Defer           Objective   Physical Exam  Vitals and nursing note reviewed.   Constitutional:       General: She is not in acute distress.     Appearance: She is well-developed. She is not diaphoretic.   HENT:      Head: Normocephalic and atraumatic.      Right Ear: External ear normal.      Left Ear: External ear normal.      Nose: Nose normal.   Eyes:      Conjunctiva/sclera: Conjunctivae normal.   Neck:      Vascular: No JVD.      Trachea: No tracheal deviation.   Cardiovascular:      Rate and Rhythm: Normal rate and regular rhythm.      Heart sounds: No murmur heard.     Pulmonary:      Effort: Pulmonary effort is normal. No respiratory distress.      Breath sounds: No wheezing.   Abdominal:      Palpations: Abdomen is soft.      Tenderness: There is no abdominal tenderness.   Musculoskeletal:         General: Tenderness and signs of injury present. No deformity.      Cervical back: Normal range of motion and neck supple.      Comments: Bruising and swelling of the right distal forearm and right hand.  Localized tenderness.    Skin:     General: Skin is warm and dry.      Coloration: Skin is not pale.      Findings: No erythema or rash.   Neurological:      Mental Status: She is alert and oriented to person, place, and time.      Cranial Nerves: No cranial nerve deficit.   Psychiatric:         Behavior: Behavior normal.         Thought Content: Thought content normal.         Procedures           ED Course  ED Course as of Jun 13 1752   Sun Jun 13, 2021   1747 XR hand rad interpreted:  No acute fracture or subluxation. No  definite acute findings.       [RB]   1747 XR rad interpreted:  Negative right forearm.    [RB]      ED Course User Index  [RB] Mil Gómez II, PA                                           MDM  Number of Diagnoses or Management Options  Crushing injury of right hand, initial encounter: new and requires workup     Amount and/or Complexity of Data Reviewed  Tests in the radiology section of CPT®: ordered and reviewed    Risk of Complications, Morbidity, and/or Mortality  Presenting problems: low  Diagnostic procedures: low  Management options: low    Patient Progress  Patient progress: stable      Final diagnoses:   Crushing injury of right hand, initial encounter       ED Disposition  ED Disposition     ED Disposition Condition Comment    Discharge Stable           Jose Raul Dunn MD  [address] [facility]   [facility] [address] [phone]    Schedule an appointment as soon as possible for a visit            Medication List      New Prescriptions    methocarbamol 500 MG tablet  Commonly known as: ROBAXIN  Take 1 tablet by mouth 4 (Four) Times a Day.           Where to Get Your Medications      You can get these medications from any pharmacy    Bring a paper prescription for each of these medications  · methocarbamol 500 MG tablet          Mil Gómez II, PA  06/13/21 0319

## 2021-08-25 ENCOUNTER — APPOINTMENT (OUTPATIENT)
Dept: CT IMAGING | Facility: HOSPITAL | Age: 51
End: 2021-08-25

## 2021-08-25 ENCOUNTER — HOSPITAL ENCOUNTER (INPATIENT)
Facility: HOSPITAL | Age: 51
LOS: 2 days | Discharge: HOME | End: 2021-08-27
Attending: EMERGENCY MEDICINE | Admitting: STUDENT IN AN ORGANIZED HEALTH CARE EDUCATION/TRAINING PROGRAM

## 2021-08-25 ENCOUNTER — APPOINTMENT (OUTPATIENT)
Dept: GENERAL RADIOLOGY | Facility: HOSPITAL | Age: 51
End: 2021-08-25

## 2021-08-25 DIAGNOSIS — E87.6 HYPOKALEMIA: ICD-10-CM

## 2021-08-25 DIAGNOSIS — E03.9 HYPOTHYROIDISM, UNSPECIFIED TYPE: ICD-10-CM

## 2021-08-25 DIAGNOSIS — F11.10: ICD-10-CM

## 2021-08-25 DIAGNOSIS — F12.10 MARIJUANA ABUSE: ICD-10-CM

## 2021-08-25 DIAGNOSIS — E83.42 HYPOMAGNESEMIA: Primary | ICD-10-CM

## 2021-08-25 PROBLEM — R00.1 SYMPTOMATIC BRADYCARDIA: Status: ACTIVE | Noted: 2021-08-25

## 2021-08-25 LAB
6-ACETYL MORPHINE: NEGATIVE
ALBUMIN SERPL-MCNC: 2.7 G/DL (ref 3.5–5.2)
ALBUMIN/GLOB SERPL: 1.6 G/DL
ALP SERPL-CCNC: 71 U/L (ref 39–117)
ALT SERPL W P-5'-P-CCNC: 6 U/L (ref 1–33)
AMPHET+METHAMPHET UR QL: NEGATIVE
AMYLASE SERPL-CCNC: 12 U/L (ref 28–100)
ANION GAP SERPL CALCULATED.3IONS-SCNC: 8.5 MMOL/L (ref 5–15)
APTT PPP: 34.4 SECONDS (ref 25.5–35.4)
AST SERPL-CCNC: 8 U/L (ref 1–32)
BACTERIA UR QL AUTO: NORMAL /HPF
BARBITURATES UR QL SCN: NEGATIVE
BASOPHILS # BLD AUTO: 0.06 10*3/MM3 (ref 0–0.2)
BASOPHILS NFR BLD AUTO: 0.8 % (ref 0–1.5)
BENZODIAZ UR QL SCN: NEGATIVE
BILIRUB SERPL-MCNC: 0.2 MG/DL (ref 0–1.2)
BILIRUB UR QL STRIP: NEGATIVE
BUN SERPL-MCNC: 7 MG/DL (ref 6–20)
BUN/CREAT SERPL: 12.3 (ref 7–25)
BUPRENORPHINE SERPL-MCNC: NEGATIVE NG/ML
CA-I SERPL ISE-MCNC: 1.24 MMOL/L (ref 1.12–1.32)
CALCIUM SPEC-SCNC: 6.2 MG/DL (ref 8.6–10.5)
CANNABINOIDS SERPL QL: POSITIVE
CHLORIDE SERPL-SCNC: 115 MMOL/L (ref 98–107)
CLARITY UR: CLEAR
CO2 SERPL-SCNC: 19.5 MMOL/L (ref 22–29)
COCAINE UR QL: NEGATIVE
COLOR UR: YELLOW
CREAT SERPL-MCNC: 0.57 MG/DL (ref 0.57–1)
CRP SERPL-MCNC: <0.3 MG/DL (ref 0–0.5)
D-LACTATE SERPL-SCNC: 1 MMOL/L (ref 0.5–2)
DEPRECATED RDW RBC AUTO: 41.9 FL (ref 37–54)
EOSINOPHIL # BLD AUTO: 0.63 10*3/MM3 (ref 0–0.4)
EOSINOPHIL NFR BLD AUTO: 7.9 % (ref 0.3–6.2)
ERYTHROCYTE [DISTWIDTH] IN BLOOD BY AUTOMATED COUNT: 13.2 % (ref 12.3–15.4)
ETHANOL BLD-MCNC: <10 MG/DL (ref 0–10)
ETHANOL UR QL: <0.01 %
FLUAV SUBTYP SPEC NAA+PROBE: NOT DETECTED
FLUBV RNA ISLT QL NAA+PROBE: NOT DETECTED
GFR SERPL CREATININE-BSD FRML MDRD: 113 ML/MIN/1.73
GLOBULIN UR ELPH-MCNC: 1.7 GM/DL
GLUCOSE SERPL-MCNC: 80 MG/DL (ref 65–99)
GLUCOSE UR STRIP-MCNC: NEGATIVE MG/DL
HCT VFR BLD AUTO: 41.6 % (ref 34–46.6)
HGB BLD-MCNC: 13.6 G/DL (ref 12–15.9)
HGB UR QL STRIP.AUTO: NEGATIVE
HOLD SPECIMEN: NORMAL
HOLD SPECIMEN: NORMAL
HYALINE CASTS UR QL AUTO: NORMAL /LPF
IMM GRANULOCYTES # BLD AUTO: 0.01 10*3/MM3 (ref 0–0.05)
IMM GRANULOCYTES NFR BLD AUTO: 0.1 % (ref 0–0.5)
INR PPP: 0.94 (ref 0.9–1.1)
KETONES UR QL STRIP: NEGATIVE
LEUKOCYTE ESTERASE UR QL STRIP.AUTO: ABNORMAL
LIPASE SERPL-CCNC: 11 U/L (ref 13–60)
LYMPHOCYTES # BLD AUTO: 3.32 10*3/MM3 (ref 0.7–3.1)
LYMPHOCYTES NFR BLD AUTO: 41.6 % (ref 19.6–45.3)
MAGNESIUM SERPL-MCNC: 1.3 MG/DL (ref 1.6–2.6)
MCH RBC QN AUTO: 28.5 PG (ref 26.6–33)
MCHC RBC AUTO-ENTMCNC: 32.7 G/DL (ref 31.5–35.7)
MCV RBC AUTO: 87 FL (ref 79–97)
METHADONE UR QL SCN: NEGATIVE
MONOCYTES # BLD AUTO: 0.65 10*3/MM3 (ref 0.1–0.9)
MONOCYTES NFR BLD AUTO: 8.1 % (ref 5–12)
NEUTROPHILS NFR BLD AUTO: 3.31 10*3/MM3 (ref 1.7–7)
NEUTROPHILS NFR BLD AUTO: 41.5 % (ref 42.7–76)
NITRITE UR QL STRIP: NEGATIVE
NRBC BLD AUTO-RTO: 0 /100 WBC (ref 0–0.2)
OPIATES UR QL: NEGATIVE
OXYCODONE UR QL SCN: POSITIVE
PCP UR QL SCN: NEGATIVE
PH UR STRIP.AUTO: 6 [PH] (ref 5–8)
PLATELET # BLD AUTO: 336 10*3/MM3 (ref 140–450)
PMV BLD AUTO: 10.7 FL (ref 6–12)
POTASSIUM SERPL-SCNC: 2.8 MMOL/L (ref 3.5–5.2)
POTASSIUM SERPL-SCNC: 4.6 MMOL/L (ref 3.5–5.2)
PROT SERPL-MCNC: 4.4 G/DL (ref 6–8.5)
PROT UR QL STRIP: NEGATIVE
PROTHROMBIN TIME: 13 SECONDS (ref 12.8–14.5)
QT INTERVAL: 566 MS
QTC INTERVAL: 461 MS
RBC # BLD AUTO: 4.78 10*6/MM3 (ref 3.77–5.28)
RBC # UR: NORMAL /HPF
REF LAB TEST METHOD: NORMAL
SARS-COV-2 RNA PNL SPEC NAA+PROBE: NOT DETECTED
SODIUM SERPL-SCNC: 143 MMOL/L (ref 136–145)
SP GR UR STRIP: 1.01 (ref 1–1.03)
SQUAMOUS #/AREA URNS HPF: NORMAL /HPF
TROPONIN T SERPL-MCNC: <0.01 NG/ML (ref 0–0.03)
TROPONIN T SERPL-MCNC: <0.01 NG/ML (ref 0–0.03)
TSH SERPL DL<=0.05 MIU/L-ACNC: 7.17 UIU/ML (ref 0.27–4.2)
UROBILINOGEN UR QL STRIP: ABNORMAL
WBC # BLD AUTO: 7.98 10*3/MM3 (ref 3.4–10.8)
WBC UR QL AUTO: NORMAL /HPF
WHOLE BLOOD HOLD SPECIMEN: NORMAL
WHOLE BLOOD HOLD SPECIMEN: NORMAL

## 2021-08-25 PROCEDURE — 85025 COMPLETE CBC W/AUTO DIFF WBC: CPT | Performed by: PHYSICIAN ASSISTANT

## 2021-08-25 PROCEDURE — 83690 ASSAY OF LIPASE: CPT | Performed by: PHYSICIAN ASSISTANT

## 2021-08-25 PROCEDURE — 80307 DRUG TEST PRSMV CHEM ANLYZR: CPT | Performed by: PHYSICIAN ASSISTANT

## 2021-08-25 PROCEDURE — 93010 ELECTROCARDIOGRAM REPORT: CPT | Performed by: INTERNAL MEDICINE

## 2021-08-25 PROCEDURE — 84484 ASSAY OF TROPONIN QUANT: CPT | Performed by: INTERNAL MEDICINE

## 2021-08-25 PROCEDURE — 85610 PROTHROMBIN TIME: CPT | Performed by: PHYSICIAN ASSISTANT

## 2021-08-25 PROCEDURE — 84443 ASSAY THYROID STIM HORMONE: CPT | Performed by: PHYSICIAN ASSISTANT

## 2021-08-25 PROCEDURE — 70450 CT HEAD/BRAIN W/O DYE: CPT

## 2021-08-25 PROCEDURE — 25010000003 POTASSIUM CHLORIDE 10 MEQ/100ML SOLUTION: Performed by: PHYSICIAN ASSISTANT

## 2021-08-25 PROCEDURE — 99284 EMERGENCY DEPT VISIT MOD MDM: CPT

## 2021-08-25 PROCEDURE — 87636 SARSCOV2 & INF A&B AMP PRB: CPT | Performed by: PHYSICIAN ASSISTANT

## 2021-08-25 PROCEDURE — 71045 X-RAY EXAM CHEST 1 VIEW: CPT | Performed by: RADIOLOGY

## 2021-08-25 PROCEDURE — 81001 URINALYSIS AUTO W/SCOPE: CPT | Performed by: PHYSICIAN ASSISTANT

## 2021-08-25 PROCEDURE — 25010000003 MAGNESIUM SULFATE 4 GM/100ML SOLUTION: Performed by: PHYSICIAN ASSISTANT

## 2021-08-25 PROCEDURE — 80053 COMPREHEN METABOLIC PANEL: CPT | Performed by: PHYSICIAN ASSISTANT

## 2021-08-25 PROCEDURE — 87040 BLOOD CULTURE FOR BACTERIA: CPT | Performed by: PHYSICIAN ASSISTANT

## 2021-08-25 PROCEDURE — 93005 ELECTROCARDIOGRAM TRACING: CPT | Performed by: INTERNAL MEDICINE

## 2021-08-25 PROCEDURE — 84484 ASSAY OF TROPONIN QUANT: CPT | Performed by: PHYSICIAN ASSISTANT

## 2021-08-25 PROCEDURE — 93005 ELECTROCARDIOGRAM TRACING: CPT | Performed by: PHYSICIAN ASSISTANT

## 2021-08-25 PROCEDURE — 25010000002 ENOXAPARIN PER 10 MG: Performed by: STUDENT IN AN ORGANIZED HEALTH CARE EDUCATION/TRAINING PROGRAM

## 2021-08-25 PROCEDURE — 83735 ASSAY OF MAGNESIUM: CPT | Performed by: PHYSICIAN ASSISTANT

## 2021-08-25 PROCEDURE — 71045 X-RAY EXAM CHEST 1 VIEW: CPT

## 2021-08-25 PROCEDURE — 99223 1ST HOSP IP/OBS HIGH 75: CPT | Performed by: STUDENT IN AN ORGANIZED HEALTH CARE EDUCATION/TRAINING PROGRAM

## 2021-08-25 PROCEDURE — 82150 ASSAY OF AMYLASE: CPT | Performed by: PHYSICIAN ASSISTANT

## 2021-08-25 PROCEDURE — 82330 ASSAY OF CALCIUM: CPT | Performed by: STUDENT IN AN ORGANIZED HEALTH CARE EDUCATION/TRAINING PROGRAM

## 2021-08-25 PROCEDURE — 83605 ASSAY OF LACTIC ACID: CPT | Performed by: PHYSICIAN ASSISTANT

## 2021-08-25 PROCEDURE — 85730 THROMBOPLASTIN TIME PARTIAL: CPT | Performed by: PHYSICIAN ASSISTANT

## 2021-08-25 PROCEDURE — 70450 CT HEAD/BRAIN W/O DYE: CPT | Performed by: RADIOLOGY

## 2021-08-25 PROCEDURE — 82077 ASSAY SPEC XCP UR&BREATH IA: CPT | Performed by: PHYSICIAN ASSISTANT

## 2021-08-25 PROCEDURE — 84132 ASSAY OF SERUM POTASSIUM: CPT | Performed by: EMERGENCY MEDICINE

## 2021-08-25 PROCEDURE — 86140 C-REACTIVE PROTEIN: CPT | Performed by: PHYSICIAN ASSISTANT

## 2021-08-25 RX ORDER — SODIUM CHLORIDE 0.9 % (FLUSH) 0.9 %
10 SYRINGE (ML) INJECTION AS NEEDED
Status: DISCONTINUED | OUTPATIENT
Start: 2021-08-25 | End: 2021-08-27 | Stop reason: HOSPADM

## 2021-08-25 RX ORDER — ACETAMINOPHEN 325 MG/1
650 TABLET ORAL EVERY 6 HOURS PRN
Status: DISCONTINUED | OUTPATIENT
Start: 2021-08-25 | End: 2021-08-27 | Stop reason: HOSPADM

## 2021-08-25 RX ORDER — CLONIDINE HYDROCHLORIDE 0.1 MG/1
0.1 TABLET ORAL DAILY PRN
Status: CANCELLED | OUTPATIENT
Start: 2021-08-25

## 2021-08-25 RX ORDER — TIZANIDINE 4 MG/1
4 TABLET ORAL EVERY 8 HOURS PRN
Status: CANCELLED | OUTPATIENT
Start: 2021-08-25

## 2021-08-25 RX ORDER — POTASSIUM CHLORIDE 7.45 MG/ML
10 INJECTION INTRAVENOUS ONCE
Status: COMPLETED | OUTPATIENT
Start: 2021-08-25 | End: 2021-08-25

## 2021-08-25 RX ORDER — TRAZODONE HYDROCHLORIDE 50 MG/1
50 TABLET ORAL NIGHTLY
COMMUNITY
End: 2021-08-27 | Stop reason: HOSPADM

## 2021-08-25 RX ORDER — POTASSIUM CHLORIDE 1.5 G/1.77G
40 POWDER, FOR SOLUTION ORAL AS NEEDED
Status: DISCONTINUED | OUTPATIENT
Start: 2021-08-25 | End: 2021-08-27 | Stop reason: HOSPADM

## 2021-08-25 RX ORDER — POTASSIUM CHLORIDE 7.45 MG/ML
10 INJECTION INTRAVENOUS
Status: DISCONTINUED | OUTPATIENT
Start: 2021-08-25 | End: 2021-08-27 | Stop reason: HOSPADM

## 2021-08-25 RX ORDER — TRAZODONE HYDROCHLORIDE 50 MG/1
50 TABLET ORAL NIGHTLY
Status: CANCELLED | OUTPATIENT
Start: 2021-08-25

## 2021-08-25 RX ORDER — CLONIDINE HYDROCHLORIDE 0.1 MG/1
0.1 TABLET ORAL DAILY PRN
COMMUNITY
End: 2021-08-27 | Stop reason: HOSPADM

## 2021-08-25 RX ORDER — POTASSIUM CHLORIDE 7.45 MG/ML
10 INJECTION INTRAVENOUS ONCE
Status: DISCONTINUED | OUTPATIENT
Start: 2021-08-25 | End: 2021-08-25

## 2021-08-25 RX ORDER — SODIUM CHLORIDE 0.9 % (FLUSH) 0.9 %
10 SYRINGE (ML) INJECTION EVERY 12 HOURS SCHEDULED
Status: DISCONTINUED | OUTPATIENT
Start: 2021-08-25 | End: 2021-08-27 | Stop reason: HOSPADM

## 2021-08-25 RX ORDER — AMLODIPINE BESYLATE 10 MG/1
10 TABLET ORAL DAILY PRN
COMMUNITY
End: 2021-08-27 | Stop reason: HOSPADM

## 2021-08-25 RX ORDER — MAGNESIUM SULFATE HEPTAHYDRATE 40 MG/ML
4 INJECTION, SOLUTION INTRAVENOUS ONCE
Status: COMPLETED | OUTPATIENT
Start: 2021-08-25 | End: 2021-08-25

## 2021-08-25 RX ORDER — ATROPINE SULFATE 0.4 MG/ML
0.4 AMPUL (ML) INJECTION EVERY 6 HOURS PRN
Status: DISCONTINUED | OUTPATIENT
Start: 2021-08-25 | End: 2021-08-26

## 2021-08-25 RX ORDER — AMLODIPINE BESYLATE 5 MG/1
10 TABLET ORAL DAILY PRN
Status: CANCELLED | OUTPATIENT
Start: 2021-08-25

## 2021-08-25 RX ORDER — POTASSIUM CHLORIDE 750 MG/1
40 CAPSULE, EXTENDED RELEASE ORAL AS NEEDED
Status: DISCONTINUED | OUTPATIENT
Start: 2021-08-25 | End: 2021-08-27 | Stop reason: HOSPADM

## 2021-08-25 RX ADMIN — POTASSIUM CHLORIDE 10 MEQ: 7.46 INJECTION, SOLUTION INTRAVENOUS at 14:28

## 2021-08-25 RX ADMIN — POTASSIUM CHLORIDE 10 MEQ: 7.46 INJECTION, SOLUTION INTRAVENOUS at 19:05

## 2021-08-25 RX ADMIN — POTASSIUM CHLORIDE 10 MEQ: 7.46 INJECTION, SOLUTION INTRAVENOUS at 16:48

## 2021-08-25 RX ADMIN — SODIUM CHLORIDE 1974 ML: 9 INJECTION, SOLUTION INTRAVENOUS at 12:48

## 2021-08-25 RX ADMIN — ENOXAPARIN SODIUM 40 MG: 40 INJECTION SUBCUTANEOUS at 19:05

## 2021-08-25 RX ADMIN — MAGNESIUM SULFATE HEPTAHYDRATE 4 G: 40 INJECTION, SOLUTION INTRAVENOUS at 14:44

## 2021-08-25 RX ADMIN — ACETAMINOPHEN 650 MG: 325 TABLET ORAL at 20:26

## 2021-08-25 RX ADMIN — POTASSIUM CHLORIDE 10 MEQ: 7.46 INJECTION, SOLUTION INTRAVENOUS at 15:37

## 2021-08-26 ENCOUNTER — APPOINTMENT (OUTPATIENT)
Dept: CARDIOLOGY | Facility: HOSPITAL | Age: 51
End: 2021-08-26

## 2021-08-26 LAB
A-A DO2: 23.7 MMHG (ref 0–300)
ALBUMIN SERPL-MCNC: 3.92 G/DL (ref 3.5–5.2)
ALBUMIN/GLOB SERPL: 1.3 G/DL
ALP SERPL-CCNC: 118 U/L (ref 39–117)
ALT SERPL W P-5'-P-CCNC: 9 U/L (ref 1–33)
ANION GAP SERPL CALCULATED.3IONS-SCNC: 13.1 MMOL/L (ref 5–15)
ARTERIAL PATENCY WRIST A: POSITIVE
AST SERPL-CCNC: 14 U/L (ref 1–32)
ATMOSPHERIC PRESS: 731 MMHG
BASE EXCESS BLDA CALC-SCNC: -0.8 MMOL/L (ref 0–2)
BASOPHILS # BLD AUTO: 0.04 10*3/MM3 (ref 0–0.2)
BASOPHILS NFR BLD AUTO: 0.6 % (ref 0–1.5)
BDY SITE: ABNORMAL
BH CV ECHO MEAS - % IVS THICK: 1.8 %
BH CV ECHO MEAS - % LVPW THICK: 10.5 %
BH CV ECHO MEAS - ACS: 1.9 CM
BH CV ECHO MEAS - AO MAX PG: 10.4 MMHG
BH CV ECHO MEAS - AO MEAN PG: 5 MMHG
BH CV ECHO MEAS - AO ROOT AREA (BSA CORRECTED): 1.5
BH CV ECHO MEAS - AO ROOT AREA: 6.2 CM^2
BH CV ECHO MEAS - AO ROOT DIAM: 2.8 CM
BH CV ECHO MEAS - AO V2 MAX: 161 CM/SEC
BH CV ECHO MEAS - AO V2 MEAN: 105 CM/SEC
BH CV ECHO MEAS - AO V2 VTI: 37.5 CM
BH CV ECHO MEAS - BSA(HAYCOCK): 1.9 M^2
BH CV ECHO MEAS - BSA: 1.8 M^2
BH CV ECHO MEAS - BZI_BMI: 24 KILOGRAMS/M^2
BH CV ECHO MEAS - BZI_METRIC_HEIGHT: 172.7 CM
BH CV ECHO MEAS - BZI_METRIC_WEIGHT: 71.7 KG
BH CV ECHO MEAS - EDV(CUBED): 85.8 ML
BH CV ECHO MEAS - EDV(MOD-SP4): 54.2 ML
BH CV ECHO MEAS - EDV(TEICH): 88.2 ML
BH CV ECHO MEAS - EF(CUBED): 74.7 %
BH CV ECHO MEAS - EF(MOD-SP4): 73.6 %
BH CV ECHO MEAS - EF(TEICH): 66.8 %
BH CV ECHO MEAS - ESV(CUBED): 21.7 ML
BH CV ECHO MEAS - ESV(MOD-SP4): 14.3 ML
BH CV ECHO MEAS - ESV(TEICH): 29.3 ML
BH CV ECHO MEAS - FS: 36.7 %
BH CV ECHO MEAS - IVS/LVPW: 0.99
BH CV ECHO MEAS - IVSD: 0.95 CM
BH CV ECHO MEAS - IVSS: 0.97 CM
BH CV ECHO MEAS - LA DIMENSION: 2.8 CM
BH CV ECHO MEAS - LA/AO: 0.98
BH CV ECHO MEAS - LV DIASTOLIC VOL/BSA (35-75): 29.3 ML/M^2
BH CV ECHO MEAS - LV MASS(C)D: 138.9 GRAMS
BH CV ECHO MEAS - LV MASS(C)DI: 75.1 GRAMS/M^2
BH CV ECHO MEAS - LV MASS(C)S: 75.4 GRAMS
BH CV ECHO MEAS - LV MASS(C)SI: 40.8 GRAMS/M^2
BH CV ECHO MEAS - LV SYSTOLIC VOL/BSA (12-30): 7.7 ML/M^2
BH CV ECHO MEAS - LVIDD: 4.4 CM
BH CV ECHO MEAS - LVIDS: 2.8 CM
BH CV ECHO MEAS - LVLD AP4: 5.4 CM
BH CV ECHO MEAS - LVLS AP4: 3.7 CM
BH CV ECHO MEAS - LVOT AREA (M): 2.8 CM^2
BH CV ECHO MEAS - LVOT AREA: 2.8 CM^2
BH CV ECHO MEAS - LVOT DIAM: 1.9 CM
BH CV ECHO MEAS - LVPWD: 0.96 CM
BH CV ECHO MEAS - LVPWS: 1.1 CM
BH CV ECHO MEAS - MV A MAX VEL: 73.2 CM/SEC
BH CV ECHO MEAS - MV E MAX VEL: 86.8 CM/SEC
BH CV ECHO MEAS - MV E/A: 1.2
BH CV ECHO MEAS - PA ACC TIME: 0.12 SEC
BH CV ECHO MEAS - PA PR(ACCEL): 25 MMHG
BH CV ECHO MEAS - RAP SYSTOLE: 10 MMHG
BH CV ECHO MEAS - RVSP: 35.2 MMHG
BH CV ECHO MEAS - SI(AO): 124.9 ML/M^2
BH CV ECHO MEAS - SI(CUBED): 34.6 ML/M^2
BH CV ECHO MEAS - SI(MOD-SP4): 21.6 ML/M^2
BH CV ECHO MEAS - SI(TEICH): 31.8 ML/M^2
BH CV ECHO MEAS - SV(AO): 230.9 ML
BH CV ECHO MEAS - SV(CUBED): 64 ML
BH CV ECHO MEAS - SV(MOD-SP4): 39.9 ML
BH CV ECHO MEAS - SV(TEICH): 58.9 ML
BH CV ECHO MEAS - TR MAX VEL: 251 CM/SEC
BILIRUB SERPL-MCNC: 0.7 MG/DL (ref 0–1.2)
BODY TEMPERATURE: 0 C
BUN SERPL-MCNC: 9 MG/DL (ref 6–20)
BUN/CREAT SERPL: 11.8 (ref 7–25)
CALCIUM SPEC-SCNC: 9.3 MG/DL (ref 8.6–10.5)
CHLORIDE SERPL-SCNC: 103 MMOL/L (ref 98–107)
CO2 BLDA-SCNC: 24.6 MMOL/L (ref 22–33)
CO2 SERPL-SCNC: 19.9 MMOL/L (ref 22–29)
COHGB MFR BLD: 1.1 % (ref 0–5)
CREAT SERPL-MCNC: 0.76 MG/DL (ref 0.57–1)
DEPRECATED RDW RBC AUTO: 39.5 FL (ref 37–54)
EOSINOPHIL # BLD AUTO: 0.5 10*3/MM3 (ref 0–0.4)
EOSINOPHIL NFR BLD AUTO: 6.9 % (ref 0.3–6.2)
ERYTHROCYTE [DISTWIDTH] IN BLOOD BY AUTOMATED COUNT: 12.9 % (ref 12.3–15.4)
GFR SERPL CREATININE-BSD FRML MDRD: 81 ML/MIN/1.73
GLOBULIN UR ELPH-MCNC: 3 GM/DL
GLUCOSE SERPL-MCNC: 102 MG/DL (ref 65–99)
HCO3 BLDA-SCNC: 23.5 MMOL/L (ref 20–26)
HCT VFR BLD AUTO: 39.1 % (ref 34–46.6)
HCT VFR BLD CALC: 41 % (ref 38–51)
HGB BLD-MCNC: 13 G/DL (ref 12–15.9)
HGB BLDA-MCNC: 13.4 G/DL (ref 13.5–17.5)
IMM GRANULOCYTES # BLD AUTO: 0.01 10*3/MM3 (ref 0–0.05)
IMM GRANULOCYTES NFR BLD AUTO: 0.1 % (ref 0–0.5)
INHALED O2 CONCENTRATION: 21 %
LYMPHOCYTES # BLD AUTO: 1.93 10*3/MM3 (ref 0.7–3.1)
LYMPHOCYTES NFR BLD AUTO: 26.7 % (ref 19.6–45.3)
Lab: ABNORMAL
MAGNESIUM SERPL-MCNC: 2.4 MG/DL (ref 1.6–2.6)
MAXIMAL PREDICTED HEART RATE: 172 BPM
MCH RBC QN AUTO: 28.3 PG (ref 26.6–33)
MCHC RBC AUTO-ENTMCNC: 33.2 G/DL (ref 31.5–35.7)
MCV RBC AUTO: 85.2 FL (ref 79–97)
METHGB BLD QL: 0.3 % (ref 0–3)
MODALITY: ABNORMAL
MONOCYTES # BLD AUTO: 0.4 10*3/MM3 (ref 0.1–0.9)
MONOCYTES NFR BLD AUTO: 5.5 % (ref 5–12)
NEUTROPHILS NFR BLD AUTO: 4.34 10*3/MM3 (ref 1.7–7)
NEUTROPHILS NFR BLD AUTO: 60.2 % (ref 42.7–76)
NOTE: ABNORMAL
NRBC BLD AUTO-RTO: 0 /100 WBC (ref 0–0.2)
OXYHGB MFR BLDV: 95.5 % (ref 94–99)
PCO2 BLDA: 36.9 MM HG (ref 35–45)
PCO2 TEMP ADJ BLD: ABNORMAL MM[HG]
PH BLDA: 7.41 PH UNITS (ref 7.35–7.45)
PH, TEMP CORRECTED: ABNORMAL
PLATELET # BLD AUTO: 292 10*3/MM3 (ref 140–450)
PMV BLD AUTO: 10.3 FL (ref 6–12)
PO2 BLDA: 78.3 MM HG (ref 83–108)
PO2 TEMP ADJ BLD: ABNORMAL MM[HG]
POTASSIUM SERPL-SCNC: 3.9 MMOL/L (ref 3.5–5.2)
POTASSIUM SERPL-SCNC: 3.9 MMOL/L (ref 3.5–5.2)
PROT SERPL-MCNC: 6.9 G/DL (ref 6–8.5)
RBC # BLD AUTO: 4.59 10*6/MM3 (ref 3.77–5.28)
SAO2 % BLDCOA: 96.9 % (ref 94–99)
SODIUM SERPL-SCNC: 136 MMOL/L (ref 136–145)
STRESS TARGET HR: 146 BPM
TROPONIN T SERPL-MCNC: <0.01 NG/ML (ref 0–0.03)
VENTILATOR MODE: ABNORMAL
WBC # BLD AUTO: 7.22 10*3/MM3 (ref 3.4–10.8)

## 2021-08-26 PROCEDURE — 93005 ELECTROCARDIOGRAM TRACING: CPT | Performed by: STUDENT IN AN ORGANIZED HEALTH CARE EDUCATION/TRAINING PROGRAM

## 2021-08-26 PROCEDURE — 93306 TTE W/DOPPLER COMPLETE: CPT

## 2021-08-26 PROCEDURE — 93010 ELECTROCARDIOGRAM REPORT: CPT | Performed by: INTERNAL MEDICINE

## 2021-08-26 PROCEDURE — 84132 ASSAY OF SERUM POTASSIUM: CPT | Performed by: INTERNAL MEDICINE

## 2021-08-26 PROCEDURE — 99232 SBSQ HOSP IP/OBS MODERATE 35: CPT | Performed by: STUDENT IN AN ORGANIZED HEALTH CARE EDUCATION/TRAINING PROGRAM

## 2021-08-26 PROCEDURE — 85025 COMPLETE CBC W/AUTO DIFF WBC: CPT | Performed by: STUDENT IN AN ORGANIZED HEALTH CARE EDUCATION/TRAINING PROGRAM

## 2021-08-26 PROCEDURE — 83735 ASSAY OF MAGNESIUM: CPT | Performed by: INTERNAL MEDICINE

## 2021-08-26 PROCEDURE — 36600 WITHDRAWAL OF ARTERIAL BLOOD: CPT

## 2021-08-26 PROCEDURE — 93306 TTE W/DOPPLER COMPLETE: CPT | Performed by: SPECIALIST

## 2021-08-26 PROCEDURE — 25010000002 ENOXAPARIN PER 10 MG: Performed by: STUDENT IN AN ORGANIZED HEALTH CARE EDUCATION/TRAINING PROGRAM

## 2021-08-26 PROCEDURE — 0097U HC BIOFIRE FILMARRAY GI PANEL: CPT | Performed by: STUDENT IN AN ORGANIZED HEALTH CARE EDUCATION/TRAINING PROGRAM

## 2021-08-26 PROCEDURE — 80053 COMPREHEN METABOLIC PANEL: CPT | Performed by: STUDENT IN AN ORGANIZED HEALTH CARE EDUCATION/TRAINING PROGRAM

## 2021-08-26 PROCEDURE — 99254 IP/OBS CNSLTJ NEW/EST MOD 60: CPT | Performed by: INTERNAL MEDICINE

## 2021-08-26 PROCEDURE — 25010000002 DICYCLOMINE PER 20 MG: Performed by: STUDENT IN AN ORGANIZED HEALTH CARE EDUCATION/TRAINING PROGRAM

## 2021-08-26 PROCEDURE — 82088 ASSAY OF ALDOSTERONE: CPT | Performed by: INTERNAL MEDICINE

## 2021-08-26 PROCEDURE — 82805 BLOOD GASES W/O2 SATURATION: CPT

## 2021-08-26 PROCEDURE — 84244 ASSAY OF RENIN: CPT | Performed by: INTERNAL MEDICINE

## 2021-08-26 PROCEDURE — 83050 HGB METHEMOGLOBIN QUAN: CPT

## 2021-08-26 PROCEDURE — 82375 ASSAY CARBOXYHB QUANT: CPT

## 2021-08-26 RX ORDER — LOPERAMIDE HYDROCHLORIDE 2 MG/1
4 CAPSULE ORAL 4 TIMES DAILY PRN
Status: DISCONTINUED | OUTPATIENT
Start: 2021-08-26 | End: 2021-08-27 | Stop reason: HOSPADM

## 2021-08-26 RX ORDER — LEVOTHYROXINE SODIUM 0.03 MG/1
25 TABLET ORAL
Status: DISCONTINUED | OUTPATIENT
Start: 2021-08-26 | End: 2021-08-27

## 2021-08-26 RX ORDER — DICYCLOMINE HYDROCHLORIDE 10 MG/ML
20 INJECTION INTRAMUSCULAR 4 TIMES DAILY PRN
Status: DISCONTINUED | OUTPATIENT
Start: 2021-08-26 | End: 2021-08-27 | Stop reason: HOSPADM

## 2021-08-26 RX ORDER — HYDRALAZINE HYDROCHLORIDE 25 MG/1
25 TABLET, FILM COATED ORAL EVERY 8 HOURS SCHEDULED
Status: DISCONTINUED | OUTPATIENT
Start: 2021-08-26 | End: 2021-08-27 | Stop reason: HOSPADM

## 2021-08-26 RX ADMIN — ACETAMINOPHEN 650 MG: 325 TABLET ORAL at 11:34

## 2021-08-26 RX ADMIN — SODIUM CHLORIDE, PRESERVATIVE FREE 10 ML: 5 INJECTION INTRAVENOUS at 21:02

## 2021-08-26 RX ADMIN — DICYCLOMINE HYDROCHLORIDE 20 MG: 20 INJECTION, SOLUTION INTRAMUSCULAR at 21:13

## 2021-08-26 RX ADMIN — SODIUM CHLORIDE, PRESERVATIVE FREE 10 ML: 5 INJECTION INTRAVENOUS at 08:35

## 2021-08-26 RX ADMIN — HYDRALAZINE HYDROCHLORIDE 25 MG: 25 TABLET, FILM COATED ORAL at 15:21

## 2021-08-26 RX ADMIN — LEVOTHYROXINE SODIUM 25 MCG: 25 TABLET ORAL at 11:34

## 2021-08-26 RX ADMIN — ENOXAPARIN SODIUM 40 MG: 40 INJECTION SUBCUTANEOUS at 17:52

## 2021-08-26 RX ADMIN — SODIUM CHLORIDE 1000 ML: 9 INJECTION, SOLUTION INTRAVENOUS at 23:11

## 2021-08-27 VITALS
HEART RATE: 78 BPM | WEIGHT: 158.8 LBS | SYSTOLIC BLOOD PRESSURE: 124 MMHG | BODY MASS INDEX: 29.98 KG/M2 | OXYGEN SATURATION: 98 % | TEMPERATURE: 97.9 F | DIASTOLIC BLOOD PRESSURE: 70 MMHG | HEIGHT: 61 IN | RESPIRATION RATE: 18 BRPM

## 2021-08-27 DIAGNOSIS — R00.1 SYMPTOMATIC BRADYCARDIA: Primary | ICD-10-CM

## 2021-08-27 LAB
ADV 40+41 DNA STL QL NAA+NON-PROBE: NOT DETECTED
ASTRO TYP 1-8 RNA STL QL NAA+NON-PROBE: NOT DETECTED
C CAYETANENSIS DNA STL QL NAA+NON-PROBE: NOT DETECTED
C COLI+JEJ+UPSA DNA STL QL NAA+NON-PROBE: NOT DETECTED
CRYPTOSP DNA STL QL NAA+NON-PROBE: NOT DETECTED
E HISTOLYT DNA STL QL NAA+NON-PROBE: NOT DETECTED
EAEC PAA PLAS AGGR+AATA ST NAA+NON-PRB: NOT DETECTED
EC STX1+STX2 GENES STL QL NAA+NON-PROBE: NOT DETECTED
EPEC EAE GENE STL QL NAA+NON-PROBE: NOT DETECTED
ETEC LTA+ST1A+ST1B TOX ST NAA+NON-PROBE: NOT DETECTED
G LAMBLIA DNA STL QL NAA+NON-PROBE: NOT DETECTED
NOROVIRUS GI+II RNA STL QL NAA+NON-PROBE: NOT DETECTED
P SHIGELLOIDES DNA STL QL NAA+NON-PROBE: NOT DETECTED
QT INTERVAL: 564 MS
QT INTERVAL: 576 MS
QTC INTERVAL: 439 MS
QTC INTERVAL: 470 MS
RVA RNA STL QL NAA+NON-PROBE: NOT DETECTED
S ENT+BONG DNA STL QL NAA+NON-PROBE: NOT DETECTED
SAPO I+II+IV+V RNA STL QL NAA+NON-PROBE: NOT DETECTED
SHIGELLA SP+EIEC IPAH ST NAA+NON-PROBE: NOT DETECTED
V CHOL+PARA+VUL DNA STL QL NAA+NON-PROBE: NOT DETECTED
V CHOLERAE DNA STL QL NAA+NON-PROBE: NOT DETECTED
Y ENTEROCOL DNA STL QL NAA+NON-PROBE: NOT DETECTED

## 2021-08-27 PROCEDURE — 99232 SBSQ HOSP IP/OBS MODERATE 35: CPT | Performed by: INTERNAL MEDICINE

## 2021-08-27 PROCEDURE — 63710000001 ONDANSETRON ODT 4 MG TABLET DISPERSIBLE: Performed by: STUDENT IN AN ORGANIZED HEALTH CARE EDUCATION/TRAINING PROGRAM

## 2021-08-27 PROCEDURE — 99239 HOSP IP/OBS DSCHRG MGMT >30: CPT | Performed by: STUDENT IN AN ORGANIZED HEALTH CARE EDUCATION/TRAINING PROGRAM

## 2021-08-27 PROCEDURE — 25010000002 ATROPINE PER 0.01 MG: Performed by: PHYSICIAN ASSISTANT

## 2021-08-27 RX ORDER — LEVOTHYROXINE SODIUM 0.07 MG/1
112 TABLET ORAL
Status: DISCONTINUED | OUTPATIENT
Start: 2021-08-28 | End: 2021-08-27 | Stop reason: HOSPADM

## 2021-08-27 RX ORDER — PROMETHAZINE HYDROCHLORIDE 12.5 MG/1
12.5 TABLET ORAL EVERY 6 HOURS PRN
Qty: 20 TABLET | Refills: 0 | Status: SHIPPED | OUTPATIENT
Start: 2021-08-27 | End: 2022-05-09

## 2021-08-27 RX ORDER — LEVOTHYROXINE SODIUM 112 UG/1
112 TABLET ORAL
Qty: 30 TABLET | Refills: 0 | Status: SHIPPED | OUTPATIENT
Start: 2021-08-28 | End: 2022-06-29

## 2021-08-27 RX ORDER — ATROPINE SULFATE 0.4 MG/ML
0.4 AMPUL (ML) INJECTION ONCE
Status: COMPLETED | OUTPATIENT
Start: 2021-08-27 | End: 2021-08-27

## 2021-08-27 RX ORDER — HYDRALAZINE HYDROCHLORIDE 25 MG/1
12.5 TABLET, FILM COATED ORAL EVERY 8 HOURS SCHEDULED
Qty: 45 TABLET | Refills: 0 | Status: SHIPPED | OUTPATIENT
Start: 2021-08-27 | End: 2022-05-09

## 2021-08-27 RX ORDER — ONDANSETRON 4 MG/1
4 TABLET, ORALLY DISINTEGRATING ORAL EVERY 6 HOURS PRN
Status: DISCONTINUED | OUTPATIENT
Start: 2021-08-27 | End: 2021-08-27 | Stop reason: HOSPADM

## 2021-08-27 RX ADMIN — LEVOTHYROXINE SODIUM 25 MCG: 25 TABLET ORAL at 06:26

## 2021-08-27 RX ADMIN — SODIUM CHLORIDE, PRESERVATIVE FREE 10 ML: 5 INJECTION INTRAVENOUS at 09:47

## 2021-08-27 RX ADMIN — ATROPINE SULFATE 0.4 MG: 0.4 INJECTION, SOLUTION INTRAMUSCULAR; INTRAVENOUS; SUBCUTANEOUS at 03:04

## 2021-08-27 RX ADMIN — ONDANSETRON 4 MG: 4 TABLET, ORALLY DISINTEGRATING ORAL at 11:23

## 2021-08-27 RX ADMIN — LOPERAMIDE HYDROCHLORIDE 4 MG: 2 CAPSULE ORAL at 06:26

## 2021-08-28 ENCOUNTER — READMISSION MANAGEMENT (OUTPATIENT)
Dept: CALL CENTER | Facility: HOSPITAL | Age: 51
End: 2021-08-28

## 2021-08-28 NOTE — OUTREACH NOTE
Prep Survey      Responses   [facility] patient discharged from?  [facility]   Is LACE score < 7 ?  No   Emergency Room discharge w/ pulse ox?  No   Eligibility  Readm Mgmt   Discharge diagnosis  Symptomatic bradycardia   Does the patient have one of the following disease processes/diagnoses(primary or secondary)?  Other   Does the patient have Home health ordered?  No   Is there a DME ordered?  No   Prep survey completed?  Yes          Tash Peraza RN

## 2021-08-30 LAB
BACTERIA SPEC AEROBE CULT: NORMAL
BACTERIA SPEC AEROBE CULT: NORMAL

## 2021-08-31 ENCOUNTER — READMISSION MANAGEMENT (OUTPATIENT)
Dept: CALL CENTER | Facility: HOSPITAL | Age: 51
End: 2021-08-31

## 2021-08-31 LAB
ALDOST SERPL-MCNC: 2.3 NG/DL (ref 0–30)
ALDOST/RENIN PLAS-RTO: >13.8 {RATIO} (ref 0–30)
RENIN PLAS-CCNC: <0.167 NG/ML/HR (ref 0.17–5.38)

## 2021-08-31 NOTE — OUTREACH NOTE
Medical Week 1 Survey      Responses   [facility] patient discharged from?  [facility]   Does the patient have one of the following disease processes/diagnoses(primary or secondary)?  Other   Week 1 attempt successful?  No   Unsuccessful attempts  Attempt 1          Jennifer Valderrama RN

## 2021-09-01 ENCOUNTER — READMISSION MANAGEMENT (OUTPATIENT)
Dept: CALL CENTER | Facility: HOSPITAL | Age: 51
End: 2021-09-01

## 2021-09-01 NOTE — OUTREACH NOTE
Medical Week 1 Survey      Responses   [facility] patient discharged from?  [facility]   Does the patient have one of the following disease processes/diagnoses(primary or secondary)?  Other   Week 1 attempt successful?  Yes   Call start time  1543   Call end time  1553   Meds reviewed with patient/caregiver?  Yes   Is the patient having any side effects they believe may be caused by any medication additions or changes?  No   Does the patient have all medications ordered at discharge?  Yes   Is the patient taking all medications as directed (includes completed medication regime)?  Yes   Comments regarding appointments  Rescheduled PCP appt 09/15/21.   Does the patient have a primary care provider?   Yes   Does the patient have an appointment with their PCP within 7 days of discharge?  Yes   Has the patient kept scheduled appointments due by today?  No   Has home health visited the patient within 72 hours of discharge?  N/A   Psychosocial issues?  No   Did the patient receive a copy of their discharge instructions?  Yes   Nursing interventions  Reviewed instructions with patient   What is the patient's perception of their health status since discharge?  Improving   Is the patient/caregiver able to teach back signs and symptoms related to disease process for when to call PCP?  Yes   Is the patient/caregiver able to teach back signs and symptoms related to disease process for when to call 911?  Yes   Is the patient/caregiver able to teach back the hierarchy of who to call/visit for symptoms/problems? PCP, Specialist, Home health nurse, Urgent Care, ED, 911  Yes   If the patient is a current smoker, are they able to teach back resources for cessation?  Not a smoker   Week 1 call completed?  Yes   Wrap up additional comments  States improving since discharge. States no further diarrhea. States received second covid vaccine yesterday. Reports HR 83 today. Denies any needs today.          Barby Milner RN

## 2021-09-02 ENCOUNTER — TELEPHONE (OUTPATIENT)
Dept: CARDIOLOGY | Facility: CLINIC | Age: 51
End: 2021-09-02

## 2021-09-02 NOTE — TELEPHONE ENCOUNTER
Called pt to advise them they can come by the office any day in between 8 am and 11 am to have there monitor put on or we can mail it. If they want it mailed please confirm their address. Can not be a PO box.       Pt stated to mail it to her. Confirmed her address.

## 2021-09-08 ENCOUNTER — READMISSION MANAGEMENT (OUTPATIENT)
Dept: CALL CENTER | Facility: HOSPITAL | Age: 51
End: 2021-09-08

## 2021-09-08 NOTE — OUTREACH NOTE
Medical Week 2 Survey      Responses   [facility] patient discharged from?  [facility]   Does the patient have one of the following disease processes/diagnoses(primary or secondary)?  Other   Week 2 attempt successful?  Yes   Call start time  1432   Discharge diagnosis  Symptomatic bradycardia   Call end time  1434   Meds reviewed with patient/caregiver?  Yes   Is the patient having any side effects they believe may be caused by any medication additions or changes?  No   Does the patient have all medications ordered at discharge?  Yes   Is the patient taking all medications as directed (includes completed medication regime)?  Yes   Comments regarding appointments  Rescheduled PCP appt 09/15/21.   Does the patient have a primary care provider?   Yes   Does the patient have an appointment with their PCP within 7 days of discharge?  Yes   Has the patient kept scheduled appointments due by today?  No   Has home health visited the patient within 72 hours of discharge?  N/A   Psychosocial issues?  No   Did the patient receive a copy of their discharge instructions?  Yes   Nursing interventions  Reviewed instructions with patient   What is the patient's perception of their health status since discharge?  Improving   Is the patient/caregiver able to teach back signs and symptoms related to disease process for when to call PCP?  Yes   Is the patient/caregiver able to teach back signs and symptoms related to disease process for when to call 911?  Yes   Is the patient/caregiver able to teach back the hierarchy of who to call/visit for symptoms/problems? PCP, Specialist, Home health nurse, Urgent Care, ED, 911  Yes   If the patient is a current smoker, are they able to teach back resources for cessation?  Not a smoker   Additional teach back comments  Hasn't recieved heart monitor yet.    Week 2 Call Completed?  Yes   Wrap up additional comments  States she is improving since discharge, waiting on delivery of heart monitor.            Shraddha Vázquez RN

## 2021-09-14 ENCOUNTER — READMISSION MANAGEMENT (OUTPATIENT)
Dept: CALL CENTER | Facility: HOSPITAL | Age: 51
End: 2021-09-14

## 2021-09-15 ENCOUNTER — READMISSION MANAGEMENT (OUTPATIENT)
Dept: CALL CENTER | Facility: HOSPITAL | Age: 51
End: 2021-09-15

## 2021-09-15 NOTE — OUTREACH NOTE
Medical Week 3 Survey      Responses   [facility] patient discharged from?  [facility]   Does the patient have one of the following disease processes/diagnoses(primary or secondary)?  Other   Week 3 attempt successful?  No   Call start time  0747   Unsuccessful attempts  Attempt 2   Rescheduled  Rescheduled-pt requested   Discharge diagnosis  Symptomatic bradycardia   Wrap up additional comments  Woke pt up, she ended call immediately          Shraddha Vázquez RN

## 2021-10-22 ENCOUNTER — TELEPHONE (OUTPATIENT)
Dept: CARDIOLOGY | Facility: CLINIC | Age: 51
End: 2021-10-22

## 2021-12-16 ENCOUNTER — TELEPHONE (OUTPATIENT)
Dept: CARDIOLOGY | Facility: CLINIC | Age: 51
End: 2021-12-16

## 2021-12-16 NOTE — TELEPHONE ENCOUNTER
Called pt to see if still had her monitor or if she ever received it.     Called pt the person that answered the phone stated she was not there. I asked to have Rani call us back. She expressed understanding.

## 2022-04-07 ENCOUNTER — HOSPITAL ENCOUNTER (OUTPATIENT)
Dept: MAMMOGRAPHY | Facility: HOSPITAL | Age: 52
Discharge: HOME | End: 2022-04-07
Admitting: PHYSICIAN ASSISTANT

## 2022-04-07 DIAGNOSIS — Z12.31 VISIT FOR SCREENING MAMMOGRAM: ICD-10-CM

## 2022-04-07 PROCEDURE — 77063 BREAST TOMOSYNTHESIS BI: CPT

## 2022-04-07 PROCEDURE — 77063 BREAST TOMOSYNTHESIS BI: CPT | Performed by: RADIOLOGY

## 2022-04-07 PROCEDURE — 77067 SCR MAMMO BI INCL CAD: CPT

## 2022-04-07 PROCEDURE — 77067 SCR MAMMO BI INCL CAD: CPT | Performed by: RADIOLOGY

## 2022-05-09 ENCOUNTER — APPOINTMENT (OUTPATIENT)
Dept: CT IMAGING | Facility: HOSPITAL | Age: 52
End: 2022-05-09

## 2022-05-09 ENCOUNTER — HOSPITAL ENCOUNTER (OUTPATIENT)
Facility: HOSPITAL | Age: 52
Setting detail: OBSERVATION
Discharge: HOME | End: 2022-05-10
Attending: STUDENT IN AN ORGANIZED HEALTH CARE EDUCATION/TRAINING PROGRAM | Admitting: STUDENT IN AN ORGANIZED HEALTH CARE EDUCATION/TRAINING PROGRAM

## 2022-05-09 ENCOUNTER — APPOINTMENT (OUTPATIENT)
Dept: GENERAL RADIOLOGY | Facility: HOSPITAL | Age: 52
End: 2022-05-09

## 2022-05-09 DIAGNOSIS — G93.41 METABOLIC ENCEPHALOPATHY: Primary | ICD-10-CM

## 2022-05-09 PROBLEM — R41.82 ALTERED MENTAL STATUS: Status: ACTIVE | Noted: 2022-05-09

## 2022-05-09 PROBLEM — R39.198 DIFFICULTY URINATING: Status: RESOLVED | Noted: 2017-03-24 | Resolved: 2022-05-09

## 2022-05-09 LAB
ALBUMIN SERPL-MCNC: 3.71 G/DL (ref 3.5–5.2)
ALBUMIN/GLOB SERPL: 1.5 G/DL
ALP SERPL-CCNC: 107 U/L (ref 39–117)
ALT SERPL W P-5'-P-CCNC: 71 U/L (ref 1–33)
AMMONIA BLD-SCNC: 102 UMOL/L (ref 11–51)
AMPHET+METHAMPHET UR QL: NEGATIVE
AMPHETAMINES UR QL: NEGATIVE
ANION GAP SERPL CALCULATED.3IONS-SCNC: 4.2 MMOL/L (ref 5–15)
AST SERPL-CCNC: 67 U/L (ref 1–32)
BARBITURATES UR QL SCN: NEGATIVE
BASOPHILS # BLD AUTO: 0.09 10*3/MM3 (ref 0–0.2)
BASOPHILS NFR BLD AUTO: 1.2 % (ref 0–1.5)
BENZODIAZ UR QL SCN: POSITIVE
BILIRUB SERPL-MCNC: 0.3 MG/DL (ref 0–1.2)
BILIRUB UR QL STRIP: NEGATIVE
BUN SERPL-MCNC: 16 MG/DL (ref 6–20)
BUN/CREAT SERPL: 18.4 (ref 7–25)
BUPRENORPHINE SERPL-MCNC: NEGATIVE NG/ML
CALCIUM SPEC-SCNC: 9 MG/DL (ref 8.6–10.5)
CANNABINOIDS SERPL QL: POSITIVE
CHLORIDE SERPL-SCNC: 103 MMOL/L (ref 98–107)
CLARITY UR: CLEAR
CO2 SERPL-SCNC: 28.8 MMOL/L (ref 22–29)
COCAINE UR QL: NEGATIVE
COLOR UR: ABNORMAL
CREAT SERPL-MCNC: 0.87 MG/DL (ref 0.57–1)
CRP SERPL-MCNC: 1.94 MG/DL (ref 0–0.5)
DEPRECATED RDW RBC AUTO: 45.1 FL (ref 37–54)
EGFRCR SERPLBLD CKD-EPI 2021: 81.8 ML/MIN/1.73
EOSINOPHIL # BLD AUTO: 0.82 10*3/MM3 (ref 0–0.4)
EOSINOPHIL NFR BLD AUTO: 10.5 % (ref 0.3–6.2)
ERYTHROCYTE [DISTWIDTH] IN BLOOD BY AUTOMATED COUNT: 13.2 % (ref 12.3–15.4)
ETHANOL BLD-MCNC: <10 MG/DL (ref 0–10)
ETHANOL UR QL: <0.01 %
FLUAV SUBTYP SPEC NAA+PROBE: NOT DETECTED
FLUBV RNA ISLT QL NAA+PROBE: NOT DETECTED
GLOBULIN UR ELPH-MCNC: 2.5 GM/DL
GLUCOSE BLDC GLUCOMTR-MCNC: 111 MG/DL (ref 70–130)
GLUCOSE SERPL-MCNC: 93 MG/DL (ref 65–99)
GLUCOSE UR STRIP-MCNC: NEGATIVE MG/DL
HCG SERPL QL: NEGATIVE
HCT VFR BLD AUTO: 35.3 % (ref 34–46.6)
HGB BLD-MCNC: 11.2 G/DL (ref 12–15.9)
HGB UR QL STRIP.AUTO: NEGATIVE
HOLD SPECIMEN: NORMAL
IMM GRANULOCYTES # BLD AUTO: 0.02 10*3/MM3 (ref 0–0.05)
IMM GRANULOCYTES NFR BLD AUTO: 0.3 % (ref 0–0.5)
KETONES UR QL STRIP: NEGATIVE
LEUKOCYTE ESTERASE UR QL STRIP.AUTO: NEGATIVE
LIPASE SERPL-CCNC: 17 U/L (ref 13–60)
LYMPHOCYTES # BLD AUTO: 3.66 10*3/MM3 (ref 0.7–3.1)
LYMPHOCYTES NFR BLD AUTO: 46.9 % (ref 19.6–45.3)
M PNEUMO IGM SER QL: NEGATIVE
MAGNESIUM SERPL-MCNC: 2.2 MG/DL (ref 1.6–2.6)
MCH RBC QN AUTO: 30 PG (ref 26.6–33)
MCHC RBC AUTO-ENTMCNC: 31.7 G/DL (ref 31.5–35.7)
MCV RBC AUTO: 94.6 FL (ref 79–97)
METHADONE UR QL SCN: NEGATIVE
MONOCYTES # BLD AUTO: 0.6 10*3/MM3 (ref 0.1–0.9)
MONOCYTES NFR BLD AUTO: 7.7 % (ref 5–12)
MRSA DNA SPEC QL NAA+PROBE: NEGATIVE
NEUTROPHILS NFR BLD AUTO: 2.61 10*3/MM3 (ref 1.7–7)
NEUTROPHILS NFR BLD AUTO: 33.4 % (ref 42.7–76)
NITRITE UR QL STRIP: NEGATIVE
NRBC BLD AUTO-RTO: 0 /100 WBC (ref 0–0.2)
OPIATES UR QL: POSITIVE
OXYCODONE UR QL SCN: POSITIVE
PCP UR QL SCN: NEGATIVE
PH UR STRIP.AUTO: 5.5 [PH] (ref 5–8)
PLATELET # BLD AUTO: 258 10*3/MM3 (ref 140–450)
PMV BLD AUTO: 10.9 FL (ref 6–12)
POTASSIUM SERPL-SCNC: 4.2 MMOL/L (ref 3.5–5.2)
PROCALCITONIN SERPL-MCNC: 0.06 NG/ML (ref 0–0.25)
PROPOXYPH UR QL: NEGATIVE
PROT SERPL-MCNC: 6.2 G/DL (ref 6–8.5)
PROT UR QL STRIP: ABNORMAL
QT INTERVAL: 454 MS
QT INTERVAL: 554 MS
QTC INTERVAL: 416 MS
QTC INTERVAL: 449 MS
RBC # BLD AUTO: 3.73 10*6/MM3 (ref 3.77–5.28)
S AUREUS DNA SPEC QL NAA+PROBE: NEGATIVE
SARS-COV-2 RNA PNL SPEC NAA+PROBE: NOT DETECTED
SODIUM SERPL-SCNC: 136 MMOL/L (ref 136–145)
SP GR UR STRIP: >1.03 (ref 1–1.03)
TRICYCLICS UR QL SCN: NEGATIVE
TROPONIN T SERPL-MCNC: <0.01 NG/ML (ref 0–0.03)
TSH SERPL DL<=0.05 MIU/L-ACNC: 3.01 UIU/ML (ref 0.27–4.2)
UROBILINOGEN UR QL STRIP: ABNORMAL
WBC NRBC COR # BLD: 7.8 10*3/MM3 (ref 3.4–10.8)
WHOLE BLOOD HOLD SPECIMEN: NORMAL
WHOLE BLOOD HOLD SPECIMEN: NORMAL

## 2022-05-09 PROCEDURE — 82077 ASSAY SPEC XCP UR&BREATH IA: CPT | Performed by: STUDENT IN AN ORGANIZED HEALTH CARE EDUCATION/TRAINING PROGRAM

## 2022-05-09 PROCEDURE — 80053 COMPREHEN METABOLIC PANEL: CPT | Performed by: STUDENT IN AN ORGANIZED HEALTH CARE EDUCATION/TRAINING PROGRAM

## 2022-05-09 PROCEDURE — 82140 ASSAY OF AMMONIA: CPT | Performed by: STUDENT IN AN ORGANIZED HEALTH CARE EDUCATION/TRAINING PROGRAM

## 2022-05-09 PROCEDURE — 74176 CT ABD & PELVIS W/O CONTRAST: CPT | Performed by: RADIOLOGY

## 2022-05-09 PROCEDURE — 84703 CHORIONIC GONADOTROPIN ASSAY: CPT | Performed by: STUDENT IN AN ORGANIZED HEALTH CARE EDUCATION/TRAINING PROGRAM

## 2022-05-09 PROCEDURE — G0378 HOSPITAL OBSERVATION PER HR: HCPCS

## 2022-05-09 PROCEDURE — P9612 CATHETERIZE FOR URINE SPEC: HCPCS

## 2022-05-09 PROCEDURE — 87641 MR-STAPH DNA AMP PROBE: CPT | Performed by: PHYSICIAN ASSISTANT

## 2022-05-09 PROCEDURE — 74176 CT ABD & PELVIS W/O CONTRAST: CPT

## 2022-05-09 PROCEDURE — 71045 X-RAY EXAM CHEST 1 VIEW: CPT | Performed by: RADIOLOGY

## 2022-05-09 PROCEDURE — 99284 EMERGENCY DEPT VISIT MOD MDM: CPT

## 2022-05-09 PROCEDURE — 83735 ASSAY OF MAGNESIUM: CPT | Performed by: PHYSICIAN ASSISTANT

## 2022-05-09 PROCEDURE — 80306 DRUG TEST PRSMV INSTRMNT: CPT | Performed by: STUDENT IN AN ORGANIZED HEALTH CARE EDUCATION/TRAINING PROGRAM

## 2022-05-09 PROCEDURE — 93005 ELECTROCARDIOGRAM TRACING: CPT | Performed by: STUDENT IN AN ORGANIZED HEALTH CARE EDUCATION/TRAINING PROGRAM

## 2022-05-09 PROCEDURE — 94664 DEMO&/EVAL PT USE INHALER: CPT

## 2022-05-09 PROCEDURE — 84484 ASSAY OF TROPONIN QUANT: CPT | Performed by: PHYSICIAN ASSISTANT

## 2022-05-09 PROCEDURE — 87640 STAPH A DNA AMP PROBE: CPT | Performed by: PHYSICIAN ASSISTANT

## 2022-05-09 PROCEDURE — 86738 MYCOPLASMA ANTIBODY: CPT | Performed by: PHYSICIAN ASSISTANT

## 2022-05-09 PROCEDURE — 71045 X-RAY EXAM CHEST 1 VIEW: CPT

## 2022-05-09 PROCEDURE — 87636 SARSCOV2 & INF A&B AMP PRB: CPT | Performed by: STUDENT IN AN ORGANIZED HEALTH CARE EDUCATION/TRAINING PROGRAM

## 2022-05-09 PROCEDURE — 84443 ASSAY THYROID STIM HORMONE: CPT | Performed by: PHYSICIAN ASSISTANT

## 2022-05-09 PROCEDURE — 83690 ASSAY OF LIPASE: CPT | Performed by: STUDENT IN AN ORGANIZED HEALTH CARE EDUCATION/TRAINING PROGRAM

## 2022-05-09 PROCEDURE — 25010000002 ENOXAPARIN PER 10 MG: Performed by: STUDENT IN AN ORGANIZED HEALTH CARE EDUCATION/TRAINING PROGRAM

## 2022-05-09 PROCEDURE — 81003 URINALYSIS AUTO W/O SCOPE: CPT | Performed by: STUDENT IN AN ORGANIZED HEALTH CARE EDUCATION/TRAINING PROGRAM

## 2022-05-09 PROCEDURE — 70450 CT HEAD/BRAIN W/O DYE: CPT | Performed by: RADIOLOGY

## 2022-05-09 PROCEDURE — 96374 THER/PROPH/DIAG INJ IV PUSH: CPT

## 2022-05-09 PROCEDURE — 96375 TX/PRO/DX INJ NEW DRUG ADDON: CPT

## 2022-05-09 PROCEDURE — 99220 PR INITIAL OBSERVATION CARE/DAY 70 MINUTES: CPT | Performed by: PHYSICIAN ASSISTANT

## 2022-05-09 PROCEDURE — 70450 CT HEAD/BRAIN W/O DYE: CPT

## 2022-05-09 PROCEDURE — 85025 COMPLETE CBC W/AUTO DIFF WBC: CPT | Performed by: STUDENT IN AN ORGANIZED HEALTH CARE EDUCATION/TRAINING PROGRAM

## 2022-05-09 PROCEDURE — 86140 C-REACTIVE PROTEIN: CPT | Performed by: PHYSICIAN ASSISTANT

## 2022-05-09 PROCEDURE — 96372 THER/PROPH/DIAG INJ SC/IM: CPT

## 2022-05-09 PROCEDURE — 84145 PROCALCITONIN (PCT): CPT | Performed by: PHYSICIAN ASSISTANT

## 2022-05-09 PROCEDURE — 82962 GLUCOSE BLOOD TEST: CPT

## 2022-05-09 PROCEDURE — C9803 HOPD COVID-19 SPEC COLLECT: HCPCS

## 2022-05-09 RX ORDER — TRAZODONE HYDROCHLORIDE 50 MG/1
150 TABLET ORAL NIGHTLY
Status: CANCELLED | OUTPATIENT
Start: 2022-05-09

## 2022-05-09 RX ORDER — HYDRALAZINE HYDROCHLORIDE 25 MG/1
12.5 TABLET, FILM COATED ORAL EVERY 8 HOURS PRN
COMMUNITY
End: 2023-01-04

## 2022-05-09 RX ORDER — GLYCOPYRROLATE 0.2 MG/ML
0.4 INJECTION INTRAMUSCULAR; INTRAVENOUS ONCE
Status: COMPLETED | OUTPATIENT
Start: 2022-05-09 | End: 2022-05-09

## 2022-05-09 RX ORDER — PANTOPRAZOLE SODIUM 40 MG/1
40 TABLET, DELAYED RELEASE ORAL
Status: DISCONTINUED | OUTPATIENT
Start: 2022-05-09 | End: 2022-05-10 | Stop reason: HOSPADM

## 2022-05-09 RX ORDER — CLONAZEPAM 0.5 MG/1
0.25 TABLET ORAL DAILY
COMMUNITY
End: 2022-10-18

## 2022-05-09 RX ORDER — PROMETHAZINE HYDROCHLORIDE 25 MG/1
25 TABLET ORAL EVERY 6 HOURS PRN
Status: CANCELLED | OUTPATIENT
Start: 2022-05-09

## 2022-05-09 RX ORDER — SODIUM CHLORIDE 0.9 % (FLUSH) 0.9 %
10 SYRINGE (ML) INJECTION AS NEEDED
Status: DISCONTINUED | OUTPATIENT
Start: 2022-05-09 | End: 2022-05-10 | Stop reason: HOSPADM

## 2022-05-09 RX ORDER — TIZANIDINE 4 MG/1
4 TABLET ORAL EVERY 8 HOURS PRN
Status: CANCELLED | OUTPATIENT
Start: 2022-05-09

## 2022-05-09 RX ORDER — HYDRALAZINE HYDROCHLORIDE 25 MG/1
12.5 TABLET, FILM COATED ORAL EVERY 8 HOURS PRN
Status: CANCELLED | OUTPATIENT
Start: 2022-05-09

## 2022-05-09 RX ORDER — NITROGLYCERIN 0.4 MG/1
0.4 TABLET SUBLINGUAL
Status: DISCONTINUED | OUTPATIENT
Start: 2022-05-09 | End: 2022-05-10 | Stop reason: HOSPADM

## 2022-05-09 RX ORDER — ENOXAPARIN SODIUM 100 MG/ML
40 INJECTION SUBCUTANEOUS NIGHTLY
Status: DISCONTINUED | OUTPATIENT
Start: 2022-05-09 | End: 2022-05-10 | Stop reason: HOSPADM

## 2022-05-09 RX ORDER — NALOXONE HYDROCHLORIDE 1 MG/ML
2 INJECTION INTRAMUSCULAR; INTRAVENOUS; SUBCUTANEOUS ONCE
Status: COMPLETED | OUTPATIENT
Start: 2022-05-09 | End: 2022-05-09

## 2022-05-09 RX ORDER — IPRATROPIUM BROMIDE AND ALBUTEROL SULFATE 2.5; .5 MG/3ML; MG/3ML
3 SOLUTION RESPIRATORY (INHALATION) EVERY 6 HOURS PRN
Status: DISCONTINUED | OUTPATIENT
Start: 2022-05-09 | End: 2022-05-10 | Stop reason: HOSPADM

## 2022-05-09 RX ORDER — SODIUM CHLORIDE 0.9 % (FLUSH) 0.9 %
10 SYRINGE (ML) INJECTION EVERY 12 HOURS SCHEDULED
Status: DISCONTINUED | OUTPATIENT
Start: 2022-05-09 | End: 2022-05-10 | Stop reason: HOSPADM

## 2022-05-09 RX ORDER — POLYETHYLENE GLYCOL 3350 17 G/17G
17 POWDER, FOR SOLUTION ORAL DAILY
Status: DISCONTINUED | OUTPATIENT
Start: 2022-05-09 | End: 2022-05-10 | Stop reason: HOSPADM

## 2022-05-09 RX ORDER — TRAZODONE HYDROCHLORIDE 150 MG/1
150 TABLET ORAL NIGHTLY
COMMUNITY
End: 2022-06-29

## 2022-05-09 RX ORDER — NALOXONE HYDROCHLORIDE 1 MG/ML
INJECTION INTRAMUSCULAR; INTRAVENOUS; SUBCUTANEOUS
Status: COMPLETED
Start: 2022-05-09 | End: 2022-05-09

## 2022-05-09 RX ORDER — AMOXICILLIN 250 MG
2 CAPSULE ORAL NIGHTLY
Status: DISCONTINUED | OUTPATIENT
Start: 2022-05-09 | End: 2022-05-10 | Stop reason: HOSPADM

## 2022-05-09 RX ORDER — PROMETHAZINE HYDROCHLORIDE 25 MG/1
25 TABLET ORAL EVERY 6 HOURS PRN
COMMUNITY

## 2022-05-09 RX ORDER — LACTULOSE 10 G/15ML
20 SOLUTION ORAL ONCE
Status: COMPLETED | OUTPATIENT
Start: 2022-05-09 | End: 2022-05-09

## 2022-05-09 RX ORDER — CLONAZEPAM 0.5 MG/1
0.25 TABLET ORAL 2 TIMES DAILY PRN
Status: CANCELLED | OUTPATIENT
Start: 2022-05-09 | End: 2022-05-16

## 2022-05-09 RX ORDER — TIZANIDINE 4 MG/1
4 TABLET ORAL 3 TIMES DAILY PRN
COMMUNITY

## 2022-05-09 RX ORDER — LEVOTHYROXINE SODIUM 0.07 MG/1
112 TABLET ORAL
Status: CANCELLED | OUTPATIENT
Start: 2022-05-10

## 2022-05-09 RX ADMIN — NALOXONE HYDROCHLORIDE 2 MG: 1 INJECTION INTRAMUSCULAR; INTRAVENOUS; SUBCUTANEOUS at 12:34

## 2022-05-09 RX ADMIN — NALOXONE HYDROCHLORIDE 2 MG: 1 INJECTION, SOLUTION INTRAMUSCULAR; INTRAVENOUS; SUBCUTANEOUS at 12:34

## 2022-05-09 RX ADMIN — PANTOPRAZOLE SODIUM 40 MG: 40 TABLET, DELAYED RELEASE ORAL at 20:34

## 2022-05-09 RX ADMIN — LACTULOSE 20 G: 10 SOLUTION ORAL at 14:14

## 2022-05-09 RX ADMIN — GLYCOPYRROLATE 0.4 MG: 0.2 INJECTION INTRAMUSCULAR; INTRAVENOUS at 12:32

## 2022-05-09 RX ADMIN — POLYETHYLENE GLYCOL (3350) 17 G: 17 POWDER, FOR SOLUTION ORAL at 20:35

## 2022-05-09 RX ADMIN — DOCUSATE SODIUM 50 MG AND SENNOSIDES 8.6 MG 2 TABLET: 8.6; 5 TABLET, FILM COATED ORAL at 20:35

## 2022-05-09 RX ADMIN — SODIUM CHLORIDE 1000 ML: 9 INJECTION, SOLUTION INTRAVENOUS at 13:20

## 2022-05-09 RX ADMIN — ENOXAPARIN SODIUM 40 MG: 40 INJECTION SUBCUTANEOUS at 20:35

## 2022-05-09 NOTE — ED PROVIDER NOTES
Subjective   51-year-old female presents to the ER with her mother due to concerns for altered mental status and bradycardia.  Patient's mother noted she was sitting next and the patient went patient became slightly confused and the home pulse oximeter noted a heart rate of 49.  Patient takes metoprolol and hydralazine for blood pressure.  Patient's mother did not have any obvious concerns for substance abuse or association.  On arrival to the ER, patient is increasingly somnolent but will answer questions clearly.  Patient does have some intermittent confusion.  No focal neurological deficits noted.  Pupils are slightly constricted.  No obvious dysarthria or dysphagia.  Vitals stable          Review of Systems   Constitutional: Positive for fatigue.   Neurological: Positive for weakness.   Psychiatric/Behavioral: Positive for confusion.   All other systems reviewed and are negative.      Past Medical History:   Diagnosis Date   • Anxiety    • Bipolar disorder (HCC)    • Depression    • Fibromyalgia        Allergies   Allergen Reactions   • Lyrica [Pregabalin]    • Cymbalta [Duloxetine Hcl]    • Erythromycin    • Lithium    • Toradol [Ketorolac Tromethamine]    • Tramadol    • Penicillins Rash     Pt received Cefepime and ceftriaxone before         Past Surgical History:   Procedure Laterality Date   • BREAST BIOPSY Left 1997    benign   • CARDIAC CATHETERIZATION N/A 7/3/2020    Procedure: Left Heart Cath;  Surgeon: Ludmila Murray MD;  Location: [facility] CATH INVASIVE LOCATION;  Service: Cardiovascular;  Laterality: N/A;   • HYSTERECTOMY      30       Family History   Problem Relation Age of Onset   • Heart disease Mother    • Anxiety disorder Mother    • Depression Mother    • Throat cancer Father        Social History     Socioeconomic History   • Marital status:    Tobacco Use   • Smoking status: Never Smoker   • Smokeless tobacco: Never Used   Substance and Sexual Activity   • Alcohol use: No   • Drug use:  No   • Sexual activity: Defer           Objective   Physical Exam  Constitutional:       General: She is not in acute distress.     Appearance: Normal appearance. She is not ill-appearing.   HENT:      Head: Normocephalic and atraumatic.      Right Ear: External ear normal.      Left Ear: External ear normal.      Nose: Nose normal.      Mouth/Throat:      Mouth: Mucous membranes are moist.   Eyes:      Extraocular Movements: Extraocular movements intact.      Pupils: Pupils are equal, round, and reactive to light.   Cardiovascular:      Rate and Rhythm: Regular rhythm. Bradycardia present.      Heart sounds: No murmur heard.  Pulmonary:      Effort: Pulmonary effort is normal. No respiratory distress.      Breath sounds: Normal breath sounds. No wheezing.   Abdominal:      General: Bowel sounds are normal.      Palpations: Abdomen is soft.      Tenderness: There is no abdominal tenderness.   Musculoskeletal:         General: No deformity or signs of injury. Normal range of motion.      Cervical back: Normal range of motion and neck supple.   Skin:     General: Skin is warm and dry.      Findings: No erythema.   Neurological:      General: No focal deficit present.      Mental Status: She is alert.      Cranial Nerves: No cranial nerve deficit.      Comments: Patient is awake and alert.  Patient is oriented to some questioning but some intermittent confusion is noted.  No significant dysarthria or dysphagia.  No facial asymmetry.  No neurological deficits.   Psychiatric:         Mood and Affect: Mood normal.         Behavior: Behavior normal.         Thought Content: Thought content normal.         Procedures           ED Course  ED Course as of 05/09/22 1512   Mon May 09, 2022   1238 EKG noted sinus bradycardia.  34 bpm.  .  .  QTc 416.  No acute ST elevation [SF]   1511 CBC unremarkable.  CCP unremarkable.  Elevated ammonia.  No history of cirrhosis.  Ethanol within normal limits.  UDS positive for  THC, opiates, benzos, and oxycodone.  COVID-19 testing negative.  Urinalysis unremarkable.  Head CT without contrast no no acute abnormality.  CT of the abdomen pelvis noted no acute intra-abdominal findings but noted concerns for lower lobe pneumonia.  Concerns for new onset metabolic encephalopathy.  Recommend admission for further work up and treatment.  Hospitalist team consulted and made aware of the patient.  Consults and orders placed per hospitalist request.  Patient was agreeable to admission plan.  Vitals stable on admission. [SF]      ED Course User Index  [SF] Ken Perez DO                                                 [facility]    Final diagnoses:   Metabolic encephalopathy       ED Disposition  ED Disposition     ED Disposition   Decision to Admit    Condition   --    Comment   Level of Care: Telemetry [5]   Diagnosis: Altered mental status [780.97.ICD-9-CM]   Admitting Physician: MICHI HERNANDEZ [[id number]]   Attending Physician: MICHI HERNANDEZ [[id number]]               No follow-up provider specified.       Medication List      No changes were made to your prescriptions during this visit.          Ken Perez DO  05/09/22 1512

## 2022-05-09 NOTE — H&P
[facility] HOSPITALIST HISTORY AND PHYSICAL    Patient Identification:  Name:  Rani Martins  Age:  51 y.o.  Sex:  female  :  1970  MRN:  [id number]   Visit Number:  [id number]  Admit Date: 2022   Primary Care Physician:  Eldon Christian PA     2022 12:12 PM    Subjective     Chief complaint:   Chief Complaint   Patient presents with   • Fatigue     History of presenting illness:   Rani Martins is a 51 y.o. female with past medical history significant for anxiety, depression, bipolar disorder, and fibromyalgia.  She presented to the emergency department of [facility] on 2022 due to fatigue and altered mental status and bradycardia.  The patient's mother is at bedside and provides most the history.  She reports that earlier this morning her daughter was walking in the kitchen.  She was drowsy and she thought she might be sleepwalking which she is done in the past.  She had difficulty arousing her.  She checked her heart rate and it was in the 30s.  She brought her to the ED for further evaluation.  She does report recently having a cough which has been dry for about the last 5 days.  No fever or chills.  No dysuria or hematuria.  She complains of some generalized abdominal pain.  No nausea, vomiting, or diarrhea.  During my evaluation, the patient reports that she wants to go home.  I explained the risks of going home without further evaluation/treatment for her symptoms.  She expresses understanding and is willing to stay for now after encouragement from her mother.    Please note the patient was previously admitted to our facility in 2021 with symptomatic bradycardia into the 30s.  At that time, she was found to have hypokalemia, hypomagnesemia, hypocalcemia.  Electrolytes were replaced and bradycardia improved.  She had a transthoracic echocardiogram at that time which showed EF of 66 to 70% and mild pulmonary pretension with RVSP 35 to 45 mmHg.  An event  monitor was recommended, but does not appear to have been completed at that time.  Previous left heart catheterization in July 2020 showed normal coronary arteries.  There was evidence of diastolic dysfunction.    Upon arrival to the ED, vitals were /61, respirations 17, heart rate 34, SPO2 98%, afebrile.  ALT 71, AST 67.  Ammonia level elevated at 102.  Lipase within normal limits as well as WBC count.  Qualitative hCG is negative.  UA shows a trace of protein and increased specific gravity.  Otherwise negative.  Ethanol level is negative.  UDS is positive for benzodiazepine, opiates, oxycodone, THC.  Admission testing for COVID-19 and influenza A/B are negative.  CXR shows mild cardiomegaly.  There is a nodule of the right upper lobe which could be calcified but should be further evaluated with a CT of the chest.  CT head without contrast was a limited exam secondary to motion artifact.  No hemorrhage, no edema, no ventriculomegaly.  CT abdomen/pelvis without contrast shows left lower lobe nonspecific airspace disease.    In the ED, she received treatment with a 1 L normal saline fluid bolus, 2 mg IV Narcan, 20 g p.o. lactulose, and 0.4 mg of Robinul. Patient has been admitted to the telemetry unit of [facility] for further evaluation and therapy.   ---------------------------------------------------------------------------------------------------------------------   Review of Systems   Constitutional: Positive for fatigue. Negative for chills and fever.   HENT: Negative for congestion, rhinorrhea, sinus pressure and sinus pain.    Respiratory: Positive for cough. Negative for shortness of breath and wheezing.    Cardiovascular: Negative for chest pain and leg swelling.   Gastrointestinal: Positive for abdominal pain. Negative for anal bleeding, blood in stool, constipation, diarrhea, nausea and vomiting.   Genitourinary: Negative for decreased urine volume, difficulty urinating, dysuria,  hematuria and urgency.   Musculoskeletal: Negative for arthralgias, back pain and gait problem.   Skin: Negative for color change, pallor, rash and wound.   Neurological: Positive for headaches. Negative for dizziness, syncope, speech difficulty and weakness.   Psychiatric/Behavioral: Positive for confusion. Negative for agitation and behavioral problems.      ---------------------------------------------------------------------------------------------------------------------   Past Medical History:   Diagnosis Date   • Anxiety    • Bipolar disorder (HCC)    • Depression    • Fibromyalgia      Past Surgical History:   Procedure Laterality Date   • BREAST BIOPSY Left 1997    benign   • CARDIAC CATHETERIZATION N/A 7/3/2020    Procedure: Left Heart Cath;  Surgeon: Ludmila Murray MD;  Location: [facility] CATH INVASIVE LOCATION;  Service: Cardiovascular;  Laterality: N/A;   • HYSTERECTOMY      30     Family History   Problem Relation Age of Onset   • Heart disease Mother    • Anxiety disorder Mother    • Depression Mother    • Throat cancer Father      Social History     Socioeconomic History   • Marital status:    Tobacco Use   • Smoking status: Never Smoker   • Smokeless tobacco: Never Used   Substance and Sexual Activity   • Alcohol use: No   • Drug use: No   • Sexual activity: Defer     ---------------------------------------------------------------------------------------------------------------------   Allergies:  Lyrica [pregabalin], Cymbalta [duloxetine hcl], Erythromycin, Lithium, Toradol [ketorolac tromethamine], Tramadol, and Penicillins  ---------------------------------------------------------------------------------------------------------------------   Prior to Admission Medications     Prescriptions Last Dose Informant Patient Reported? Taking?    levothyroxine (SYNTHROID, LEVOTHROID) 112 MCG tablet   No No    Take 1 tablet by mouth Every Morning.    promethazine (PHENERGAN) 12.5 MG tablet   No No     Take 1 tablet by mouth Every 6 (Six) Hours As Needed for Nausea or Vomiting.        ---------------------------------------------------------------------------------------------------------------------  Objective   [facility] Meds:        ---------------------------------------------------------------------------------------------------------------------   Vital Signs:  Temp:  [97.6 °F (36.4 °C)] 97.6 °F (36.4 °C)  Heart Rate:  [34] 34  Resp:  [17] 17  BP: (134)/(61) 134/61  No data found.  SpO2 Percentage    05/09/22 1223   SpO2: 98%     SpO2:  [98 %] 98 %  on   ;        Body mass index is 27.08 kg/m².  Wt Readings from Last 3 Encounters:   05/09/22 65 kg (143 lb 4.8 oz)   08/27/21 72 kg (158 lb 12.8 oz)   06/13/21 65.8 kg (145 lb)     ---------------------------------------------------------------------------------------------------------------------   Physical Exam:  Constitutional:  Well-developed and well-nourished.  No respiratory distress on room air.      HENT:  Head: Normocephalic and atraumatic.  Mouth:  Moist mucous membranes.    Eyes:  Conjunctivae and EOM are normal. No scleral icterus. No erythema or drainage.   Neck:  Neck supple.  No carotid bruits.  Cardiovascular:  Normal rate, regular rhythm and normal heart sounds with no murmur.  Pulmonary/Chest:  No respiratory distress, no wheezes, no crackles, with normal breath sounds and good air movement.  Abdominal:  Soft.  Bowel sounds are present x4.  No distension and no tenderness.   Musculoskeletal:  No edema, no tenderness, and no deformity.  No red or swollen joints anywhere.    Neurological:  Alert and oriented to person, place, and time. No tongue deviation.  No facial droop.  No slurred speech.   Skin:  Skin is warm and dry.  No rash noted.  No pallor.   Psychiatric:  Somewhat agitated.  Peripheral vascular:  No edema and 2+ pedal pulses bilaterally.   Genitourinary: No farnsworth catheter in  place.  ---------------------------------------------------------------------------------------------------------------------  EKG:  Pending cardiology interpretation. Per my read, reveals Sinus bradycardia at 34 bpm. Possible left atrial enlargement. Subtle ST depression and T wave inversion noted in V1 and V2.  Somewhat peaked T waves noted.  QTc 416 ms. Findings are similar to previous EKGs.     I have personally reviewed the EKG.  ---------------------------------------------------------------------------------------------------------------------             Results from last 7 days   Lab Units 05/09/22  1237   WBC 10*3/mm3 7.80   HEMOGLOBIN g/dL 11.2*   HEMATOCRIT % 35.3   MCV fL 94.6   MCHC g/dL 31.7   PLATELETS 10*3/mm3 258     Results from last 7 days   Lab Units 05/09/22  1237   SODIUM mmol/L 136   POTASSIUM mmol/L 4.2   CHLORIDE mmol/L 103   CO2 mmol/L 28.8   BUN mg/dL 16   CREATININE mg/dL 0.87   CALCIUM mg/dL 9.0   GLUCOSE mg/dL 93   ALBUMIN g/dL 3.71   BILIRUBIN mg/dL 0.3   ALK PHOS U/L 107   AST (SGOT) U/L 67*   ALT (SGPT) U/L 71*   Estimated Creatinine Clearance: 67.5 mL/min (by C-G formula based on SCr of 0.87 mg/dL).  Ammonia   Date Value Ref Range Status   05/09/2022 102 (H) 11 - 51 umol/L Final     Glucose   Date/Time Value Ref Range Status   05/09/2022 1243 111 70 - 130 mg/dL Final     Comment:     Meter: [id number] : [id number] brittany elizalde     Lab Results   Component Value Date    HGBA1C 5.00 01/29/2018     Lab Results   Component Value Date    TSH 7.170 (H) 08/25/2021    FREET4 0.95 01/19/2016     No results found for: BLOODCX  No results found for: URINECX  No results found for: WOUNDCX  No results found for: STOOLCX  No results found for: RESPCX    Pain Management Panel     Pain Management Panel Latest Ref Rng & Units 5/9/2022 8/25/2021    AMPHETAMINES SCREEN, URINE Negative Negative Negative    BARBITURATES SCREEN Negative Negative Negative    BENZODIAZEPINE SCREEN, URINE Negative  Positive(A) Negative    BUPRENORPHINEUR Negative Negative Negative    COCAINE SCREEN, URINE Negative Negative Negative    METHADONE SCREEN, URINE Negative Negative Negative    METHAMPHETAMINEUR Negative Negative -        I have personally reviewed the above laboratory results.   ---------------------------------------------------------------------------------------------------------------------  Imaging Results (Last 7 Days)     Procedure Component Value Units Date/Time    CT Abdomen Pelvis Without Contrast [[id number]] Collected: 05/09/22 1358     Updated: 05/09/22 1421    Narrative:      EXAM:    CT Abdomen and Pelvis Without Intravenous Contrast     EXAM DATE:    5/9/2022 1:30 PM     CLINICAL HISTORY:    Abdominal pain, acute, nonlocalized     TECHNIQUE:    Axial computed tomography images of the abdomen and pelvis without  intravenous contrast.  Sagittal and coronal reformatted images were  created and reviewed.  This CT exam was performed using one or more of  the following dose reduction techniques:  automated exposure control,  adjustment of the mA and/or kV according to patient size, and/or use of  iterative reconstruction technique.     COMPARISON:    No relevant prior studies available.     FINDINGS:    LUNG BASES:  Left lower lobe nonspecific airspace disease.      ABDOMEN:    LIVER:  Unremarkable.    GALLBLADDER AND BILE DUCTS:  Unremarkable.  No calcified stones.  No  ductal dilation.    PANCREAS:  Unremarkable.  No ductal dilation.    SPLEEN:  Unremarkable.  No splenomegaly.    ADRENALS:  Unremarkable.  No mass.    KIDNEYS AND URETERS:  Unremarkable.  No obstructing stones.  No  hydronephrosis.    STOMACH AND BOWEL:  Scattered stool throughout the colon.  No  obstruction.  No mucosal thickening.      PELVIS:    APPENDIX:  No findings to suggest acute appendicitis.    BLADDER:  Unremarkable.  No stones.    REPRODUCTIVE:  Unremarkable as visualized.      ABDOMEN and PELVIS:    INTRAPERITONEAL SPACE:   Unremarkable.  No free air.  No significant  fluid collection.    BONES/JOINTS:  No acute fracture.  No dislocation.    SOFT TISSUES:  Unremarkable.    VASCULATURE:  Unremarkable.  No abdominal aortic aneurysm.    LYMPH NODES:  Unremarkable.  No enlarged lymph nodes.       Impression:        Left lower lobe nonspecific airspace disease.     This report was finalized on 5/9/2022 1:59 PM by Dr. Allan Melchor MD.       CT Head Without Contrast [[id number]] Collected: 05/09/22 1359     Updated: 05/09/22 1421    Narrative:      EXAM:    CT Head Without Intravenous Contrast     EXAM DATE:    5/9/2022 1:30 PM     CLINICAL HISTORY:    Mental status change, unknown cause     TECHNIQUE:    Axial computed tomography images of the head/brain without intravenous  contrast.  Sagittal and coronal reformatted images were created and  reviewed.  This CT exam was performed using one or more of the following  dose reduction techniques:  automated exposure control, adjustment of  the mA and/or kV according to patient size, and/or use of iterative  reconstruction technique.     COMPARISON:    No relevant prior studies available.     FINDINGS:    LIMITATIONS:  Limited by motion artifact near the vertex.    BRAIN:  Unremarkable.  No hemorrhage.  No significant white matter  disease.  No edema.    VENTRICLES:  Unremarkable.  No ventriculomegaly.    BONES/JOINTS:  Unremarkable.  No acute fracture.    SOFT TISSUES:  Unremarkable.    SINUSES:  Unremarkable as visualized.  No acute sinusitis.    MASTOID AIR CELLS:  Unremarkable as visualized.  No mastoid effusion.       Impression:        Limited by motion artifact near the vertex.     This report was finalized on 5/9/2022 2:00 PM by Dr. Allan Melchor MD.       XR Chest 1 View [[id number]] Collected: 05/09/22 1342     Updated: 05/09/22 1345    Narrative:      EXAM:    XR Chest, 1 View     EXAM DATE:    5/9/2022 12:45 PM     CLINICAL HISTORY:    AMS protocol     TECHNIQUE:    Frontal view of the  chest.     COMPARISON:    No relevant prior studies available.     FINDINGS:    LUNGS:  Nodule of the right upper lobe that could be calcified but  should be further evaluated with CT scan of the chest.  Coarsened  interstitial markings noted throughout the lungs.    PLEURAL SPACE:  Unremarkable.  No pneumothorax.    HEART:  Mild cardiomegaly.    MEDIASTINUM:  Unremarkable.    BONES/JOINTS:  Unremarkable.       Impression:      1.  Mild cardiomegaly.  2.  Nodule of the right upper lobe that could be calcified but should be  further evaluated with CT scan of the chest.     This report was finalized on 5/9/2022 1:43 PM by Dr. Allan Melchor MD.           Left Heart Catheterization 07/03/2020        ---------------------------------------------------------------------------------------------------------------------  Assessment & Plan      Acute Hospital Problems:    -Altered mental status, improved  -Toxic vs metabolic vs hepatic encephalopathy   -UDS + benzodiazepine, opiate, oxycodone, THC  · CT head without contrast was a poor quality study secondary to motion artifact.  · Patient received 2 mg IV Narcan in the ED.  Initially no improvement noted, however, patient is awake and alert at this time.  · Elevated ammonia level may have also been contributing as patient was given lactulose in the ED.  · Monitor neuro checks every 4 hours.  · Fall precautions.    -Possible left lower lobe community-acquired pneumonia  -RUL lung nodule   · CT chest recommended by radiology for RUL lung nodule and has been ordered.   · If confirmed lung nodule noted, will refer to lung nodule clinic.  · Currently maintaining O2 saturations on room air.  · WBC count within normal limits.   · Obtain CRP and procalcitonin level. If inflammatory markers elevated, will consider adding IV antibiotic therapy for pneumonia.   · Check sputum cultures, mycoplasma, strep pneumo, MRSA PCR screen.   · Add incentive spirometry.   · CBC in AM.     -Sinus  bradycardia in the 30s  -Abnormal EKG with chronic findings of possible anterior ischemia   · Ischemic changes on EKG appear to be chronic.  · Previous history of sinus bradycardia in 8/2021 resolved with correcting electrolytes.  · Potassium within normal limits.  · Check magnesium and TSH.  · Follow-up on serial troponins.  · TTE report from 8/26/2021 reviewed listed above.  EF 66 to 70% with normal diastolic function.  · Monitor on telemetry.  · Denies any recent tick bites or history of tick borne illness.     -Elevated ammonia level  -Mildly elevated transaminases   · Received a one-time dose of lactulose 20 g in the ED.  Mental status has since improved.  · CT abdomen/pelvis without contrast shows unremarkable appearance of the liver gallbladder and bile ducts.  · Obtain acute hepatitis panel.  · Repeat CMP in AM.    -Mild normocytic anemia   · Mild, monitor.   · CBC in AM.      -Essential hypertension  · Resume home antihypertensive regimen as appropriate with holding parameters once confirmed by pharmacy.     -DVT Prophylaxis  • Lovenox.     -Admission COVID-19 testing  • Negative.     Chronic Medical Problems:  · Anxiety/depression  · Schizophrenia  · Bipolar disorder  · Fibromyalgia    The patient is considered to be a high risk patient due to: altered mental status, + UDS.     Code Status: DNR/DNI (Discussed by Dr. Chauhan with the patient at bedside).     I have discussed the patient's assessment and plan with the patient and admitting physician, Dr. Chauhan.    Disposition: Plans to return home on discharge.     Charmaine Ospina PA-C  05/09/22  16:47 EDT  ---------------------------------------------------------------------------------------------------------------------

## 2022-05-10 ENCOUNTER — APPOINTMENT (OUTPATIENT)
Dept: CT IMAGING | Facility: HOSPITAL | Age: 52
End: 2022-05-10

## 2022-05-10 VITALS
RESPIRATION RATE: 18 BRPM | SYSTOLIC BLOOD PRESSURE: 184 MMHG | DIASTOLIC BLOOD PRESSURE: 83 MMHG | HEART RATE: 69 BPM | TEMPERATURE: 98 F | WEIGHT: 150.6 LBS | OXYGEN SATURATION: 98 % | BODY MASS INDEX: 28.43 KG/M2 | HEIGHT: 61 IN

## 2022-05-10 PROBLEM — R77.8 ELEVATED TROPONIN: Status: RESOLVED | Noted: 2020-06-27 | Resolved: 2022-05-10

## 2022-05-10 PROBLEM — R82.5 POSITIVE URINE DRUG SCREEN: Status: ACTIVE | Noted: 2022-05-10

## 2022-05-10 PROBLEM — I10 ESSENTIAL (PRIMARY) HYPERTENSION: Chronic | Status: ACTIVE | Noted: 2022-05-10

## 2022-05-10 PROBLEM — F41.9 ANXIETY DISORDER: Chronic | Status: ACTIVE | Noted: 2022-05-10

## 2022-05-10 PROBLEM — E87.6 HYPOKALEMIA: Status: ACTIVE | Noted: 2022-05-10

## 2022-05-10 PROBLEM — R79.89 ELEVATED TROPONIN: Status: RESOLVED | Noted: 2020-06-27 | Resolved: 2022-05-10

## 2022-05-10 LAB
AMMONIA BLD-SCNC: 24 UMOL/L (ref 11–51)
ANION GAP SERPL CALCULATED.3IONS-SCNC: 11.4 MMOL/L (ref 5–15)
BASOPHILS # BLD AUTO: 0.07 10*3/MM3 (ref 0–0.2)
BASOPHILS NFR BLD AUTO: 0.5 % (ref 0–1.5)
BUN SERPL-MCNC: 9 MG/DL (ref 6–20)
BUN/CREAT SERPL: 13.6 (ref 7–25)
CALCIUM SPEC-SCNC: 8.6 MG/DL (ref 8.6–10.5)
CHLORIDE SERPL-SCNC: 106 MMOL/L (ref 98–107)
CO2 SERPL-SCNC: 23.6 MMOL/L (ref 22–29)
CREAT SERPL-MCNC: 0.66 MG/DL (ref 0.57–1)
CRP SERPL-MCNC: 3.78 MG/DL (ref 0–0.5)
DEPRECATED RDW RBC AUTO: 42.7 FL (ref 37–54)
EGFRCR SERPLBLD CKD-EPI 2021: 107.7 ML/MIN/1.73
EOSINOPHIL # BLD AUTO: 0.75 10*3/MM3 (ref 0–0.4)
EOSINOPHIL NFR BLD AUTO: 5.5 % (ref 0.3–6.2)
ERYTHROCYTE [DISTWIDTH] IN BLOOD BY AUTOMATED COUNT: 12.8 % (ref 12.3–15.4)
GLUCOSE SERPL-MCNC: 88 MG/DL (ref 65–99)
HAV IGM SERPL QL IA: NORMAL
HBV CORE IGM SERPL QL IA: NORMAL
HBV SURFACE AG SERPL QL IA: NORMAL
HCT VFR BLD AUTO: 37.6 % (ref 34–46.6)
HCV AB SER DONR QL: NORMAL
HGB BLD-MCNC: 12.2 G/DL (ref 12–15.9)
IMM GRANULOCYTES # BLD AUTO: 0.07 10*3/MM3 (ref 0–0.05)
IMM GRANULOCYTES NFR BLD AUTO: 0.5 % (ref 0–0.5)
LYMPHOCYTES # BLD AUTO: 3.39 10*3/MM3 (ref 0.7–3.1)
LYMPHOCYTES NFR BLD AUTO: 24.7 % (ref 19.6–45.3)
MCH RBC QN AUTO: 29.7 PG (ref 26.6–33)
MCHC RBC AUTO-ENTMCNC: 32.4 G/DL (ref 31.5–35.7)
MCV RBC AUTO: 91.5 FL (ref 79–97)
MONOCYTES # BLD AUTO: 0.79 10*3/MM3 (ref 0.1–0.9)
MONOCYTES NFR BLD AUTO: 5.7 % (ref 5–12)
NEUTROPHILS NFR BLD AUTO: 63.1 % (ref 42.7–76)
NEUTROPHILS NFR BLD AUTO: 8.68 10*3/MM3 (ref 1.7–7)
NRBC BLD AUTO-RTO: 0 /100 WBC (ref 0–0.2)
PLATELET # BLD AUTO: 262 10*3/MM3 (ref 140–450)
PMV BLD AUTO: 10.8 FL (ref 6–12)
POTASSIUM SERPL-SCNC: 3.3 MMOL/L (ref 3.5–5.2)
RBC # BLD AUTO: 4.11 10*6/MM3 (ref 3.77–5.28)
S PNEUM AG SPEC QL LA: NEGATIVE
SODIUM SERPL-SCNC: 141 MMOL/L (ref 136–145)
TROPONIN T SERPL-MCNC: <0.01 NG/ML (ref 0–0.03)
TROPONIN T SERPL-MCNC: <0.01 NG/ML (ref 0–0.03)
WBC NRBC COR # BLD: 13.75 10*3/MM3 (ref 3.4–10.8)

## 2022-05-10 PROCEDURE — 82140 ASSAY OF AMMONIA: CPT

## 2022-05-10 PROCEDURE — G0378 HOSPITAL OBSERVATION PER HR: HCPCS

## 2022-05-10 PROCEDURE — 84484 ASSAY OF TROPONIN QUANT: CPT | Performed by: PHYSICIAN ASSISTANT

## 2022-05-10 PROCEDURE — 99213 OFFICE O/P EST LOW 20 MIN: CPT | Performed by: PSYCHIATRY & NEUROLOGY

## 2022-05-10 PROCEDURE — 71250 CT THORAX DX C-: CPT

## 2022-05-10 PROCEDURE — 99217 PR OBSERVATION CARE DISCHARGE MANAGEMENT: CPT

## 2022-05-10 PROCEDURE — 85025 COMPLETE CBC W/AUTO DIFF WBC: CPT | Performed by: PHYSICIAN ASSISTANT

## 2022-05-10 PROCEDURE — 80048 BASIC METABOLIC PNL TOTAL CA: CPT | Performed by: PHYSICIAN ASSISTANT

## 2022-05-10 PROCEDURE — 86140 C-REACTIVE PROTEIN: CPT

## 2022-05-10 PROCEDURE — 87899 AGENT NOS ASSAY W/OPTIC: CPT | Performed by: PHYSICIAN ASSISTANT

## 2022-05-10 PROCEDURE — 71250 CT THORAX DX C-: CPT | Performed by: RADIOLOGY

## 2022-05-10 PROCEDURE — 80074 ACUTE HEPATITIS PANEL: CPT | Performed by: PHYSICIAN ASSISTANT

## 2022-05-10 PROCEDURE — 94799 UNLISTED PULMONARY SVC/PX: CPT

## 2022-05-10 PROCEDURE — 63710000001 ONDANSETRON PER 8 MG: Performed by: STUDENT IN AN ORGANIZED HEALTH CARE EDUCATION/TRAINING PROGRAM

## 2022-05-10 RX ORDER — ONDANSETRON 4 MG/1
4 TABLET, FILM COATED ORAL EVERY 6 HOURS PRN
Status: DISCONTINUED | OUTPATIENT
Start: 2022-05-10 | End: 2022-05-10 | Stop reason: HOSPADM

## 2022-05-10 RX ORDER — POTASSIUM CHLORIDE 7.45 MG/ML
10 INJECTION INTRAVENOUS
Status: DISCONTINUED | OUTPATIENT
Start: 2022-05-10 | End: 2022-05-10 | Stop reason: HOSPADM

## 2022-05-10 RX ORDER — ONDANSETRON 2 MG/ML
4 INJECTION INTRAMUSCULAR; INTRAVENOUS EVERY 6 HOURS PRN
Status: DISCONTINUED | OUTPATIENT
Start: 2022-05-10 | End: 2022-05-10 | Stop reason: HOSPADM

## 2022-05-10 RX ORDER — POTASSIUM CHLORIDE 1.5 G/1.77G
40 POWDER, FOR SOLUTION ORAL AS NEEDED
Status: DISCONTINUED | OUTPATIENT
Start: 2022-05-10 | End: 2022-05-10 | Stop reason: HOSPADM

## 2022-05-10 RX ORDER — POTASSIUM CHLORIDE 20 MEQ/1
40 TABLET, EXTENDED RELEASE ORAL EVERY 4 HOURS
Status: DISCONTINUED | OUTPATIENT
Start: 2022-05-10 | End: 2022-05-10 | Stop reason: HOSPADM

## 2022-05-10 RX ORDER — LEVOTHYROXINE SODIUM 0.07 MG/1
112 TABLET ORAL
Status: DISCONTINUED | OUTPATIENT
Start: 2022-05-10 | End: 2022-05-10 | Stop reason: HOSPADM

## 2022-05-10 RX ORDER — POTASSIUM CHLORIDE 20 MEQ/1
40 TABLET, EXTENDED RELEASE ORAL AS NEEDED
Status: DISCONTINUED | OUTPATIENT
Start: 2022-05-10 | End: 2022-05-10 | Stop reason: HOSPADM

## 2022-05-10 RX ORDER — TRAZODONE HYDROCHLORIDE 50 MG/1
150 TABLET ORAL NIGHTLY
Status: DISCONTINUED | OUTPATIENT
Start: 2022-05-10 | End: 2022-05-10 | Stop reason: HOSPADM

## 2022-05-10 RX ADMIN — POTASSIUM CHLORIDE 40 MEQ: 20 TABLET, EXTENDED RELEASE ORAL at 11:59

## 2022-05-10 RX ADMIN — ONDANSETRON HYDROCHLORIDE 4 MG: 4 TABLET, FILM COATED ORAL at 11:59

## 2022-05-10 RX ADMIN — PANTOPRAZOLE SODIUM 40 MG: 40 TABLET, DELAYED RELEASE ORAL at 05:08

## 2022-05-10 RX ADMIN — POLYETHYLENE GLYCOL (3350) 17 G: 17 POWDER, FOR SOLUTION ORAL at 08:51

## 2022-05-10 RX ADMIN — Medication 10 ML: at 08:51

## 2022-05-10 RX ADMIN — LEVOTHYROXINE SODIUM 112 MCG: 0.07 TABLET ORAL at 11:59

## 2022-05-10 NOTE — PROGRESS NOTES
"Patient Identification:  Name:  Rani Martins  Age:  51 y.o.  Sex:  female  :  1970  MRN:  [id number]  Visit Number:  [id number]  Primary Care Provider:  Eldon Christian PA    Length of stay:  0    Subjective/Interval History/Consultants/Procedures     Chief complaint: Fatigue    Subjective: Today, patient reports severe anxiety due to concern for lung nodule. The patient states that she fears that she has cancer and that she is going to die. Patient intermittently crying during exam. The patient reports that she vomited once this morning, but could not describe emesis because she \"didn't look at it\". She reports intermittent nausea as well; her mother is at bedside and states that patient has been experiencing ongoing nausea and vomiting x several weeks, and she is scheduled for an outpatient EGD in  for further evaluation. She reports a decreased appetite, and states that she did not want to eat her breakfast this morning. She denies fevers, chills, diarrhea, constipation, dizziness, and dysuria. She reports that she had a normal BM this morning and has been urinating without difficulty. She states that she wants to go home. When asked about reported hallucinations, the patient states that she thought she heard voices through the vent in the ceiling, but it \"might have just been the television\". She denies any SI or HI currently. Per chart review, patient follows with psychiatry outpatient for history of bipolar disorder, schizophrenia, severe anxiety, and panic attacks.    Discussed with AM MALACHI Mccann with no acute events overnight, however, night RN did report to Siobhan that patient was experiencing auditory hallucinations. Room location at the time of evaluation was [address] [place] [address].  ----------------------------------------------------------------------------------------------------------------------  Current Hospital Meds:  enoxaparin, 40 mg, Subcutaneous, Nightly  levothyroxine, 112 mcg, Oral, Q " AM  pantoprazole, 40 mg, Oral, Q AM  polyethylene glycol, 17 g, Oral, Daily  potassium chloride, 40 mEq, Oral, Q4H  senna-docusate sodium, 2 tablet, Oral, Nightly  sodium chloride, 10 mL, Intravenous, Q12H  traZODone, 150 mg, Oral, Nightly         ----------------------------------------------------------------------------------------------------------------------      Objective     Vital Signs:  Temp:  [97.6 °F (36.4 °C)-98.6 °F (37 °C)] 98.4 °F (36.9 °C)  Heart Rate:  [34-76] 76  Resp:  [17-18] 18  BP: (101-137)/(50-79) 115/70      05/09/22  1223 05/09/22  1858   Weight: 65 kg (143 lb 4.8 oz) 68.3 kg (150 lb 9.6 oz)     Body mass index is 28.46 kg/m².    Intake/Output Summary (Last 24 hours) at 5/10/2022 1136  Last data filed at 5/10/2022 0500  Gross per 24 hour   Intake 1000 ml   Output 1350 ml   Net -350 ml     No intake/output data recorded.  Diet Regular  ----------------------------------------------------------------------------------------------------------------------    Physical Exam  Vitals and nursing note reviewed.   Constitutional:       General: She is awake. She is not in acute distress.     Appearance: She is not diaphoretic.   HENT:      Head: Normocephalic.      Mouth/Throat:      Pharynx: Oropharynx is clear.   Eyes:      Pupils: Pupils are equal, round, and reactive to light.   Cardiovascular:      Rate and Rhythm: Normal rate and regular rhythm.      Pulses: Normal pulses.           Radial pulses are 2+ on the right side and 2+ on the left side.        Dorsalis pedis pulses are 2+ on the right side and 2+ on the left side.      Heart sounds: Normal heart sounds. No murmur heard.    No friction rub. No gallop.   Pulmonary:      Effort: Pulmonary effort is normal. No respiratory distress.      Breath sounds: Normal breath sounds. No wheezing or rhonchi.   Abdominal:      General: Bowel sounds are normal.      Palpations: Abdomen is soft. There is no mass.      Tenderness: There is abdominal  tenderness in the right upper quadrant. There is no rebound.      Hernia: No hernia is present.      Comments: Bowel sounds normoactive/audible x 4 quadrants   Musculoskeletal:         General: No swelling.      Cervical back: Normal range of motion and neck supple.      Right lower leg: No edema.      Left lower leg: No edema.   Skin:     General: Skin is warm and dry.      Capillary Refill: Capillary refill takes less than 2 seconds.      Coloration: Skin is pale.   Neurological:      Mental Status: She is alert and oriented to person, place, and time.      Cranial Nerves: No facial asymmetry.      Motor: No weakness.   Psychiatric:         Attention and Perception: Attention normal.         Mood and Affect: Mood is anxious. Affect is labile.         Speech: Speech normal.         Thought Content: Thought content does not include suicidal ideation.         Cognition and Memory: Memory is impaired (patient states she cannot remember the events of yesterday).       ----------------------------------------------------------------------------------------------------------------------  Tele:  Appears normal sinus rhythm 70's.  ----------------------------------------------------------------------------------------------------------------------  Results from last 7 days   Lab Units 05/10/22  0706 05/10/22  0133 05/09/22  1948   TROPONIN T ng/mL <0.010 <0.010 <0.010     Results from last 7 days   Lab Units 05/10/22  0821 05/10/22  0706 05/09/22  1237   CRP mg/dL  --  3.78* 1.94*   WBC 10*3/mm3 13.75*  --  7.80   HEMOGLOBIN g/dL 12.2  --  11.2*   HEMATOCRIT % 37.6  --  35.3   MCV fL 91.5  --  94.6   MCHC g/dL 32.4  --  31.7   PLATELETS 10*3/mm3 262  --  258         Results from last 7 days   Lab Units 05/10/22  0706 05/09/22  1237   SODIUM mmol/L 141 136   POTASSIUM mmol/L 3.3* 4.2   MAGNESIUM mg/dL  --  2.2   CHLORIDE mmol/L 106 103   CO2 mmol/L 23.6 28.8   BUN mg/dL 9 16   CREATININE mg/dL 0.66 0.87   CALCIUM mg/dL 8.6  9.0   GLUCOSE mg/dL 88 93   ALBUMIN g/dL  --  3.71   BILIRUBIN mg/dL  --  0.3   ALK PHOS U/L  --  107   AST (SGOT) U/L  --  67*   ALT (SGPT) U/L  --  71*   Estimated Creatinine Clearance: 91.2 mL/min (by C-G formula based on SCr of 0.66 mg/dL).  Ammonia   Date Value Ref Range Status   05/09/2022 102 (H) 11 - 51 umol/L Final       ----------------------------------------------------------------------------------------------------------------------  Imaging Results (Last 24 Hours)     Procedure Component Value Units Date/Time    CT Chest Without Contrast Diagnostic [[id number]] Collected: 05/10/22 0857     Updated: 05/10/22 0900    Narrative:      EXAM:    CT Chest Without Intravenous Contrast     EXAM DATE:    5/10/2022 8:38 AM     CLINICAL HISTORY:    Lung nodule, CT recommended. Possible pneumonia as well.;  G93.41-Metabolic encephalopathy     TECHNIQUE:    Axial computed tomography images of the chest without intravenous  contrast.  Sagittal and coronal reformatted images were created and  reviewed.  This CT exam was performed using one or more of the following  dose reduction techniques:  automated exposure control, adjustment of  the mA and/or kV according to patient size, and/or use of iterative  reconstruction technique.     COMPARISON:    06/30/2020     FINDINGS:    LUNGS:  Bilateral subpleural faint groundglass opacities that may  represent residual scarring from previous pneumonia.    PLEURAL SPACE:  Unremarkable.  No pneumothorax.  No significant  effusion.    HEART:  Unremarkable.  No cardiomegaly.  No significant pericardial  effusion.  No significant coronary artery calcifications.    BONES/JOINTS:  Unremarkable.  No acute fracture.  No dislocation.    SOFT TISSUES:  Unremarkable.    VASCULATURE:  Unremarkable.  No thoracic aortic aneurysm.    LYMPH NODES:  Unremarkable.  No enlarged lymph nodes.       Impression:        Bilateral subpleural faint groundglass opacities that may represent  residual  scarring from previous pneumonia.     This report was finalized on 5/10/2022 8:57 AM by Dr. Allan Melchor MD.       CT Abdomen Pelvis Without Contrast [[id number]] Collected: 05/09/22 1358     Updated: 05/09/22 1421    Narrative:      EXAM:    CT Abdomen and Pelvis Without Intravenous Contrast     EXAM DATE:    5/9/2022 1:30 PM     CLINICAL HISTORY:    Abdominal pain, acute, nonlocalized     TECHNIQUE:    Axial computed tomography images of the abdomen and pelvis without  intravenous contrast.  Sagittal and coronal reformatted images were  created and reviewed.  This CT exam was performed using one or more of  the following dose reduction techniques:  automated exposure control,  adjustment of the mA and/or kV according to patient size, and/or use of  iterative reconstruction technique.     COMPARISON:    No relevant prior studies available.     FINDINGS:    LUNG BASES:  Left lower lobe nonspecific airspace disease.      ABDOMEN:    LIVER:  Unremarkable.    GALLBLADDER AND BILE DUCTS:  Unremarkable.  No calcified stones.  No  ductal dilation.    PANCREAS:  Unremarkable.  No ductal dilation.    SPLEEN:  Unremarkable.  No splenomegaly.    ADRENALS:  Unremarkable.  No mass.    KIDNEYS AND URETERS:  Unremarkable.  No obstructing stones.  No  hydronephrosis.    STOMACH AND BOWEL:  Scattered stool throughout the colon.  No  obstruction.  No mucosal thickening.      PELVIS:    APPENDIX:  No findings to suggest acute appendicitis.    BLADDER:  Unremarkable.  No stones.    REPRODUCTIVE:  Unremarkable as visualized.      ABDOMEN and PELVIS:    INTRAPERITONEAL SPACE:  Unremarkable.  No free air.  No significant  fluid collection.    BONES/JOINTS:  No acute fracture.  No dislocation.    SOFT TISSUES:  Unremarkable.    VASCULATURE:  Unremarkable.  No abdominal aortic aneurysm.    LYMPH NODES:  Unremarkable.  No enlarged lymph nodes.       Impression:        Left lower lobe nonspecific airspace disease.     This report was  finalized on 5/9/2022 1:59 PM by Dr. Allan Melchor MD.       CT Head Without Contrast [[id number]] Collected: 05/09/22 1359     Updated: 05/09/22 1421    Narrative:      EXAM:    CT Head Without Intravenous Contrast     EXAM DATE:    5/9/2022 1:30 PM     CLINICAL HISTORY:    Mental status change, unknown cause     TECHNIQUE:    Axial computed tomography images of the head/brain without intravenous  contrast.  Sagittal and coronal reformatted images were created and  reviewed.  This CT exam was performed using one or more of the following  dose reduction techniques:  automated exposure control, adjustment of  the mA and/or kV according to patient size, and/or use of iterative  reconstruction technique.     COMPARISON:    No relevant prior studies available.     FINDINGS:    LIMITATIONS:  Limited by motion artifact near the vertex.    BRAIN:  Unremarkable.  No hemorrhage.  No significant white matter  disease.  No edema.    VENTRICLES:  Unremarkable.  No ventriculomegaly.    BONES/JOINTS:  Unremarkable.  No acute fracture.    SOFT TISSUES:  Unremarkable.    SINUSES:  Unremarkable as visualized.  No acute sinusitis.    MASTOID AIR CELLS:  Unremarkable as visualized.  No mastoid effusion.       Impression:        Limited by motion artifact near the vertex.     This report was finalized on 5/9/2022 2:00 PM by Dr. Allan Melchor MD.       XR Chest 1 View [[id number]] Collected: 05/09/22 1342     Updated: 05/09/22 1345    Narrative:      EXAM:    XR Chest, 1 View     EXAM DATE:    5/9/2022 12:45 PM     CLINICAL HISTORY:    AMS protocol     TECHNIQUE:    Frontal view of the chest.     COMPARISON:    No relevant prior studies available.     FINDINGS:    LUNGS:  Nodule of the right upper lobe that could be calcified but  should be further evaluated with CT scan of the chest.  Coarsened  interstitial markings noted throughout the lungs.    PLEURAL SPACE:  Unremarkable.  No pneumothorax.    HEART:  Mild cardiomegaly.     MEDIASTINUM:  Unremarkable.    BONES/JOINTS:  Unremarkable.       Impression:      1.  Mild cardiomegaly.  2.  Nodule of the right upper lobe that could be calcified but should be  further evaluated with CT scan of the chest.     This report was finalized on 5/9/2022 1:43 PM by Dr. Allan Melchor MD.           ----------------------------------------------------------------------------------------------------------------------   I have reviewed the above laboratory values for 05/10/22    Assessment/Plan     Active Hospital Problems    Diagnosis  POA   • **Altered mental status [R41.82]  Yes   • Hypokalemia [E87.6]  No   • Positive urine drug screen [R82.5]  Yes   • Essential (primary) hypertension [I10]  Yes   • Anxiety disorder [F41.9]  Yes       ASSESSMENT/PLAN:  -Altered mental status, improved  -Toxic vs metabolic vs hepatic encephalopathy   -UDS + benzodiazepines, opiates, oxycodone, THC  -CT head without contrast was a poor quality study secondary to motion artifact.  -Patient received 2 mg IV Narcan in the ED.  Initially no improvement noted, however, patient is awake, alert, and oriented to person, place and time currently.  -Elevated ammonia level may have also been contributing as patient was given lactulose in the ED.  -Monitor neuro checks every 4 hours.  -Fall precautions.     -Possible left lower lobe community-acquired pneumonia  - ? RUL lung nodule on chest x-ray  -Currently maintaining O2 saturations on room air  -WBC initially normal, has increased to 13.75 this morning  -C-RP and procalcitonin levels normal; holding antibiotic therapy presently  -MRSA PCR and mycoplasma negative  -Strep Pneumo AG pending  -Sputum culture ordered, however patient reports nonproductive cough currently  -Encourage incentive spirometry use  -CT chest recommended by radiology for further evaluation of possible RUL lung nodule noted on chest xray   -CT chest completed this morning revealing bilateral subpleural faint  ground-glass opacities that may represent residual scarring from previous pneumonia; no nodules per radiology interpretation (see imaging below)         -Sinus bradycardia in the 30s, improved  -Abnormal EKG with chronic findings of possible anterior ischemia   -Ischemic changes on EKG appear to be chronic  -Previous history of sinus bradycardia in 8/2021 resolved with correcting electrolytes  -TTE report from 8/26/2021 reviewed listed above; EF 66 to 70% with normal diastolic function  -Continuous cardiac monitoring   -Denies any recent tick bites or history of tick borne illness   -Potassium, magnesium, and TSH all within normal limits  -Troponin negative x 3  -Normal sinus rhythm 70's today     -Elevated ammonia level  -Mildly elevated transaminases   -Reports of generalized abdominal pain  -Nausea/vomiting  -Received a one-time dose of lactulose 20 g in the ED. Mental status has since improved.  -CT abdomen/pelvis without contrast shows unremarkable appearance of the liver gallbladder and bile ducts  -Acute hepatitis panel non-reactive  -Will repeat ammonia level; may require another 20g dose of lactulose  -No rebound tenderness and Metz's sign negative upon physical exam  -Zofran PO or IV Q 6 hours PRN for nausea/vomiting  -Repeat CMP in AM     -Mild normocytic anemia   -Mild decrease in Hgb noted on admission; 11.2  -Hgb normalized this AM at 12.2  -Repeat CBC in AM      -Essential hypertension  -BP currently stable; systolic pressures ranging from 110-130's overnight  -Consider resuming home antihypertensive regimen as appropriate with holding parameters; will proceed with caution given bradycardia on admission  -Continue to monitor VS per hospital policy     -DVT Prophylaxis  -Lovenox     -Admission COVID-19 testing  -Negative    -Hypokalemia  -Potassium 3.3 this AM  -Replace electrolytes per protocol  -Repeat CMP in AM    -History of anxiety/depression  -History of schizophrenia  -History of bipolar  disorder  -Reported auditory hallucinations overnight  -Denies SI or HI  -Experiencing worsening symptoms of anxiety today; crying during exam/labile mood observed  -Continue trazodone 150 mg nightly; holding home klonopin due to bradycardia in the 30's upon admission   -Inpatient psychiatry consult placed; evaluation and input greatly appreciated    -----------  -DVT prophylaxis: Lovenox  -GI prophylaxis: PPI  -Activity: As tolerated/fall precautions in place.  -Disposition plans/anticipated needs: plans to return home on discharge.  -Diet: Regular      The patient is high risk due to the following diagnoses/reasons: altered mental status, positive urine drug screen, elevated ammonia level, and bradycardia.          GABE Patten  05/10/22  11:36 EDT  Pager #[phone]

## 2022-05-10 NOTE — CONSULTS
Referring Provider: Dr. Chauhan  Reason for Consultation: Hallucinations      Chief complaint/Focus of Exam: AMS/hallucinations    Subjective .     History of present illness: Patient is a 51-year-old female with a history of anxiety, depression, possible bipolar disorder, and fibromyalgia who presented to the ER with altered mental state and bradycardia.  Psychiatry was consulted to evaluate for hallucinations which she reported while hospitalized.  I evaluated the patient at bedside with her mother present with her permission.  Patient stated that she had hallucinations when she first got to the hospital but has not had any before this and has not had any since.  She states that she sees an outside provider for mental health and is prescribed clonazepam nightly but is unsure of her diagnosis and other treatments.  She is alert and oriented currently.  She denies SI/HI/AVH.  She is not responding to internal stimuli.  I asked patient if it would be appropriate to discuss her urine drug screen results in front of her mother and she was agreeable.  I advised that her urine drug screen was positive for benzodiazepines, opiates, and THC.  Patient denied illicit substance use and reported that she had a prescription for Percocets.  This is not verified on [facility].  Patient then was somewhat uncertain and could not answer who was prescribing the medication or when she last received a prescription but reports that she took the medication yesterday.  I do not feel that patient is being completely truthful about her substance use.  The urine drug screen did test that is performed in the ER usually does not test positive for benzodiazepines for a Klonopin prescription and I suspect there may be some other benzodiazepine use.  She does not appear to be withdrawing.    Review of Systems  Pertinent items are noted in HPI    History  Past Medical History:   Diagnosis Date   • Anxiety    • Bipolar disorder (HCC)    • Depression     • Fibromyalgia    ,   Past Surgical History:   Procedure Laterality Date   • BREAST BIOPSY Left 1997    benign   • CARDIAC CATHETERIZATION N/A 7/3/2020    Procedure: Left Heart Cath;  Surgeon: Ludmila Murray MD;  Location: [facility] CATH INVASIVE LOCATION;  Service: Cardiovascular;  Laterality: N/A;   • HYSTERECTOMY      30   ,   Family History   Problem Relation Age of Onset   • Heart disease Mother    • Anxiety disorder Mother    • Depression Mother    • Throat cancer Father    ,   Social History     Socioeconomic History   • Marital status:    Tobacco Use   • Smoking status: Never Smoker   • Smokeless tobacco: Never Used   Substance and Sexual Activity   • Alcohol use: No   • Drug use: No   • Sexual activity: Defer     E-cigarette/Vaping     E-cigarette/Vaping Substances     E-cigarette/Vaping Devices       ,   Medications Prior to Admission   Medication Sig Dispense Refill Last Dose   • levothyroxine (SYNTHROID, LEVOTHROID) 112 MCG tablet Take 1 tablet by mouth Every Morning. 30 tablet 0 5/7/2022 at Unknown time   • promethazine (PHENERGAN) 25 MG tablet Take 25 mg by mouth Every 6 (Six) Hours As Needed for Nausea or Vomiting.   Past Month at Unknown time   • clonazePAM (KlonoPIN) 0.5 MG tablet Take 0.25 mg by mouth 2 (Two) Times a Day As Needed.   5/7/2022   • hydrALAZINE (APRESOLINE) 25 MG tablet Take 12.5 mg by mouth Every 8 (Eight) Hours As Needed. Prior to [facility] Admission, Patient was on:  Patient has this if she needs it but has not had to take it in around a year. Last picked up from pharmacy 05/04/22   More than a month at Unknown time   • tiZANidine (ZANAFLEX) 4 MG tablet Take 4 mg by mouth Every 8 (Eight) Hours As Needed for Muscle Spasms.   5/7/2022   • traZODone (DESYREL) 150 MG tablet Take 150 mg by mouth Every Night.   Unknown at Unknown time   , Scheduled Meds:  enoxaparin, 40 mg, Subcutaneous, Nightly  levothyroxine, 112 mcg, Oral, Q AM  pantoprazole, 40 mg, Oral, Q AM  polyethylene  glycol, 17 g, Oral, Daily  potassium chloride, 40 mEq, Oral, Q4H  senna-docusate sodium, 2 tablet, Oral, Nightly  sodium chloride, 10 mL, Intravenous, Q12H  traZODone, 150 mg, Oral, Nightly    , Continuous Infusions:   , PRN Meds:  ipratropium-albuterol  •  nitroglycerin  •  ondansetron **OR** ondansetron  •  potassium chloride **OR** potassium chloride **OR** potassium chloride  •  sodium chloride  •  [COMPLETED] Insert peripheral IV **AND** sodium chloride  •  [COMPLETED] Insert peripheral IV **AND** sodium chloride  •  sodium chloride and Allergies:  Lyrica [pregabalin], Cymbalta [duloxetine hcl], Erythromycin, Lithium, Toradol [ketorolac tromethamine], Tramadol, and Penicillins    Objective     Vital Signs   Temp:  [98 °F (36.7 °C)-98.6 °F (37 °C)] 98 °F (36.7 °C)  Heart Rate:  [51-76] 69  Resp:  [18] 18  BP: (101-184)/(50-83) 184/83    Mental Status Exam:   Mental Status Exam:    Hygiene:   good  Cooperation:  Guarded  Eye Contact:  Good  Psychomotor Behavior:  Appropriate  Affect:  Full range  Hopelessness: Denies  Speech:  Normal  Thought Progress:  Goal directed and Linear  Thought Content:  Normal and Mood congruent  Suicidal:  None  Homicidal:  None  Hallucinations:  Visual and Not demonstrated today  Delusion:  None  Memory:  Deficits  Orientation:  Person, Place, Time and Situation  Reliability:  poor  Insight:  Poor  Judgement:  Poor  Impulse Control:  Fair    Results Review:   I reviewed the patient's new clinical results.  Lab Results (last 24 hours)     Procedure Component Value Units Date/Time    Ammonia [[id number]]  (Normal) Collected: 05/10/22 1237    Specimen: Blood Updated: 05/10/22 1313     Ammonia 24 umol/L     S. Pneumo Ag Urine or CSF - Urine, Urine, Clean Catch [[id number]]  (Normal) Collected: 05/10/22 0113    Specimen: Urine, Clean Catch Updated: 05/10/22 1306     Strep Pneumo Ag Negative    CBC & Differential [[id number]]  (Abnormal) Collected: 05/10/22 0821    Specimen: Blood Updated:  05/10/22 0903    Narrative:      The following orders were created for panel order CBC & Differential.  Procedure                               Abnormality         Status                     ---------                               -----------         ------                     CBC Auto Differential[[id number]]        Abnormal            Final result                 Please view results for these tests on the individual orders.    CBC Auto Differential [[id number]]  (Abnormal) Collected: 05/10/22 0821    Specimen: Blood Updated: 05/10/22 0903     WBC 13.75 10*3/mm3      RBC 4.11 10*6/mm3      Hemoglobin 12.2 g/dL      Hematocrit 37.6 %      MCV 91.5 fL      MCH 29.7 pg      MCHC 32.4 g/dL      RDW 12.8 %      RDW-SD 42.7 fl      MPV 10.8 fL      Platelets 262 10*3/mm3      Neutrophil % 63.1 %      Lymphocyte % 24.7 %      Monocyte % 5.7 %      Eosinophil % 5.5 %      Basophil % 0.5 %      Immature Grans % 0.5 %      Neutrophils, Absolute 8.68 10*3/mm3      Lymphocytes, Absolute 3.39 10*3/mm3      Monocytes, Absolute 0.79 10*3/mm3      Eosinophils, Absolute 0.75 10*3/mm3      Basophils, Absolute 0.07 10*3/mm3      Immature Grans, Absolute 0.07 10*3/mm3      nRBC 0.0 /100 WBC     C-reactive Protein [[id number]]  (Abnormal) Collected: 05/10/22 0706    Specimen: Blood Updated: 05/10/22 0821     C-Reactive Protein 3.78 mg/dL     Basic Metabolic Panel [[id number]]  (Abnormal) Collected: 05/10/22 0706    Specimen: Blood Updated: 05/10/22 0806     Glucose 88 mg/dL      BUN 9 mg/dL      Creatinine 0.66 mg/dL      Sodium 141 mmol/L      Potassium 3.3 mmol/L      Chloride 106 mmol/L      CO2 23.6 mmol/L      Calcium 8.6 mg/dL      BUN/Creatinine Ratio 13.6     Anion Gap 11.4 mmol/L      eGFR 107.7 mL/min/1.73      Comment: National Kidney Foundation and American Society of Nephrology (ASN) Task Force recommended calculation based on the Chronic Kidney Disease Epidemiology Collaboration (CKD-EPI) equation refit without adjustment for  race.       Narrative:      GFR Normal >60  Chronic Kidney Disease <60  Kidney Failure <15      Troponin [[id number]]  (Normal) Collected: 05/10/22 0706    Specimen: Blood Updated: 05/10/22 0749     Troponin T <0.010 ng/mL     Narrative:      Troponin T Reference Range:  <= 0.03 ng/mL-   Negative for AMI  >0.03 ng/mL-     Abnormal for myocardial necrosis.  Clinicians would have to utilize clinical acumen, EKG, Troponin and serial changes to determine if it is an Acute Myocardial Infarction or myocardial injury due to an underlying chronic condition.       Results may be falsely decreased if patient taking Biotin.      Hepatitis Panel, Acute [[id number]]  (Normal) Collected: 05/10/22 0138    Specimen: Blood Updated: 05/10/22 0330     Hepatitis B Surface Ag Non-Reactive     Hep A IgM Non-Reactive     Hep B C IgM Non-Reactive     Hepatitis C Ab Non-Reactive    Narrative:      Results may be falsely decreased if patient taking Biotin.     Troponin [[id number]]  (Normal) Collected: 05/10/22 0133    Specimen: Blood Updated: 05/10/22 0219     Troponin T <0.010 ng/mL     Narrative:      Troponin T Reference Range:  <= 0.03 ng/mL-   Negative for AMI  >0.03 ng/mL-     Abnormal for myocardial necrosis.  Clinicians would have to utilize clinical acumen, EKG, Troponin and serial changes to determine if it is an Acute Myocardial Infarction or myocardial injury due to an underlying chronic condition.       Results may be falsely decreased if patient taking Biotin.      MRSA Screen, PCR (Inpatient) - Swab, Nares [[id number]]  (Normal) Collected: 05/09/22 2040    Specimen: Swab from Nares Updated: 05/09/22 2221     MRSA PCR Negative     Staph aureus by PCR Negative    Mycoplasma Pneumoniae Antibody, IgM - Blood, Arm, Left [[id number]]  (Normal) Collected: 05/09/22 1948    Specimen: Blood from Arm, Left Updated: 05/09/22 2058     Mycoplasma pneumo IgM Negative    Troponin [[id number]]  (Normal) Collected: 05/09/22 1948    Specimen: Blood  "Updated: 05/09/22 2026     Troponin T <0.010 ng/mL     Narrative:      Troponin T Reference Range:  <= 0.03 ng/mL-   Negative for AMI  >0.03 ng/mL-     Abnormal for myocardial necrosis.  Clinicians would have to utilize clinical acumen, EKG, Troponin and serial changes to determine if it is an Acute Myocardial Infarction or myocardial injury due to an underlying chronic condition.       Results may be falsely decreased if patient taking Biotin.      Procalcitonin [[id number]]  (Normal) Collected: 05/09/22 1237    Specimen: Blood from Arm, Right Updated: 05/09/22 1846     Procalcitonin 0.06 ng/mL     Narrative:      As a Marker for Sepsis (Non-Neonates):    1. <0.5 ng/mL represents a low risk of severe sepsis and/or septic shock.  2. >2 ng/mL represents a high risk of severe sepsis and/or septic shock.    As a Marker for Lower Respiratory Tract Infections that require antibiotic therapy:    PCT on Admission    Antibiotic Therapy       6-12 Hrs later    >0.5                Strongly Recommended  >0.25 - <0.5        Recommended   0.1 - 0.25          Discouraged              Remeasure/reassess PCT  <0.1                Strongly Discouraged     Remeasure/reassess PCT    As 28 day mortality risk marker: \"Change in Procalcitonin Result\" (>80% or <=80%) if Day 0 (or Day 1) and Day 4 values are available. Refer to http://www.[email or web address]-pct-calculator.com    Change in PCT <=80%  A decrease of PCT levels below or equal to 80% defines a positive change in PCT test result representing a higher risk for 28-day all-cause mortality of patients diagnosed with severe sepsis for septic shock.    Change in PCT >80%  A decrease of PCT levels of more than 80% defines a negative change in PCT result representing a lower risk for 28-day all-cause mortality of patients diagnosed with severe sepsis or septic shock.       C-reactive Protein [[id number]]  (Abnormal) Collected: 05/09/22 1237    Specimen: Blood from Arm, Right Updated: 05/09/22 1830     " C-Reactive Protein 1.94 mg/dL     TSH [[id number]]  (Normal) Collected: 05/09/22 1237    Specimen: Blood from Arm, Right Updated: 05/09/22 1742     TSH 3.010 uIU/mL     Magnesium [[id number]]  (Normal) Collected: 05/09/22 1237    Specimen: Blood from Arm, Right Updated: 05/09/22 1730     Magnesium 2.2 mg/dL     Lipase [[id number]]  (Normal) Collected: 05/09/22 1237    Specimen: Blood from Arm, Right Updated: 05/09/22 1627     Lipase 17 U/L         Imaging Results (Last 24 Hours)     Procedure Component Value Units Date/Time    CT Chest Without Contrast Diagnostic [[id number]] Collected: 05/10/22 0857     Updated: 05/10/22 0900    Narrative:      EXAM:    CT Chest Without Intravenous Contrast     EXAM DATE:    5/10/2022 8:38 AM     CLINICAL HISTORY:    Lung nodule, CT recommended. Possible pneumonia as well.;  G93.41-Metabolic encephalopathy     TECHNIQUE:    Axial computed tomography images of the chest without intravenous  contrast.  Sagittal and coronal reformatted images were created and  reviewed.  This CT exam was performed using one or more of the following  dose reduction techniques:  automated exposure control, adjustment of  the mA and/or kV according to patient size, and/or use of iterative  reconstruction technique.     COMPARISON:    06/30/2020     FINDINGS:    LUNGS:  Bilateral subpleural faint groundglass opacities that may  represent residual scarring from previous pneumonia.    PLEURAL SPACE:  Unremarkable.  No pneumothorax.  No significant  effusion.    HEART:  Unremarkable.  No cardiomegaly.  No significant pericardial  effusion.  No significant coronary artery calcifications.    BONES/JOINTS:  Unremarkable.  No acute fracture.  No dislocation.    SOFT TISSUES:  Unremarkable.    VASCULATURE:  Unremarkable.  No thoracic aortic aneurysm.    LYMPH NODES:  Unremarkable.  No enlarged lymph nodes.       Impression:        Bilateral subpleural faint groundglass opacities that may represent  residual scarring  from previous pneumonia.     This report was finalized on 5/10/2022 8:57 AM by Dr. Allan Melchor MD.               Assessment      Altered mental status with visual hallucinations  -Patient had some visual hallucinations and some altered mental state when she initially presented but as the day has progressed, her mental state has improved.  She reports she is unsure what happened and denies any hallucinations prior to hospitalization and denies any currently.  I do suspect that patient may have some underlying substance abuse which she has not been forthcoming about.  She did not have a good explanation for why there were opiates in her system and there is no prescription reported on [facility].  She also has cannabis in his system when she was not forthcoming about and usually the dipstick urine drug screen performed in the ER does not show positive for benzodiazepines when patients are only taking clonazepam but is positive for benzos when other benzodiazepines are used.  It is likely that her medications or her substance use contributed in some way to her presentation but this has since resolved.  -Patient currently not having any major psychiatric symptom burden aside from anxiety about being in the hospital and psychotic symptoms have resolved.  Patient may follow-up with her regular outpatient provider for mental health services.    I discussed the patients findings and my recommendations with patient and family    Pernell Brooks MD  05/10/22  15:33 EDT

## 2022-05-10 NOTE — DISCHARGE SUMMARY
[facility] Medicine Services  DISCHARGE SUMMARY    Date of Admission: 5/9/2022    Date of Discharge:  5/10/2022    PCP: Eldon Christian PA    Discharging Provider: GABE Patten  Attending Physician on day of DC: Dr. Nancy Chauhan,     Admission Diagnosis:   Please see admission H&P    Discharge Diagnosis:   · Altered mental status, toxic vs. metabolic vs. hepatic encephalopathy; resolved  · Sinus bradycardia; improved   · Elevated ammonia level; resolved  · Abdominal pain  · Nausea  · Hypokalemia    Procedures Performed:  · Chest Xray   · CT Chest Without Contrast  · CT Head Without Contrast  · MRSA PCR Nasal Swab  · Strep Pneumo AG, Urine- Pending      Consults:   Consults     Date and Time Order Name Status Description    5/10/2022  9:48 AM Inpatient Psychiatrist Consult Completed           HPI     History of Present Illness:  Rani Martins is a 51 y.o. female who presented to [facility] Emergency Department on 05/09/22 with altered mental status, fatigue, and bradycardia. Ms. Martins's mother became concerned for her when she noticed her walking in the kitchen yesterday morning, appearing drowsy and displaying inappropriate judgement, insight, and memory. Patient's mother thought she was sleep-walking, but when she checked her pulse, she noticed that HR was in the 30's and decided to bring her to the ED for further evaluation.      Hospital Course     Hospital Course:     Rani Martins was admitted as outlined in above HPI. Upon arrival to the ED, it was found that patient's ammonia level was elevated (102). CT of the head was obtained, however, study was limited due to motion artifact. CT of the abdomen/pelvis was obtained which revealed left lower lobe nonspecific airspace disease; no acute abdominal findings. CT of the chest revealed bilateral subpleural groundglass opacities representing residual scarring from previous pneumonia. Urine drug screen was positive  "for benzodiazepines, opiates, and oxycodone. Patient was given a 1L normal saline fluid bolus, 2 mg IV narcan, 20g PO lactulose, and 0.4 mg robinul during ED course. WBC's within normal limits; procalcitonin also normal. C-RP slightly elevated at 3.78. Mycoplasma and MRSA PCR screen negative. Strep pneumo AG pending. Pneumonia felt to be ruled out, thus antibiotic therapy was not initiated. Patient has maintained O2 saturations well on room air during hospitalization.The patient did exhibit sinus bradycardia in the upper 30's initially in the ED, however HR improved to 60-70's after several hours without atropine administration. Serial troponins were negative. Please note the patient was previously admitted to our facility in August 2021 with symptomatic bradycardia into the 30s. At that time, she was found to have hypokalemia, hypomagnesemia, hypocalcemia. Electrolytes were replaced and bradycardia improved. She had a transthoracic echocardiogram at that time which showed EF of 66 to 70% and mild pulmonary pretension with RVSP 35 to 45 mmHg.  An event monitor was recommended, but does not appear to have been completed at that time.  Previous left heart catheterization in July 2020 showed normal coronary arteries. There was evidence of diastolic dysfunction. During this hospitalization, potassium was found to be low. Potassium replacement was provided. Magnesium and TSH were normal. Acute hepatitis panel was negative. She was placed on Lovenox for DVT prophylaxis and vital signs were monitored per hospital policy, which remained stable. The patient did report some auditory hallucinations, as she states that she heard voices coming through the air vent in the ceiling, but she thought it may have \"possibly just been the television\". Inpatient psychiatry consult was placed for evaluation of reported hallucinations and history of bipolar schizophrenia, generalized anxiety disorder, and panic attacks. Patient did report " "worsening anxiety and fear of \"lung cancer\" during hospitalization. She is recommended to follow-up in a couple of weeks with outpatient psychiatry for continued medication management and counseling. She also is recommended to follow-up with GI outpatient for EGD which has been scheduled in June for ongoing nausea/vomiting as CT abdomen revealed no acute findings. Recommended to also follow up with PCP in 3 days to monitor ammonia level, as well as CBC, CMP (potassium), and heart rate.       Discussed with AM MALACHI Mccann on day of discharge.     Telemetry on day of discharge was normal sinus, rate 70's.     Oxygen saturation on the day of discharge was 97-99% on room air.      Pertinent Laboratory and Radiology Results     Pertinent Test Results:          Results from last 7 days   Lab Units 05/10/22  0821 05/10/22  0706 05/09/22  1237   WBC 10*3/mm3 13.75*  --  7.80   HEMOGLOBIN g/dL 12.2  --  11.2*   HEMATOCRIT % 37.6  --  35.3   PLATELETS 10*3/mm3 262  --  258   MCV fL 91.5  --  94.6   SODIUM mmol/L  --  141 136   POTASSIUM mmol/L  --  3.3* 4.2   CHLORIDE mmol/L  --  106 103   CO2 mmol/L  --  23.6 28.8   BUN mg/dL  --  9 16   CREATININE mg/dL  --  0.66 0.87   GLUCOSE mg/dL  --  88 93   CALCIUM mg/dL  --  8.6 9.0        Results from last 7 days   Lab Units 05/10/22  0706 05/10/22  0133 05/09/22  1948   TROPONIN T ng/mL <0.010 <0.010 <0.010     Results from last 7 days   Lab Units 05/10/22  0821 05/10/22  0706 05/09/22  1237   CRP mg/dL  --  3.78* 1.94*   WBC 10*3/mm3 13.75*  --  7.80     Results from last 7 days   Lab Units 05/09/22  1237   BILIRUBIN mg/dL 0.3   ALK PHOS U/L 107   ALT (SGPT) U/L 71*   AST (SGOT) U/L 67*           Invalid input(s): TG, LDLCALC, LDLREALC  Results from last 7 days   Lab Units 05/09/22  1237   TSH uIU/mL 3.010     Brief Urine Lab Results  (Last result in the past 365 days)      Color   Clarity   Blood   Leuk Est   Nitrite   Protein   CREAT   Urine HCG        05/09/22 1320 Dark Yellow   " Clear   Negative   Negative   Negative   Trace                   Results for orders placed during the hospital encounter of 08/25/21    Adult Transthoracic Echo Complete w/ Color, Spectral and Contrast if necessary per protocol    Interpretation Summary  · Left ventricular ejection fraction appears to be 66 - 70%. Left ventricular systolic function is normal.  · Left ventricular diastolic function was normal.  · Estimated right ventricular systolic pressure from tricuspid regurgitation is mildly elevated (35-45 mmHg).      ----------------------------------------------------------------------------------------------------------------------  CT Abdomen Pelvis Without Contrast    Result Date: 5/9/2022    Left lower lobe nonspecific airspace disease.  This report was finalized on 5/9/2022 1:59 PM by Dr. Allan Melchor MD.      CT Head Without Contrast    Result Date: 5/9/2022    Limited by motion artifact near the vertex.  This report was finalized on 5/9/2022 2:00 PM by Dr. Allan Melchor MD.      CT Chest Without Contrast Diagnostic    Result Date: 5/10/2022    Bilateral subpleural faint groundglass opacities that may represent residual scarring from previous pneumonia.  This report was finalized on 5/10/2022 8:57 AM by Dr. Allan Melchor MD.      XR Chest 1 View    Result Date: 5/9/2022  1.  Mild cardiomegaly. 2.  Nodule of the right upper lobe that could be calcified but should be further evaluated with CT scan of the chest.  This report was finalized on 5/9/2022 1:43 PM by Dr. Allan Melchor MD.          Microbiology Results (last 10 days)     Procedure Component Value - Date/Time    S. Pneumo Ag Urine or CSF - Urine, Urine, Clean Catch [[id number]]  (Normal) Collected: 05/10/22 0113    Lab Status: Final result Specimen: Urine, Clean Catch Updated: 05/10/22 1306     Strep Pneumo Ag Negative    MRSA Screen, PCR (Inpatient) - Swab, Nares [[id number]]  (Normal) Collected: 05/09/22 2040    Lab Status: Final result Specimen:  Swab from Nares Updated: 05/09/22 2221     MRSA PCR Negative     Staph aureus by PCR Negative    Mycoplasma Pneumoniae Antibody, IgM - Blood, Arm, Left [[id number]]  (Normal) Collected: 05/09/22 1948    Lab Status: Final result Specimen: Blood from Arm, Left Updated: 05/09/22 2058     Mycoplasma pneumo IgM Negative    COVID-19 and FLU A/B PCR - Swab, Nasopharynx [[id number]]  (Normal) Collected: 05/09/22 1402    Lab Status: Final result Specimen: Swab from Nasopharynx Updated: 05/09/22 1507     COVID19 Not Detected     Influenza A PCR Not Detected     Influenza B PCR Not Detected    Narrative:      Fact sheet for providers: https://www.fda.gov/media/062811/download    Fact sheet for patients: https://www.fda.gov/media/182262/download    Test performed by PCR.        Labs above have been reviewed on the day of discharge.  Radiology images from prior 30 days were reviewed prior to discharge as incorporated into this document.     Discharge Vitals and Physical Examination       Vital Signs  Temp:  [98 °F (36.7 °C)-98.6 °F (37 °C)] 98 °F (36.7 °C)  Heart Rate:  [51-76] 69  Resp:  [18] 18  BP: (101-184)/(50-83) 184/83     PHYSICAL EXAMINATION:   Physical Exam  Vitals and nursing note reviewed.   Constitutional:       General: She is awake. She is not in acute distress.     Comments: unkempt   HENT:      Head: Normocephalic.      Right Ear: External ear normal.      Left Ear: External ear normal.      Mouth/Throat:      Mouth: Mucous membranes are moist.      Pharynx: Oropharynx is clear.   Eyes:      Pupils: Pupils are equal, round, and reactive to light.   Cardiovascular:      Rate and Rhythm: Normal rate and regular rhythm.      Pulses: Normal pulses.           Radial pulses are 2+ on the right side and 2+ on the left side.        Dorsalis pedis pulses are 2+ on the right side and 2+ on the left side.      Heart sounds: Normal heart sounds. No murmur heard.    No friction rub.   Pulmonary:      Effort: Pulmonary effort  is normal. No respiratory distress.      Breath sounds: Normal breath sounds. No wheezing or rhonchi.   Abdominal:      General: Bowel sounds are normal. There is no distension.      Palpations: Abdomen is soft. There is no mass.      Tenderness: There is no abdominal tenderness. There is no guarding.   Musculoskeletal:         General: No swelling.      Cervical back: Normal range of motion and neck supple.   Skin:     General: Skin is warm and dry.      Capillary Refill: Capillary refill takes less than 2 seconds.      Coloration: Skin is pale.   Neurological:      Mental Status: She is alert and oriented to person, place, and time.      Motor: No weakness.   Psychiatric:         Mood and Affect: Mood is anxious.         Speech: Speech normal.         Thought Content: Thought content does not include homicidal or suicidal ideation.         Cognition and Memory: She exhibits impaired recent memory.         Discharge Disposition, Discharge Medications, and Discharge Appointments     Discharge Disposition: Home with mother     Condition on Discharge: Fair    Discharge Medications:     Discharge Medications      Continue These Medications      Instructions Start Date   clonazePAM 0.5 MG tablet  Commonly known as: KlonoPIN   0.25 mg, Oral, 2 Times Daily PRN      hydrALAZINE 25 MG tablet  Commonly known as: APRESOLINE   12.5 mg, Oral, Every 8 Hours PRN, Prior to [facility] Admission, Patient was on:  Patient has this if she needs it but has not had to take it in around a year. Last picked up from pharmacy 05/04/22      levothyroxine 112 MCG tablet  Commonly known as: SYNTHROID, LEVOTHROID   112 mcg, Oral, Every Early Morning      promethazine 25 MG tablet  Commonly known as: PHENERGAN   25 mg, Oral, Every 6 Hours PRN      tiZANidine 4 MG tablet  Commonly known as: ZANAFLEX   4 mg, Oral, Every 8 Hours PRN      traZODone 150 MG tablet  Commonly known as: DESYREL   150 mg, Oral, Nightly             Discharged medication  regimen discussed with attending physician prior to discharge.     Discharge Diet: Regular     Dietary Orders (From admission, onward)     Start     Ordered    05/09/22 1929  Diet Regular  Diet Effective Now        Question:  Diet Texture / Consistency  Answer:  Regular    05/09/22 1928              Activity at Discharge: As tolerated    Discharge Disposition: Home with mother    Home or Self Care        Follow-up Appointments:  Your Scheduled Appointments    Jun 02, 2022  3:00 PM  New Patient with Staci Samaniego MD  [facility] GASTRO[facility] ([place]) [address]  [facility] [id number] [phone]   Please bring valid picture ID, current insurance cards, a list of all current medications and arrive 15 minutes early in order to complete paperwork. If referred by a provider and imaging has been completed, bring disc with images to appointment.        Additional instructions:    Follow up appt. with Eldon Christian is May17  @ 11:00am.           Additional Instructions for the Follow-ups that You Need to Schedule    For EGD scheduled in June.Follow up appt. with Dr. Casillas is June 2nd @ 3:00pm.    Follow up appt. with Eldon Christian is May17th @ 11:00am.        Follow-up Information     Eldon Christian, PA. Schedule an appointment as soon as possible for a visit in 3 day(s).    Specialty: Physician Assistant  Why: Follow up appt. with Eldon Christian is May17th @ 11:00am.  Contact information:  [address] [facility] [address]  [phone]             Staci Samaniego MD Follow up in 1 week(s).    Specialty: Gastroenterology  Why: For EGD scheduled in June.Follow up appt. with Dr. Casillas is June 2nd @ 3:00pm.  Contact information:  [address]  [facility] [address]  [phone]             Maeve Allison, APRN. Schedule an appointment as soon as possible for a visit in 2 week(s).    Specialties: Nurse Practitioner, Psychiatry, Behavioral  Health  Why: for management of bipolar schizophrenia, TERESA and panic attacks  Contact information:  [address] [place] [address]  [phone]                         Additional Instructions for the Follow-ups that You Need to Schedule    For EGD scheduled in June.Follow up appt. with Dr. Casillas is June 2nd @ 3:00pm.    Follow up appt. with Eldon Christian is May17th @ 11:00am.           Test Results Pending at Discharge:  -Strep Pneumo AG, urine  -Sputum Culture       [facility]  05/10/22  16:18 EDT      Time: Greater than 30 minutes spent on this discharge. I spent 70 minutes on this discharge activity which included:  Face-to-face encounter with the patient, discussing plan with attending physician, reviewing the data in the system, coordination of the care with the nursing staff as well as consultations, documentation, and entering orders.

## 2022-05-10 NOTE — DISCHARGE INSTRUCTIONS
Return to ED for further evaluation for altered mental status and confusion, chest pain, shortness of breath, or respiratory distress.

## 2022-05-10 NOTE — CASE MANAGEMENT/SOCIAL WORK
Discharge Planning Assessment  [facility]     Patient Name: Rani Martins  MRN: [id number]  Today's Date: 5/10/2022    Admit Date: 5/9/2022     Discharge Needs Assessment     Row Name 05/10/22 1623       Living Environment    People in Home parent(s)    Current Living Arrangements home    Primary Care Provided by self    Provides Primary Care For no one    Family Caregiver if Needed parent(s)    Quality of Family Relationships helpful;involved;supportive    Able to Return to Prior Arrangements yes       Resource/Environmental Concerns    Resource/Environmental Concerns none       Transition Planning    Patient/Family Anticipates Transition to home with family    Transportation Anticipated family or friend will provide       Discharge Needs Assessment    Equipment Currently Used at Home none               Discharge Plan     Row Name 05/10/22 1624       Plan    Plan Pt. admitted 5/9/22. SS received consult for discharge planning. SS spoke with pt's mother Rossana on this date. Pt. lives with her parents. Pt's PCP is Rigoberto Christian. No HH/DME. Pt does not have a POA/livnig will. Pt. utilizes [facility]. Pt's family to provide transportation at discharge. Pt. home at discharge. SS to follow.    Final Discharge Disposition Code 01 - home or self-care    Final Note Pt. is being discharged home on this date. Pt's mother to provide transportation. No other SS needs identified.            MARY Avila

## 2022-05-10 NOTE — DISCHARGE INSTR - LAB
For EGD scheduled in June.Follow up appt. with Dr. Casillas is June 2nd @ 3:00pm.    Follow up appt. with Eldon Christian is May17th @ 11:00am.    for management of bipolar schizophrenia, TERESA and panic attacks. Follow up appt. with Maeve Allison is May 31st. @ 4:00 pm.

## 2022-05-10 NOTE — PLAN OF CARE
"Goal Outcome Evaluation:  Plan of Care Reviewed With: patient           Outcome Evaluation: Pt has been resting in bed this shift. Pt has become tearful a few times throughout shift, stating she knew she had cancer. I explained to pt that no diagnosis has been confirmed, it was out of my scope of practice to read scans, and the doctor would further discuss her plan of care tomorrow. She also stated that she \"hears voices in her room sometimes\". I asked her where they were coming from and she pointed to the corner of the room. I asked pt if it happened at home as well, and she said \"no, just here at the hospital\". Pt denies any suicidal ideation at this time. VSS at this time. No s/s of acute distress noted at this time.  " No

## 2022-05-10 NOTE — DISCHARGE INSTR - APPOINTMENTS
Follow up appt. with Eldon Christian is May17  @ 11:00am.    for management of bipolar schizophrenia, TERESA and panic attacks. Follow up appt. with Maeve Allison is May 31st. @ 4:00 pm.    For EGD scheduled in June.Follow up appt. with Dr. Casillas is June 2nd @ 3:00pm.

## 2022-05-12 ENCOUNTER — TELEPHONE (OUTPATIENT)
Dept: CARDIOLOGY | Facility: CLINIC | Age: 52
End: 2022-05-12

## 2022-06-02 ENCOUNTER — OFFICE VISIT (OUTPATIENT)
Dept: GASTROENTEROLOGY | Facility: CLINIC | Age: 52
End: 2022-06-02

## 2022-06-02 VITALS
OXYGEN SATURATION: 96 % | DIASTOLIC BLOOD PRESSURE: 99 MMHG | SYSTOLIC BLOOD PRESSURE: 174 MMHG | HEART RATE: 108 BPM | BODY MASS INDEX: 27.38 KG/M2 | HEIGHT: 61 IN | WEIGHT: 145 LBS

## 2022-06-02 DIAGNOSIS — R11.2 NAUSEA AND VOMITING, UNSPECIFIED VOMITING TYPE: ICD-10-CM

## 2022-06-02 DIAGNOSIS — Z12.11 SCREENING FOR COLORECTAL CANCER: Primary | ICD-10-CM

## 2022-06-02 DIAGNOSIS — Z12.12 SCREENING FOR COLORECTAL CANCER: Primary | ICD-10-CM

## 2022-06-02 PROCEDURE — 99204 OFFICE O/P NEW MOD 45 MIN: CPT | Performed by: INTERNAL MEDICINE

## 2022-06-02 RX ORDER — BISACODYL 5 MG
TABLET, DELAYED RELEASE (ENTERIC COATED) ORAL
Qty: 4 TABLET | Refills: 0 | Status: SHIPPED | OUTPATIENT
Start: 2022-06-02 | End: 2022-06-29

## 2022-06-02 NOTE — PROGRESS NOTES
"Subjective     Rani Martins is a 51 y.o. female who presents to the office today as a consultation from GABE Cazares for evaluation of Nausea and Vomiting        History of Present Illness:  The patient presents for evaluation of chronic nausea and intermittent vomiting.  She has had issues for about 2 years.  There is associated anxiety.  She denies heartburn or reflux.  She has not had her gallbladder removed.  She does have some epigastric and lower abdominal pain.  She states she feels \"like my guts are on fire\".  She has had an EGD in the past.  She thinks she has had an EGD in past.  She has not worked in over a year because a year.  She may have lost about 10 lbs.  Her father  in 2016. Smells and \"seeing something gross\" brings on the nausea.  She seems to tolerate carnation instant breakfast.  She vomited blood once but her nose had been bleeding.  She feels that her anxiety makes her nausea worse. She is able to eat most of the time but there are some days she can't eat.  She is on Phenergan and Zofran. Sometimes this helpful and sometimes it does not. She denies constipation.  She has several  bowel movements a day. Her stool is a George score 4.  She has not had a screening colonoscopy. No family history of colon cancer.      Review of Systems:  Review of Systems   Constitutional: Negative for activity change, fatigue and unexpected weight change.   HENT: Positive for sore throat. Negative for trouble swallowing.    Respiratory: Negative for chest tightness.    Cardiovascular: Negative for chest pain.   Gastrointestinal: Positive for abdominal distention, abdominal pain, nausea and vomiting. Negative for anal bleeding, blood in stool, constipation, diarrhea and rectal pain.   Genitourinary: Negative for difficulty urinating.   Musculoskeletal: Positive for back pain and neck pain.   Skin: Positive for pallor.   Allergic/Immunologic: Negative for environmental allergies and food allergies. "   Neurological: Positive for headaches. Negative for dizziness.   Hematological: Bruises/bleeds easily.   Psychiatric/Behavioral: Positive for agitation and sleep disturbance. Negative for confusion, self-injury and suicidal ideas. The patient is nervous/anxious. The patient is not hyperactive.        Past Medical History:  Past Medical History:   Diagnosis Date   • Anemia    • Anxiety    • Bipolar disorder (HCC)    • Depression    • Fibromyalgia        Past Surgical History:  Past Surgical History:   Procedure Laterality Date   • BREAST BIOPSY Left 1997    benign   • CARDIAC CATHETERIZATION N/A 07/03/2020    Procedure: Left Heart Cath;  Surgeon: Ludmila Murray MD;  Location: [facility] CATH INVASIVE LOCATION;  Service: Cardiovascular;  Laterality: N/A;   • HYSTERECTOMY      30   • UPPER GASTROINTESTINAL ENDOSCOPY         Family History:  Family History   Problem Relation Age of Onset   • Heart disease Mother    • Anxiety disorder Mother    • Depression Mother    • Throat cancer Father        Social History:  Social History     Socioeconomic History   • Marital status:    Tobacco Use   • Smoking status: Never Smoker   • Smokeless tobacco: Never Used   Substance and Sexual Activity   • Alcohol use: No   • Drug use: No   • Sexual activity: Defer       Current Medication List:    Current Outpatient Medications:   •  clonazePAM (KlonoPIN) 0.5 MG tablet, Take 0.25 mg by mouth 2 (Two) Times a Day As Needed., Disp: , Rfl:   •  promethazine (PHENERGAN) 25 MG tablet, Take 25 mg by mouth Every 6 (Six) Hours As Needed for Nausea or Vomiting., Disp: , Rfl:   •  tiZANidine (ZANAFLEX) 4 MG tablet, Take 4 mg by mouth Every 8 (Eight) Hours As Needed for Muscle Spasms., Disp: , Rfl:   •  traZODone (DESYREL) 150 MG tablet, Take 150 mg by mouth Every Night., Disp: , Rfl:   •  hydrALAZINE (APRESOLINE) 25 MG tablet, Take 12.5 mg by mouth Every 8 (Eight) Hours As Needed. Prior to [facility] Admission, Patient was on:  Patient has this  "if she needs it but has not had to take it in around a year. Last picked up from pharmacy 05/04/22, Disp: , Rfl:   •  levothyroxine (SYNTHROID, LEVOTHROID) 112 MCG tablet, Take 1 tablet by mouth Every Morning., Disp: 30 tablet, Rfl: 0    Allergies:   Lyrica [pregabalin], Cymbalta [duloxetine hcl], Erythromycin, Lithium, Toradol [ketorolac tromethamine], Tramadol, and Penicillins    Vitals:  /99   Pulse 108   Ht 154.9 cm (61\")   Wt 65.8 kg (145 lb)   SpO2 96%   BMI 27.40 kg/m²     Physical Exam:  Physical Exam  Constitutional:       Appearance: She is normal weight.   HENT:      Head: Normocephalic and atraumatic.      Nose: Nose normal. No congestion or rhinorrhea.   Eyes:      General: No scleral icterus.     Extraocular Movements: Extraocular movements intact.      Conjunctiva/sclera: Conjunctivae normal.      Pupils: Pupils are equal, round, and reactive to light.   Cardiovascular:      Rate and Rhythm: Normal rate and regular rhythm.      Pulses: Normal pulses.      Heart sounds: Normal heart sounds.   Pulmonary:      Effort: Pulmonary effort is normal.      Breath sounds: Normal breath sounds.   Abdominal:      General: Abdomen is flat. Bowel sounds are normal. There is no distension.      Palpations: Abdomen is soft. There is no shifting dullness, fluid wave, hepatomegaly, splenomegaly, mass or pulsatile mass.      Tenderness: There is no abdominal tenderness. There is no guarding or rebound.      Hernia: No hernia is present.   Musculoskeletal:         General: No swelling or tenderness.      Cervical back: Normal range of motion and neck supple.   Skin:     General: Skin is warm and dry.      Coloration: Skin is not jaundiced.   Neurological:      General: No focal deficit present.      Mental Status: She is alert and oriented to person, place, and time.   Psychiatric:         Mood and Affect: Mood normal.         Behavior: Behavior normal.         Results Review:  Lab Results:   Admission on " 05/09/2022, Discharged on 05/10/2022   Component Date Value Ref Range Status   • Glucose 05/09/2022 93  65 - 99 mg/dL Final   • BUN 05/09/2022 16  6 - 20 mg/dL Final   • Creatinine 05/09/2022 0.87  0.57 - 1.00 mg/dL Final   • Sodium 05/09/2022 136  136 - 145 mmol/L Final   • Potassium 05/09/2022 4.2  3.5 - 5.2 mmol/L Final   • Chloride 05/09/2022 103  98 - 107 mmol/L Final   • CO2 05/09/2022 28.8  22.0 - 29.0 mmol/L Final   • Calcium 05/09/2022 9.0  8.6 - 10.5 mg/dL Final   • Total Protein 05/09/2022 6.2  6.0 - 8.5 g/dL Final   • Albumin 05/09/2022 3.71  3.50 - 5.20 g/dL Final   • ALT (SGPT) 05/09/2022 71 (A) 1 - 33 U/L Final   • AST (SGOT) 05/09/2022 67 (A) 1 - 32 U/L Final   • Alkaline Phosphatase 05/09/2022 107  39 - 117 U/L Final   • Total Bilirubin 05/09/2022 0.3  0.0 - 1.2 mg/dL Final   • Globulin 05/09/2022 2.5  gm/dL Final   • A/G Ratio 05/09/2022 1.5  g/dL Final   • BUN/Creatinine Ratio 05/09/2022 18.4  7.0 - 25.0 Final   • Anion Gap 05/09/2022 4.2 (A) 5.0 - 15.0 mmol/L Final   • eGFR 05/09/2022 81.8  >60.0 mL/min/1.73 Final    National Kidney Foundation and American Society of Nephrology (ASN) Task Force recommended calculation based on the Chronic Kidney Disease Epidemiology Collaboration (CKD-EPI) equation refit without adjustment for race.   • Color, UA 05/09/2022 Dark Yellow (A) Yellow, Straw Final   • Appearance, UA 05/09/2022 Clear  Clear Final   • pH, UA 05/09/2022 5.5  5.0 - 8.0 Final   • Specific Gravity, UA 05/09/2022 >1.030 (A) 1.005 - 1.030 Final   • Glucose, UA 05/09/2022 Negative  Negative Final   • Ketones, UA 05/09/2022 Negative  Negative Final   • Bilirubin, UA 05/09/2022 Negative  Negative Final   • Blood, UA 05/09/2022 Negative  Negative Final   • Protein, UA 05/09/2022 Trace (A) Negative Final   • Leuk Esterase, UA 05/09/2022 Negative  Negative Final   • Nitrite, UA 05/09/2022 Negative  Negative Final   • Urobilinogen, UA 05/09/2022 1.0 E.U./dL  0.2 - 1.0 E.U./dL Final   • HCG  Qualitative 05/09/2022 Negative  Negative Final   • THC, Screen, Urine 05/09/2022 Positive (A) Negative Final   • Phencyclidine (PCP), Urine 05/09/2022 Negative  Negative Final   • Cocaine Screen, Urine 05/09/2022 Negative  Negative Final   • Methamphetamine, Ur 05/09/2022 Negative  Negative Final   • Opiate Screen 05/09/2022 Positive (A) Negative Final   • Amphetamine Screen, Urine 05/09/2022 Negative  Negative Final   • Benzodiazepine Screen, Urine 05/09/2022 Positive (A) Negative Final   • Tricyclic Antidepressants Screen 05/09/2022 Negative  Negative Final   • Methadone Screen, Urine 05/09/2022 Negative  Negative Final   • Barbiturates Screen, Urine 05/09/2022 Negative  Negative Final   • Oxycodone Screen, Urine 05/09/2022 Positive (A) Negative Final   • Propoxyphene Screen 05/09/2022 Negative  Negative Final   • Buprenorphine, Screen, Urine 05/09/2022 Negative  Negative Final   • Ethanol 05/09/2022 <10  0 - 10 mg/dL Final   • Ethanol % 05/09/2022 <0.010  % Final   • Ammonia 05/09/2022 102 (A) 11 - 51 umol/L Final   • Extra Tube 05/09/2022 Hold for add-ons.   Final    Auto resulted.   • Extra Tube 05/09/2022 hold for add-on   Final    Auto resulted   • Extra Tube 05/09/2022 hold for add-on   Final    Auto resulted   • WBC 05/09/2022 7.80  3.40 - 10.80 10*3/mm3 Final   • RBC 05/09/2022 3.73 (A) 3.77 - 5.28 10*6/mm3 Final   • Hemoglobin 05/09/2022 11.2 (A) 12.0 - 15.9 g/dL Final   • Hematocrit 05/09/2022 35.3  34.0 - 46.6 % Final   • MCV 05/09/2022 94.6  79.0 - 97.0 fL Final   • MCH 05/09/2022 30.0  26.6 - 33.0 pg Final   • MCHC 05/09/2022 31.7  31.5 - 35.7 g/dL Final   • RDW 05/09/2022 13.2  12.3 - 15.4 % Final   • RDW-SD 05/09/2022 45.1  37.0 - 54.0 fl Final   • MPV 05/09/2022 10.9  6.0 - 12.0 fL Final   • Platelets 05/09/2022 258  140 - 450 10*3/mm3 Final   • Neutrophil % 05/09/2022 33.4 (A) 42.7 - 76.0 % Final   • Lymphocyte % 05/09/2022 46.9 (A) 19.6 - 45.3 % Final   • Monocyte % 05/09/2022 7.7  5.0 - 12.0 %  Final   • Eosinophil % 05/09/2022 10.5 (A) 0.3 - 6.2 % Final   • Basophil % 05/09/2022 1.2  0.0 - 1.5 % Final   • Immature Grans % 05/09/2022 0.3  0.0 - 0.5 % Final   • Neutrophils, Absolute 05/09/2022 2.61  1.70 - 7.00 10*3/mm3 Final   • Lymphocytes, Absolute 05/09/2022 3.66 (A) 0.70 - 3.10 10*3/mm3 Final   • Monocytes, Absolute 05/09/2022 0.60  0.10 - 0.90 10*3/mm3 Final   • Eosinophils, Absolute 05/09/2022 0.82 (A) 0.00 - 0.40 10*3/mm3 Final   • Basophils, Absolute 05/09/2022 0.09  0.00 - 0.20 10*3/mm3 Final   • Immature Grans, Absolute 05/09/2022 0.02  0.00 - 0.05 10*3/mm3 Final   • nRBC 05/09/2022 0.0  0.0 - 0.2 /100 WBC Final   • Glucose 05/09/2022 111  70 - 130 mg/dL Final    Meter: [id number] : [id number] brittany elizalde   • COVID19 05/09/2022 Not Detected  Not Detected - Ref. Range Final   • Influenza A PCR 05/09/2022 Not Detected  Not Detected Final   • Influenza B PCR 05/09/2022 Not Detected  Not Detected Final   • QT Interval 05/09/2022 554  ms Final   • QTC Interval 05/09/2022 416  ms Final   • QT Interval 05/09/2022 454  ms Final   • QTC Interval 05/09/2022 449  ms Final   • Lipase 05/09/2022 17  13 - 60 U/L Final   • Magnesium 05/09/2022 2.2  1.6 - 2.6 mg/dL Final   • TSH 05/09/2022 3.010  0.270 - 4.200 uIU/mL Final   • C-Reactive Protein 05/09/2022 1.94 (A) 0.00 - 0.50 mg/dL Final   • Procalcitonin 05/09/2022 0.06  0.00 - 0.25 ng/mL Final   • Troponin T 05/09/2022 <0.010  0.000 - 0.030 ng/mL Final   • Troponin T 05/10/2022 <0.010  0.000 - 0.030 ng/mL Final   • Mycoplasma pneumo IgM 05/09/2022 Negative  Negative Final   • Strep Pneumo Ag 05/10/2022 Negative  Negative Final   • MRSA PCR 05/09/2022 Negative  Negative Final   • Staph aureus by PCR 05/09/2022 Negative  Negative Final   • Hepatitis B Surface Ag 05/10/2022 Non-Reactive  Non-Reactive Final   • Hep A IgM 05/10/2022 Non-Reactive  Non-Reactive Final   • Hep B C IgM 05/10/2022 Non-Reactive  Non-Reactive Final   • Hepatitis C Ab 05/10/2022  Non-Reactive  Non-Reactive Final   • Troponin T 05/10/2022 <0.010  0.000 - 0.030 ng/mL Final   • Glucose 05/10/2022 88  65 - 99 mg/dL Final   • BUN 05/10/2022 9  6 - 20 mg/dL Final   • Creatinine 05/10/2022 0.66  0.57 - 1.00 mg/dL Final   • Sodium 05/10/2022 141  136 - 145 mmol/L Final   • Potassium 05/10/2022 3.3 (A) 3.5 - 5.2 mmol/L Final   • Chloride 05/10/2022 106  98 - 107 mmol/L Final   • CO2 05/10/2022 23.6  22.0 - 29.0 mmol/L Final   • Calcium 05/10/2022 8.6  8.6 - 10.5 mg/dL Final   • BUN/Creatinine Ratio 05/10/2022 13.6  7.0 - 25.0 Final   • Anion Gap 05/10/2022 11.4  5.0 - 15.0 mmol/L Final   • eGFR 05/10/2022 107.7  >60.0 mL/min/1.73 Final    National Kidney Foundation and American Society of Nephrology (ASN) Task Force recommended calculation based on the Chronic Kidney Disease Epidemiology Collaboration (CKD-EPI) equation refit without adjustment for race.   • WBC 05/10/2022 13.75 (A) 3.40 - 10.80 10*3/mm3 Final   • RBC 05/10/2022 4.11  3.77 - 5.28 10*6/mm3 Final   • Hemoglobin 05/10/2022 12.2  12.0 - 15.9 g/dL Final   • Hematocrit 05/10/2022 37.6  34.0 - 46.6 % Final   • MCV 05/10/2022 91.5  79.0 - 97.0 fL Final   • MCH 05/10/2022 29.7  26.6 - 33.0 pg Final   • MCHC 05/10/2022 32.4  31.5 - 35.7 g/dL Final   • RDW 05/10/2022 12.8  12.3 - 15.4 % Final   • RDW-SD 05/10/2022 42.7  37.0 - 54.0 fl Final   • MPV 05/10/2022 10.8  6.0 - 12.0 fL Final   • Platelets 05/10/2022 262  140 - 450 10*3/mm3 Final   • Neutrophil % 05/10/2022 63.1  42.7 - 76.0 % Final   • Lymphocyte % 05/10/2022 24.7  19.6 - 45.3 % Final   • Monocyte % 05/10/2022 5.7  5.0 - 12.0 % Final   • Eosinophil % 05/10/2022 5.5  0.3 - 6.2 % Final   • Basophil % 05/10/2022 0.5  0.0 - 1.5 % Final   • Immature Grans % 05/10/2022 0.5  0.0 - 0.5 % Final   • Neutrophils, Absolute 05/10/2022 8.68 (A) 1.70 - 7.00 10*3/mm3 Final   • Lymphocytes, Absolute 05/10/2022 3.39 (A) 0.70 - 3.10 10*3/mm3 Final   • Monocytes, Absolute 05/10/2022 0.79  0.10 - 0.90  10*3/mm3 Final   • Eosinophils, Absolute 05/10/2022 0.75 (A) 0.00 - 0.40 10*3/mm3 Final   • Basophils, Absolute 05/10/2022 0.07  0.00 - 0.20 10*3/mm3 Final   • Immature Grans, Absolute 05/10/2022 0.07 (A) 0.00 - 0.05 10*3/mm3 Final   • nRBC 05/10/2022 0.0  0.0 - 0.2 /100 WBC Final   • C-Reactive Protein 05/10/2022 3.78 (A) 0.00 - 0.50 mg/dL Final   • Ammonia 05/10/2022 24  11 - 51 umol/L Final       Assessment & Plan     Visit Diagnoses:    ICD-10-CM ICD-9-CM   1. Nausea and vomiting, unspecified vomiting type  R11.2 787.01   2. Screening for colorectal cancer  Z12.11 V76.51    Z12.12 V76.41       Plan:  The patient will undergo EGD for chronic nausea.  If this is negative, she will undergo US RUQ and possibly HIDA scan.  Screening colonoscopy.     ESOPHAGOGASTRODUODENOSCOPY WITH BIOPSY (N/A), COLONOSCOPY FOR SCREENING (N/A)      MEDS ORDERED DURING VISIT:  No orders of the defined types were placed in this encounter.      No follow-ups on file.             This document has been electronically signed by Staci Samaniego MD   June 2, 2022 15:16 EDT        Part of this note may be an electronic transcription/translation of spoken language to printed text using the Dragon Dictation System.

## 2022-06-06 DIAGNOSIS — Z12.12 SCREENING FOR COLORECTAL CANCER: Primary | ICD-10-CM

## 2022-06-06 DIAGNOSIS — Z12.11 SCREENING FOR COLORECTAL CANCER: Primary | ICD-10-CM

## 2022-06-06 DIAGNOSIS — R11.2 NAUSEA AND VOMITING, UNSPECIFIED VOMITING TYPE: ICD-10-CM

## 2022-06-17 PROBLEM — Z12.12 SCREENING FOR COLORECTAL CANCER: Status: ACTIVE | Noted: 2022-06-17

## 2022-06-17 PROBLEM — Z12.11 SCREENING FOR COLORECTAL CANCER: Status: ACTIVE | Noted: 2022-06-17

## 2022-06-17 PROBLEM — R11.2 NAUSEA AND VOMITING: Status: ACTIVE | Noted: 2022-06-17

## 2022-06-29 ENCOUNTER — OFFICE VISIT (OUTPATIENT)
Dept: PSYCHIATRY | Facility: CLINIC | Age: 52
End: 2022-06-29

## 2022-06-29 VITALS
WEIGHT: 151 LBS | SYSTOLIC BLOOD PRESSURE: 176 MMHG | DIASTOLIC BLOOD PRESSURE: 98 MMHG | TEMPERATURE: 97.7 F | HEIGHT: 61 IN | BODY MASS INDEX: 28.51 KG/M2 | HEART RATE: 76 BPM | OXYGEN SATURATION: 98 %

## 2022-06-29 DIAGNOSIS — F25.0 SCHIZOAFFECTIVE DISORDER, BIPOLAR TYPE: Primary | ICD-10-CM

## 2022-06-29 PROCEDURE — 90792 PSYCH DIAG EVAL W/MED SRVCS: CPT

## 2022-06-29 RX ORDER — OLANZAPINE 5 MG/1
TABLET, ORALLY DISINTEGRATING ORAL
Qty: 53 TABLET | Refills: 0 | Status: ON HOLD | OUTPATIENT
Start: 2022-06-29 | End: 2022-08-19

## 2022-07-04 ENCOUNTER — LAB (OUTPATIENT)
Dept: LAB | Facility: HOSPITAL | Age: 52
End: 2022-07-04

## 2022-07-04 DIAGNOSIS — R11.2 NAUSEA AND VOMITING, UNSPECIFIED VOMITING TYPE: ICD-10-CM

## 2022-07-04 DIAGNOSIS — Z12.12 SCREENING FOR COLORECTAL CANCER: ICD-10-CM

## 2022-07-04 DIAGNOSIS — Z12.11 SCREENING FOR COLORECTAL CANCER: ICD-10-CM

## 2022-07-04 LAB — SARS-COV-2 RNA PNL SPEC NAA+PROBE: NOT DETECTED

## 2022-07-04 PROCEDURE — U0004 COV-19 TEST NON-CDC HGH THRU: HCPCS

## 2022-07-04 PROCEDURE — C9803 HOPD COVID-19 SPEC COLLECT: HCPCS

## 2022-07-05 ENCOUNTER — ANESTHESIA EVENT (OUTPATIENT)
Dept: PERIOP | Facility: HOSPITAL | Age: 52
End: 2022-07-05

## 2022-07-06 ENCOUNTER — ANESTHESIA (OUTPATIENT)
Dept: PERIOP | Facility: HOSPITAL | Age: 52
End: 2022-07-06

## 2022-07-06 ENCOUNTER — HOSPITAL ENCOUNTER (OUTPATIENT)
Facility: HOSPITAL | Age: 52
Setting detail: HOSPITAL OUTPATIENT SURGERY
Discharge: HOME | End: 2022-07-06
Attending: INTERNAL MEDICINE | Admitting: INTERNAL MEDICINE

## 2022-07-06 VITALS
SYSTOLIC BLOOD PRESSURE: 181 MMHG | WEIGHT: 150 LBS | DIASTOLIC BLOOD PRESSURE: 98 MMHG | HEIGHT: 61 IN | BODY MASS INDEX: 28.32 KG/M2 | RESPIRATION RATE: 18 BRPM | HEART RATE: 71 BPM | TEMPERATURE: 97.1 F | OXYGEN SATURATION: 97 %

## 2022-07-06 DIAGNOSIS — R11.2 NAUSEA AND VOMITING, UNSPECIFIED VOMITING TYPE: ICD-10-CM

## 2022-07-06 DIAGNOSIS — Z12.11 SCREENING FOR COLORECTAL CANCER: ICD-10-CM

## 2022-07-06 DIAGNOSIS — Z12.12 SCREENING FOR COLORECTAL CANCER: ICD-10-CM

## 2022-07-06 PROCEDURE — 25010000002 MIDAZOLAM PER 1 MG: Performed by: ANESTHESIOLOGY

## 2022-07-06 PROCEDURE — 25010000002 MIDAZOLAM PER 1 MG: Performed by: NURSE ANESTHETIST, CERTIFIED REGISTERED

## 2022-07-06 PROCEDURE — 25010000002 PROPOFOL 10 MG/ML EMULSION: Performed by: NURSE ANESTHETIST, CERTIFIED REGISTERED

## 2022-07-06 PROCEDURE — 43239 EGD BIOPSY SINGLE/MULTIPLE: CPT | Performed by: INTERNAL MEDICINE

## 2022-07-06 PROCEDURE — 45378 DIAGNOSTIC COLONOSCOPY: CPT | Performed by: INTERNAL MEDICINE

## 2022-07-06 RX ORDER — SODIUM CHLORIDE 0.9 % (FLUSH) 0.9 %
10 SYRINGE (ML) INJECTION EVERY 12 HOURS SCHEDULED
Status: DISCONTINUED | OUTPATIENT
Start: 2022-07-06 | End: 2022-07-06 | Stop reason: HOSPADM

## 2022-07-06 RX ORDER — IPRATROPIUM BROMIDE AND ALBUTEROL SULFATE 2.5; .5 MG/3ML; MG/3ML
3 SOLUTION RESPIRATORY (INHALATION) ONCE AS NEEDED
Status: DISCONTINUED | OUTPATIENT
Start: 2022-07-06 | End: 2022-07-06 | Stop reason: HOSPADM

## 2022-07-06 RX ORDER — PROPOFOL 10 MG/ML
VIAL (ML) INTRAVENOUS AS NEEDED
Status: DISCONTINUED | OUTPATIENT
Start: 2022-07-06 | End: 2022-07-06 | Stop reason: SURG

## 2022-07-06 RX ORDER — MEPERIDINE HYDROCHLORIDE 25 MG/ML
12.5 INJECTION INTRAMUSCULAR; INTRAVENOUS; SUBCUTANEOUS
Status: DISCONTINUED | OUTPATIENT
Start: 2022-07-06 | End: 2022-07-06 | Stop reason: HOSPADM

## 2022-07-06 RX ORDER — PANTOPRAZOLE SODIUM 40 MG/1
40 TABLET, DELAYED RELEASE ORAL DAILY
Qty: 90 TABLET | Refills: 3 | Status: SHIPPED | OUTPATIENT
Start: 2022-07-06

## 2022-07-06 RX ORDER — MIDAZOLAM HYDROCHLORIDE 1 MG/ML
1 INJECTION INTRAMUSCULAR; INTRAVENOUS
Status: COMPLETED | OUTPATIENT
Start: 2022-07-06 | End: 2022-07-06

## 2022-07-06 RX ORDER — ONDANSETRON 2 MG/ML
4 INJECTION INTRAMUSCULAR; INTRAVENOUS AS NEEDED
Status: DISCONTINUED | OUTPATIENT
Start: 2022-07-06 | End: 2022-07-06 | Stop reason: HOSPADM

## 2022-07-06 RX ORDER — SODIUM CHLORIDE, SODIUM LACTATE, POTASSIUM CHLORIDE, CALCIUM CHLORIDE 600; 310; 30; 20 MG/100ML; MG/100ML; MG/100ML; MG/100ML
125 INJECTION, SOLUTION INTRAVENOUS ONCE
Status: COMPLETED | OUTPATIENT
Start: 2022-07-06 | End: 2022-07-06

## 2022-07-06 RX ORDER — SODIUM CHLORIDE, SODIUM LACTATE, POTASSIUM CHLORIDE, CALCIUM CHLORIDE 600; 310; 30; 20 MG/100ML; MG/100ML; MG/100ML; MG/100ML
100 INJECTION, SOLUTION INTRAVENOUS ONCE AS NEEDED
Status: DISCONTINUED | OUTPATIENT
Start: 2022-07-06 | End: 2022-07-06 | Stop reason: HOSPADM

## 2022-07-06 RX ORDER — SODIUM CHLORIDE 0.9 % (FLUSH) 0.9 %
10 SYRINGE (ML) INJECTION AS NEEDED
Status: DISCONTINUED | OUTPATIENT
Start: 2022-07-06 | End: 2022-07-06 | Stop reason: HOSPADM

## 2022-07-06 RX ORDER — FENTANYL CITRATE 50 UG/ML
50 INJECTION, SOLUTION INTRAMUSCULAR; INTRAVENOUS
Status: DISCONTINUED | OUTPATIENT
Start: 2022-07-06 | End: 2022-07-06 | Stop reason: HOSPADM

## 2022-07-06 RX ORDER — MIDAZOLAM HYDROCHLORIDE 1 MG/ML
INJECTION INTRAMUSCULAR; INTRAVENOUS AS NEEDED
Status: DISCONTINUED | OUTPATIENT
Start: 2022-07-06 | End: 2022-07-06 | Stop reason: SURG

## 2022-07-06 RX ORDER — LIDOCAINE HYDROCHLORIDE 20 MG/ML
INJECTION, SOLUTION INFILTRATION; PERINEURAL AS NEEDED
Status: DISCONTINUED | OUTPATIENT
Start: 2022-07-06 | End: 2022-07-06 | Stop reason: SURG

## 2022-07-06 RX ADMIN — SODIUM CHLORIDE, POTASSIUM CHLORIDE, SODIUM LACTATE AND CALCIUM CHLORIDE: 600; 310; 30; 20 INJECTION, SOLUTION INTRAVENOUS at 13:04

## 2022-07-06 RX ADMIN — LIDOCAINE HYDROCHLORIDE 60 MG: 20 INJECTION, SOLUTION INFILTRATION; PERINEURAL at 13:03

## 2022-07-06 RX ADMIN — MIDAZOLAM 2 MG: 1 INJECTION INTRAMUSCULAR; INTRAVENOUS at 13:03

## 2022-07-06 RX ADMIN — PROPOFOL 50 MG: 10 INJECTION, EMULSION INTRAVENOUS at 13:04

## 2022-07-06 RX ADMIN — MIDAZOLAM HYDROCHLORIDE 1 MG: 1 INJECTION, SOLUTION INTRAMUSCULAR; INTRAVENOUS at 12:14

## 2022-07-06 RX ADMIN — HYOSCYAMINE SULFATE 250 MCG: 0.12 TABLET, ORALLY DISINTEGRATING ORAL at 13:55

## 2022-07-06 RX ADMIN — PROPOFOL 50 MG: 10 INJECTION, EMULSION INTRAVENOUS at 13:09

## 2022-07-06 RX ADMIN — PROPOFOL 50 MG: 10 INJECTION, EMULSION INTRAVENOUS at 13:14

## 2022-07-06 RX ADMIN — MIDAZOLAM HYDROCHLORIDE 1 MG: 1 INJECTION, SOLUTION INTRAMUSCULAR; INTRAVENOUS at 12:04

## 2022-07-06 RX ADMIN — PROPOFOL 200 MCG/KG/MIN: 10 INJECTION, EMULSION INTRAVENOUS at 13:04

## 2022-07-06 NOTE — ANESTHESIA POSTPROCEDURE EVALUATION
Patient: Rani Martins    Procedure Summary     Date: 07/06/22 Room / Location: [facility] OR  /  [facility] OR    Anesthesia Start: 1303 Anesthesia Stop: 1333    Procedures:       ESOPHAGOGASTRODUODENOSCOPY WITH BIOPSY (N/A Esophagus)      COLONOSCOPY FOR SCREENING (N/A ) Diagnosis:       Nausea and vomiting, unspecified vomiting type      Screening for colorectal cancer      (Nausea and vomiting, unspecified vomiting type [R11.2])      (Screening for colorectal cancer [Z12.11, Z12.12])    Surgeons: Staci Samaniego MD Provider: Brent Khalil DO    Anesthesia Type: general ASA Status: 2          Anesthesia Type: general    Vitals  Vitals Value Taken Time   /98 07/06/22 1404   Temp 97.1 °F (36.2 °C) 07/06/22 1334   Pulse 71 07/06/22 1404   Resp 18 07/06/22 1404   SpO2 97 % 07/06/22 1404           Post Anesthesia Care and Evaluation    Patient location during evaluation: PHASE II  Patient participation: complete - patient participated  Level of consciousness: awake and alert  Pain score: 1  Pain management: adequate    Airway patency: patent  Anesthetic complications: No anesthetic complications  PONV Status: controlled  Cardiovascular status: acceptable  Respiratory status: acceptable  Hydration status: acceptable
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
## 2022-07-06 NOTE — ANESTHESIA PREPROCEDURE EVALUATION
Anesthesia Evaluation     Patient summary reviewed and Nursing notes reviewed   no history of anesthetic complications:               Airway   Mallampati: I  TM distance: >3 FB  Neck ROM: full  No difficulty expected  Dental - normal exam   (+) poor dentition    Pulmonary - normal exam   (+) asthma,  Cardiovascular - normal exam  Exercise tolerance: good (4-7 METS)    NYHA Classification: II    (+) hypertension, past MI ,       Neuro/Psych  (+) psychiatric history Anxiety,    GI/Hepatic/Renal/Endo    (+)  GERD,      Musculoskeletal (-) negative ROS    Abdominal  - normal exam    Bowel sounds: normal.   Substance History - negative use     OB/GYN negative ob/gyn ROS         Other - negative ROS                       Anesthesia Plan    ASA 2     general     intravenous induction     Anesthetic plan, risks, benefits, and alternatives have been provided, discussed and informed consent has been obtained with: patient.        CODE STATUS:

## 2022-07-06 NOTE — H&P
"    [facility]    Patient Identification:  Name:  Rani Martins  Age:  51 y.o.  Sex:  female  :  1970  MRN:  [id number]   Visit Number:  [id number]  Primary Care Physician:  Eldon Christian PA       Chief complaint: Nausea and vomiting, screening colonoscopy    History of presenting illness:  51 y.o. female presents for EGDsecondary to chronic nausea and intermittent vomiting as well as screening colonoscopy.  She has had issues for about 2 years.  There is associated anxiety.  She denies heartburn or reflux.  She has not had her gallbladder removed.    There is associated epigastric and lower abdominal pain.  She states she feels \"like my guts are on fire\".  She has had an EGD in the past but cannot recall the results. She has not worked in over a year because a year due to nausea and vomiting.  She may have lost about 10 lbs but she is not sure.   Smells and \"seeing something gross\" brings on the nausea.  She seems to tolerate carnation instant breakfast.  She vomited blood once but her nose had been bleeding.  She feels that her anxiety makes her nausea worse. She is able to eat most of the time but there are some days she can't eat.  She is on Phenergan and Zofran. Sometimes this helpful and sometimes it does not. She denies constipation.  She has several  bowel movements a day. Her stool is a [place] score 4.  She has not had a screening colonoscopy. No family history of colon cancer.  ---------------------------------------------------------------------------------------------------------------------   Review of Systems   Constitutional: Negative for activity change, appetite change, chills, fatigue, fever and unexpected weight change.   HENT: Negative for mouth sores and trouble swallowing.    Eyes: Negative for photophobia, pain and redness.   Respiratory: Negative for cough and choking.    Cardiovascular: Negative for chest pain and leg swelling. "   Gastrointestinal: Positive for abdominal pain, nausea and vomiting. Negative for abdominal distention, anal bleeding, constipation, diarrhea and rectal pain.   Endocrine: Negative for cold intolerance, heat intolerance and polyphagia.   Genitourinary: Negative for difficulty urinating and dysuria.   Musculoskeletal: Positive for back pain and neck pain. Negative for arthralgias, joint swelling and myalgias.   Skin: Positive for pallor. Negative for rash and wound.   Allergic/Immunologic: Negative for environmental allergies and food allergies.   Neurological: Negative for dizziness and light-headedness.   Hematological: Negative for adenopathy. Bruises/bleeds easily.   Psychiatric/Behavioral: Positive for sleep disturbance. The patient is nervous/anxious.       ---------------------------------------------------------------------------------------------------------------------   Past Medical History:   Diagnosis Date   • Anxiety    • Asthma     childhood asthma   • Bipolar disorder (HCC)    • Depression    • Fibromyalgia    • GERD (gastroesophageal reflux disease)    • Hypertension      Past Surgical History:   Procedure Laterality Date   • BREAST BIOPSY Left 1997    benign   • CARDIAC CATHETERIZATION N/A 07/03/2020    Procedure: Left Heart Cath;  Surgeon: Ludmila Murray MD;  Location: [facility] INVASIVE LOCATION;  Service: Cardiovascular;  Laterality: N/A;   • HYSTERECTOMY      30   • UPPER GASTROINTESTINAL ENDOSCOPY       Family History   Problem Relation Age of Onset   • Heart disease Mother    • Anxiety disorder Mother    • Depression Mother    • Throat cancer Father      Social History     Socioeconomic History   • Marital status:    Tobacco Use   • Smoking status: Never Smoker   • Smokeless tobacco: Never Used   Vaping Use   • Vaping Use: Never used   Substance and Sexual Activity   • Alcohol use: No   • Drug use: No   • Sexual activity: Defer      ---------------------------------------------------------------------------------------------------------------------   Allergies:  Lyrica [pregabalin], Cymbalta [duloxetine hcl], Erythromycin, Lithium, Toradol [ketorolac tromethamine], Tramadol, and Penicillins  ---------------------------------------------------------------------------------------------------------------------   Prior to Admission Medications     Prescriptions Last Dose Informant Patient Reported? Taking?    clonazePAM (KlonoPIN) 0.5 MG tablet 7/5/2022 Pharmacy Yes Yes    Take 0.25 mg by mouth 2 (Two) Times a Day As Needed.    promethazine (PHENERGAN) 25 MG tablet Past Month Pharmacy Yes Yes    Take 25 mg by mouth Every 6 (Six) Hours As Needed for Nausea or Vomiting.    tiZANidine (ZANAFLEX) 4 MG tablet 7/5/2022 Pharmacy Yes Yes    Take 4 mg by mouth Every 8 (Eight) Hours As Needed for Muscle Spasms.    hydrALAZINE (APRESOLINE) 25 MG tablet Past Month Pharmacy Yes Yes    Take 12.5 mg by mouth Every 8 (Eight) Hours As Needed. Prior to [facility] Admission, Patient was on:  Patient has this if she needs it but has not had to take it in around a year. Last picked up from pharmacy 05/04/22    magnesium citrate solution 7/5/2022  No Yes    Take 296 mL by mouth Take As Directed. Follow bowel prep instructions given at office    OLANZapine zydis (ZyPREXA Zydis) 5 MG disintegrating tablet More than a month  No No    Place 1 tablet on the tongue Every Night for 7 days, THEN 2 tablets Every Night for 23 days.        Hospital Scheduled Meds:  lactated ringers, 125 mL/hr, Intravenous, Once  sodium chloride, 10 mL, Intravenous, Q12H         ---------------------------------------------------------------------------------------------------------------------   Vital Signs:  Temp:  [97.9 °F (36.6 °C)] 97.9 °F (36.6 °C)  Heart Rate:  [70] 70  Resp:  [18] 18  BP: (189)/(80) 189/80      07/06/22  1158   Weight: 68 kg (150 lb)     Body mass index is 28.34  kg/m².  ---------------------------------------------------------------------------------------------------------------------   Physical Exam:  Constitutional:  Well-developed and well-nourished.  No respiratory distress.      HENT:  Head: Normocephalic and atraumatic.  Mouth:  Moist mucous membranes.    Eyes:  Conjunctivae and EOM are normal.  Pupils are equal, round, and reactive to light.  No scleral icterus.  Neck:  Neck supple.  No JVD present.    Cardiovascular:  Normal rate, regular rhythm and normal heart sounds with no murmur.  Pulmonary/Chest:  No respiratory distress, no wheezes, no crackles, with normal breath sounds and good air movement.  Abdominal:  Soft.  Bowel sounds are normal.  No distension and no tenderness.   Musculoskeletal:  No edema, no tenderness, and no deformity.  No red or swollen joints anywhere.    Neurological:  Alert and oriented to person, place, and time.  No cranial nerve deficit.  No tongue deviation.  No facial droop.  No slurred speech.   Skin:  Skin is warm and dry.  No rash noted.  No pallor.   Psychiatric:  Normal mood and affect.  Behavior is normal.  Judgment and thought content normal.   Peripheral vascular:  No edema and strong pulses on all 4 extremities.  Genitourinary:  ---------------------------------------------------------------------------------------------------------------------                  Invalid input(s): PROTCrCl cannot be calculated (Patient's most recent lab result is older than the maximum 30 days allowed.).  No results found for: AMMONIA          Lab Results   Component Value Date    HGBA1C 5.00 01/29/2018     Lab Results   Component Value Date    TSH 3.010 05/09/2022    FREET4 0.95 01/19/2016     No results found for: PREGTESTUR, PREGSERUM, HCG, HCGQUANT  Pain Management Panel     Pain Management Panel Latest Ref Rng & Units 5/9/2022 8/25/2021    AMPHETAMINES SCREEN, URINE Negative Negative Negative    BARBITURATES SCREEN Negative Negative  Negative    BENZODIAZEPINE SCREEN, URINE Negative Positive(A) Negative    BUPRENORPHINEUR Negative Negative Negative    COCAINE SCREEN, URINE Negative Negative Negative    METHADONE SCREEN, URINE Negative Negative Negative    METHAMPHETAMINEUR Negative Negative -        No results found for: BLOODCX  No results found for: URINECX  No results found for: WOUNDCX  No results found for: STOOLCX      ---------------------------------------------------------------------------------------------------------------------  Imaging Results (Last 7 Days)     ** No results found for the last 168 hours. **          I have personally reviewed the radiology images and read the final radiology report.  ---------------------------------------------------------------------------------------------------------------------  Assessment and Plan:    Proceed with EGD secondary to chronic nausea vomiting.  She will also undergo screening colonoscopy today.  Staci Samaniego MD  07/06/22  12:20 EDT

## 2022-07-07 LAB — REF LAB TEST METHOD: NORMAL

## 2022-07-08 NOTE — PROGRESS NOTES
At the time of your recent upper endoscopy, biopsies were taken of the esophagus revealing reflux esophagitis.  Please continue Protonix 40 mg once daily as prescribed.  Biopsies of the stomach were negative for H. pylori gastritis.  Biopsies of the small bowel were negative for celiac disease.  Your colonoscopy was normal.  You will need repeat colonoscopy for screening purposes in 10 years.  Please keep your follow-up appointment.

## 2022-07-20 ENCOUNTER — TELEPHONE (OUTPATIENT)
Dept: FAMILY MEDICINE CLINIC | Facility: CLINIC | Age: 52
End: 2022-07-20

## 2022-07-21 NOTE — TELEPHONE ENCOUNTER
PA for Zyprexa was approved. Pt was contacted and informed of approval. Pt verbalized understanding. I informed the pt to contact us back if she has an further problems.

## 2022-08-19 ENCOUNTER — APPOINTMENT (OUTPATIENT)
Dept: GENERAL RADIOLOGY | Facility: HOSPITAL | Age: 52
End: 2022-08-19

## 2022-08-19 ENCOUNTER — APPOINTMENT (OUTPATIENT)
Dept: CT IMAGING | Facility: HOSPITAL | Age: 52
End: 2022-08-19

## 2022-08-19 ENCOUNTER — HOSPITAL ENCOUNTER (INPATIENT)
Facility: HOSPITAL | Age: 52
LOS: 2 days | Discharge: HOME | End: 2022-08-21
Attending: EMERGENCY MEDICINE | Admitting: INTERNAL MEDICINE

## 2022-08-19 DIAGNOSIS — R10.9 ABDOMINAL PAIN, UNSPECIFIED ABDOMINAL LOCATION: Primary | ICD-10-CM

## 2022-08-19 DIAGNOSIS — I10 UNCONTROLLED HYPERTENSION: ICD-10-CM

## 2022-08-19 DIAGNOSIS — R11.2 NAUSEA AND VOMITING, UNSPECIFIED VOMITING TYPE: ICD-10-CM

## 2022-08-19 DIAGNOSIS — E87.20 LACTIC ACIDOSIS: ICD-10-CM

## 2022-08-19 LAB
ALBUMIN SERPL-MCNC: 4.96 G/DL (ref 3.5–5.2)
ALBUMIN/GLOB SERPL: 1.6 G/DL
ALP SERPL-CCNC: 96 U/L (ref 39–117)
ALT SERPL W P-5'-P-CCNC: 14 U/L (ref 1–33)
AMMONIA BLD-SCNC: 37 UMOL/L (ref 11–51)
AMPHET+METHAMPHET UR QL: NEGATIVE
AMPHETAMINES UR QL: NEGATIVE
ANION GAP SERPL CALCULATED.3IONS-SCNC: 17.2 MMOL/L (ref 5–15)
APTT PPP: 27.6 SECONDS (ref 26.5–34.5)
AST SERPL-CCNC: 28 U/L (ref 1–32)
BACTERIA UR QL AUTO: ABNORMAL /HPF
BARBITURATES UR QL SCN: NEGATIVE
BASOPHILS # BLD AUTO: 0.05 10*3/MM3 (ref 0–0.2)
BASOPHILS NFR BLD AUTO: 0.3 % (ref 0–1.5)
BENZODIAZ UR QL SCN: POSITIVE
BILIRUB SERPL-MCNC: 0.8 MG/DL (ref 0–1.2)
BILIRUB UR QL STRIP: NEGATIVE
BUN SERPL-MCNC: 12 MG/DL (ref 6–20)
BUN/CREAT SERPL: 15.4 (ref 7–25)
BUPRENORPHINE SERPL-MCNC: NEGATIVE NG/ML
CALCIUM SPEC-SCNC: 10.2 MG/DL (ref 8.6–10.5)
CANNABINOIDS SERPL QL: POSITIVE
CHLORIDE SERPL-SCNC: 100 MMOL/L (ref 98–107)
CLARITY UR: CLEAR
CO2 SERPL-SCNC: 18.8 MMOL/L (ref 22–29)
COCAINE UR QL: NEGATIVE
COLOR UR: YELLOW
CREAT SERPL-MCNC: 0.78 MG/DL (ref 0.57–1)
CRP SERPL-MCNC: <0.3 MG/DL (ref 0–0.5)
D-LACTATE SERPL-SCNC: 1.5 MMOL/L (ref 0.5–2)
D-LACTATE SERPL-SCNC: 2.1 MMOL/L (ref 0.5–2)
D-LACTATE SERPL-SCNC: 4.7 MMOL/L (ref 0.5–2)
DEPRECATED RDW RBC AUTO: 42.5 FL (ref 37–54)
EGFRCR SERPLBLD CKD-EPI 2021: 93.2 ML/MIN/1.73
EOSINOPHIL # BLD AUTO: 0.02 10*3/MM3 (ref 0–0.4)
EOSINOPHIL NFR BLD AUTO: 0.1 % (ref 0.3–6.2)
ERYTHROCYTE [DISTWIDTH] IN BLOOD BY AUTOMATED COUNT: 12.9 % (ref 12.3–15.4)
FLUAV RNA RESP QL NAA+PROBE: NOT DETECTED
FLUBV RNA RESP QL NAA+PROBE: NOT DETECTED
GLOBULIN UR ELPH-MCNC: 3.1 GM/DL
GLUCOSE SERPL-MCNC: 176 MG/DL (ref 65–99)
GLUCOSE UR STRIP-MCNC: ABNORMAL MG/DL
HCT VFR BLD AUTO: 41.1 % (ref 34–46.6)
HGB BLD-MCNC: 13.6 G/DL (ref 12–15.9)
HGB UR QL STRIP.AUTO: ABNORMAL
HOLD SPECIMEN: NORMAL
HOLD SPECIMEN: NORMAL
HYALINE CASTS UR QL AUTO: ABNORMAL /LPF
IMM GRANULOCYTES # BLD AUTO: 0.08 10*3/MM3 (ref 0–0.05)
IMM GRANULOCYTES NFR BLD AUTO: 0.5 % (ref 0–0.5)
INR PPP: 1.04 (ref 0.9–1.1)
KETONES UR QL STRIP: ABNORMAL
LEUKOCYTE ESTERASE UR QL STRIP.AUTO: NEGATIVE
LIPASE SERPL-CCNC: 10 U/L (ref 13–60)
LYMPHOCYTES # BLD AUTO: 1.24 10*3/MM3 (ref 0.7–3.1)
LYMPHOCYTES NFR BLD AUTO: 7.7 % (ref 19.6–45.3)
MAGNESIUM SERPL-MCNC: 2 MG/DL (ref 1.6–2.6)
MCH RBC QN AUTO: 29.6 PG (ref 26.6–33)
MCHC RBC AUTO-ENTMCNC: 33.1 G/DL (ref 31.5–35.7)
MCV RBC AUTO: 89.5 FL (ref 79–97)
METHADONE UR QL SCN: NEGATIVE
MONOCYTES # BLD AUTO: 0.56 10*3/MM3 (ref 0.1–0.9)
MONOCYTES NFR BLD AUTO: 3.5 % (ref 5–12)
NEUTROPHILS NFR BLD AUTO: 14.19 10*3/MM3 (ref 1.7–7)
NEUTROPHILS NFR BLD AUTO: 87.9 % (ref 42.7–76)
NITRITE UR QL STRIP: NEGATIVE
NRBC BLD AUTO-RTO: 0 /100 WBC (ref 0–0.2)
OPIATES UR QL: NEGATIVE
OXYCODONE UR QL SCN: POSITIVE
PCP UR QL SCN: NEGATIVE
PH UR STRIP.AUTO: 6.5 [PH] (ref 5–8)
PLATELET # BLD AUTO: 409 10*3/MM3 (ref 140–450)
PMV BLD AUTO: 10.5 FL (ref 6–12)
POTASSIUM SERPL-SCNC: 4 MMOL/L (ref 3.5–5.2)
PROCALCITONIN SERPL-MCNC: 0.05 NG/ML (ref 0–0.25)
PROPOXYPH UR QL: NEGATIVE
PROT SERPL-MCNC: 8.1 G/DL (ref 6–8.5)
PROT UR QL STRIP: ABNORMAL
PROTHROMBIN TIME: 13.8 SECONDS (ref 12.1–14.7)
QT INTERVAL: 380 MS
QT INTERVAL: 452 MS
QTC INTERVAL: 509 MS
QTC INTERVAL: 546 MS
RBC # BLD AUTO: 4.59 10*6/MM3 (ref 3.77–5.28)
RBC # UR STRIP: ABNORMAL /HPF
REF LAB TEST METHOD: ABNORMAL
SARS-COV-2 RNA RESP QL NAA+PROBE: NOT DETECTED
SODIUM SERPL-SCNC: 136 MMOL/L (ref 136–145)
SP GR UR STRIP: 1.02 (ref 1–1.03)
SQUAMOUS #/AREA URNS HPF: ABNORMAL /HPF
TRICYCLICS UR QL SCN: NEGATIVE
TROPONIN T SERPL-MCNC: <0.01 NG/ML (ref 0–0.03)
TSH SERPL DL<=0.05 MIU/L-ACNC: 0.97 UIU/ML (ref 0.27–4.2)
UROBILINOGEN UR QL STRIP: ABNORMAL
WBC # UR STRIP: ABNORMAL /HPF
WBC NRBC COR # BLD: 16.14 10*3/MM3 (ref 3.4–10.8)
WHOLE BLOOD HOLD COAG: NORMAL
WHOLE BLOOD HOLD SPECIMEN: NORMAL

## 2022-08-19 PROCEDURE — 25010000002 MORPHINE PER 10 MG: Performed by: EMERGENCY MEDICINE

## 2022-08-19 PROCEDURE — 36415 COLL VENOUS BLD VENIPUNCTURE: CPT

## 2022-08-19 PROCEDURE — 84145 PROCALCITONIN (PCT): CPT | Performed by: EMERGENCY MEDICINE

## 2022-08-19 PROCEDURE — 93010 ELECTROCARDIOGRAM REPORT: CPT | Performed by: INTERNAL MEDICINE

## 2022-08-19 PROCEDURE — 93005 ELECTROCARDIOGRAM TRACING: CPT | Performed by: EMERGENCY MEDICINE

## 2022-08-19 PROCEDURE — 84484 ASSAY OF TROPONIN QUANT: CPT | Performed by: EMERGENCY MEDICINE

## 2022-08-19 PROCEDURE — 83690 ASSAY OF LIPASE: CPT | Performed by: EMERGENCY MEDICINE

## 2022-08-19 PROCEDURE — 25010000002 HYDRALAZINE PER 20 MG: Performed by: HOSPITALIST

## 2022-08-19 PROCEDURE — 80053 COMPREHEN METABOLIC PANEL: CPT | Performed by: EMERGENCY MEDICINE

## 2022-08-19 PROCEDURE — 63710000001 PROMETHAZINE PER 25 MG: Performed by: INTERNAL MEDICINE

## 2022-08-19 PROCEDURE — 25010000002 PROCHLORPERAZINE 10 MG/2ML SOLUTION: Performed by: EMERGENCY MEDICINE

## 2022-08-19 PROCEDURE — 87636 SARSCOV2 & INF A&B AMP PRB: CPT | Performed by: EMERGENCY MEDICINE

## 2022-08-19 PROCEDURE — 87040 BLOOD CULTURE FOR BACTERIA: CPT | Performed by: EMERGENCY MEDICINE

## 2022-08-19 PROCEDURE — 99223 1ST HOSP IP/OBS HIGH 75: CPT | Performed by: HOSPITALIST

## 2022-08-19 PROCEDURE — 83735 ASSAY OF MAGNESIUM: CPT | Performed by: EMERGENCY MEDICINE

## 2022-08-19 PROCEDURE — 81001 URINALYSIS AUTO W/SCOPE: CPT | Performed by: EMERGENCY MEDICINE

## 2022-08-19 PROCEDURE — 74177 CT ABD & PELVIS W/CONTRAST: CPT

## 2022-08-19 PROCEDURE — 25010000002 LORAZEPAM PER 2 MG: Performed by: HOSPITALIST

## 2022-08-19 PROCEDURE — 99221 1ST HOSP IP/OBS SF/LOW 40: CPT | Performed by: SURGERY

## 2022-08-19 PROCEDURE — 82140 ASSAY OF AMMONIA: CPT | Performed by: HOSPITALIST

## 2022-08-19 PROCEDURE — 25010000002 ENOXAPARIN PER 10 MG: Performed by: INTERNAL MEDICINE

## 2022-08-19 PROCEDURE — 25010000002 IOPAMIDOL 61 % SOLUTION: Performed by: EMERGENCY MEDICINE

## 2022-08-19 PROCEDURE — 80306 DRUG TEST PRSMV INSTRMNT: CPT | Performed by: EMERGENCY MEDICINE

## 2022-08-19 PROCEDURE — 85025 COMPLETE CBC W/AUTO DIFF WBC: CPT | Performed by: EMERGENCY MEDICINE

## 2022-08-19 PROCEDURE — 25010000002 HYDRALAZINE PER 20 MG: Performed by: EMERGENCY MEDICINE

## 2022-08-19 PROCEDURE — 83605 ASSAY OF LACTIC ACID: CPT | Performed by: EMERGENCY MEDICINE

## 2022-08-19 PROCEDURE — 71045 X-RAY EXAM CHEST 1 VIEW: CPT

## 2022-08-19 PROCEDURE — 85730 THROMBOPLASTIN TIME PARTIAL: CPT | Performed by: EMERGENCY MEDICINE

## 2022-08-19 PROCEDURE — 85610 PROTHROMBIN TIME: CPT | Performed by: EMERGENCY MEDICINE

## 2022-08-19 PROCEDURE — 25010000002 CEFEPIME PER 500 MG: Performed by: EMERGENCY MEDICINE

## 2022-08-19 PROCEDURE — 84443 ASSAY THYROID STIM HORMONE: CPT | Performed by: EMERGENCY MEDICINE

## 2022-08-19 PROCEDURE — 86140 C-REACTIVE PROTEIN: CPT | Performed by: EMERGENCY MEDICINE

## 2022-08-19 PROCEDURE — 99284 EMERGENCY DEPT VISIT MOD MDM: CPT

## 2022-08-19 RX ORDER — PANTOPRAZOLE SODIUM 40 MG/1
40 TABLET, DELAYED RELEASE ORAL DAILY
Status: CANCELLED | OUTPATIENT
Start: 2022-08-19

## 2022-08-19 RX ORDER — NITROGLYCERIN 0.4 MG/1
0.4 TABLET SUBLINGUAL
Status: DISCONTINUED | OUTPATIENT
Start: 2022-08-19 | End: 2022-08-21 | Stop reason: HOSPADM

## 2022-08-19 RX ORDER — PROCHLORPERAZINE EDISYLATE 5 MG/ML
5 INJECTION INTRAMUSCULAR; INTRAVENOUS ONCE
Status: COMPLETED | OUTPATIENT
Start: 2022-08-19 | End: 2022-08-19

## 2022-08-19 RX ORDER — ACETAMINOPHEN 325 MG/1
650 TABLET ORAL EVERY 6 HOURS PRN
Status: DISCONTINUED | OUTPATIENT
Start: 2022-08-19 | End: 2022-08-21 | Stop reason: HOSPADM

## 2022-08-19 RX ORDER — TRAZODONE HYDROCHLORIDE 50 MG/1
150 TABLET ORAL NIGHTLY
Status: DISCONTINUED | OUTPATIENT
Start: 2022-08-19 | End: 2022-08-21 | Stop reason: HOSPADM

## 2022-08-19 RX ORDER — TRAZODONE HYDROCHLORIDE 150 MG/1
150 TABLET ORAL NIGHTLY
COMMUNITY
End: 2022-10-18

## 2022-08-19 RX ORDER — LORAZEPAM 2 MG/ML
0.25 INJECTION INTRAMUSCULAR EVERY 4 HOURS PRN
Status: DISCONTINUED | OUTPATIENT
Start: 2022-08-19 | End: 2022-08-20

## 2022-08-19 RX ORDER — HYDRALAZINE HYDROCHLORIDE 20 MG/ML
20 INJECTION INTRAMUSCULAR; INTRAVENOUS ONCE
Status: COMPLETED | OUTPATIENT
Start: 2022-08-19 | End: 2022-08-19

## 2022-08-19 RX ORDER — SODIUM CHLORIDE 0.9 % (FLUSH) 0.9 %
10 SYRINGE (ML) INJECTION AS NEEDED
Status: DISCONTINUED | OUTPATIENT
Start: 2022-08-19 | End: 2022-08-21 | Stop reason: HOSPADM

## 2022-08-19 RX ORDER — SUCRALFATE 1 G/1
1 TABLET ORAL
Status: DISCONTINUED | OUTPATIENT
Start: 2022-08-19 | End: 2022-08-21 | Stop reason: HOSPADM

## 2022-08-19 RX ORDER — PROMETHAZINE HYDROCHLORIDE 25 MG/1
25 TABLET ORAL EVERY 6 HOURS PRN
Status: DISCONTINUED | OUTPATIENT
Start: 2022-08-19 | End: 2022-08-21 | Stop reason: HOSPADM

## 2022-08-19 RX ORDER — PANTOPRAZOLE SODIUM 40 MG/10ML
40 INJECTION, POWDER, LYOPHILIZED, FOR SOLUTION INTRAVENOUS
Status: DISCONTINUED | OUTPATIENT
Start: 2022-08-19 | End: 2022-08-19

## 2022-08-19 RX ORDER — SODIUM CHLORIDE 9 MG/ML
125 INJECTION, SOLUTION INTRAVENOUS CONTINUOUS
Status: DISCONTINUED | OUTPATIENT
Start: 2022-08-19 | End: 2022-08-21 | Stop reason: HOSPADM

## 2022-08-19 RX ORDER — SUCRALFATE 1 G/1
1 TABLET ORAL 3 TIMES DAILY
COMMUNITY

## 2022-08-19 RX ORDER — PROMETHAZINE HYDROCHLORIDE 6.25 MG/5ML
6.25 SYRUP ORAL EVERY 6 HOURS PRN
Status: DISCONTINUED | OUTPATIENT
Start: 2022-08-19 | End: 2022-08-19

## 2022-08-19 RX ORDER — PANTOPRAZOLE SODIUM 40 MG/10ML
40 INJECTION, POWDER, LYOPHILIZED, FOR SOLUTION INTRAVENOUS
Status: DISCONTINUED | OUTPATIENT
Start: 2022-08-19 | End: 2022-08-21 | Stop reason: HOSPADM

## 2022-08-19 RX ORDER — PANTOPRAZOLE SODIUM 40 MG/10ML
40 INJECTION, POWDER, LYOPHILIZED, FOR SOLUTION INTRAVENOUS ONCE
Status: COMPLETED | OUTPATIENT
Start: 2022-08-19 | End: 2022-08-19

## 2022-08-19 RX ORDER — HYDRALAZINE HYDROCHLORIDE 20 MG/ML
10 INJECTION INTRAMUSCULAR; INTRAVENOUS EVERY 6 HOURS PRN
Status: DISCONTINUED | OUTPATIENT
Start: 2022-08-19 | End: 2022-08-21 | Stop reason: HOSPADM

## 2022-08-19 RX ORDER — TIZANIDINE 4 MG/1
4 TABLET ORAL 3 TIMES DAILY PRN
Status: DISCONTINUED | OUTPATIENT
Start: 2022-08-19 | End: 2022-08-21 | Stop reason: HOSPADM

## 2022-08-19 RX ORDER — ENOXAPARIN SODIUM 100 MG/ML
40 INJECTION SUBCUTANEOUS EVERY 24 HOURS
Status: DISCONTINUED | OUTPATIENT
Start: 2022-08-19 | End: 2022-08-21 | Stop reason: HOSPADM

## 2022-08-19 RX ORDER — AMLODIPINE BESYLATE 5 MG/1
5 TABLET ORAL DAILY
COMMUNITY
End: 2022-08-21 | Stop reason: HOSPADM

## 2022-08-19 RX ORDER — SODIUM CHLORIDE 0.9 % (FLUSH) 0.9 %
10 SYRINGE (ML) INJECTION EVERY 12 HOURS SCHEDULED
Status: DISCONTINUED | OUTPATIENT
Start: 2022-08-19 | End: 2022-08-21 | Stop reason: HOSPADM

## 2022-08-19 RX ORDER — PROMETHAZINE HYDROCHLORIDE 12.5 MG/1
6.25 SUPPOSITORY RECTAL EVERY 6 HOURS PRN
Status: DISCONTINUED | OUTPATIENT
Start: 2022-08-19 | End: 2022-08-21 | Stop reason: HOSPADM

## 2022-08-19 RX ORDER — AMLODIPINE BESYLATE 5 MG/1
5 TABLET ORAL DAILY
Status: DISCONTINUED | OUTPATIENT
Start: 2022-08-19 | End: 2022-08-21

## 2022-08-19 RX ADMIN — PROMETHAZINE HYDROCHLORIDE 25 MG: 25 TABLET ORAL at 16:50

## 2022-08-19 RX ADMIN — SODIUM CHLORIDE 5 MG/HR: 9 INJECTION, SOLUTION INTRAVENOUS at 13:13

## 2022-08-19 RX ADMIN — SODIUM CHLORIDE 5 MG/HR: 9 INJECTION, SOLUTION INTRAVENOUS at 20:22

## 2022-08-19 RX ADMIN — MORPHINE SULFATE 4 MG: 4 INJECTION, SOLUTION INTRAMUSCULAR; INTRAVENOUS at 03:04

## 2022-08-19 RX ADMIN — SODIUM CHLORIDE 1000 ML: 9 INJECTION, SOLUTION INTRAVENOUS at 04:43

## 2022-08-19 RX ADMIN — TRAZODONE HYDROCHLORIDE 150 MG: 50 TABLET ORAL at 21:01

## 2022-08-19 RX ADMIN — SODIUM CHLORIDE 1000 ML: 9 INJECTION, SOLUTION INTRAVENOUS at 03:14

## 2022-08-19 RX ADMIN — LORAZEPAM 0.25 MG: 2 INJECTION INTRAMUSCULAR; INTRAVENOUS at 21:06

## 2022-08-19 RX ADMIN — CEFEPIME HYDROCHLORIDE 2 G: 2 INJECTION, POWDER, FOR SOLUTION INTRAVENOUS at 05:33

## 2022-08-19 RX ADMIN — PROCHLORPERAZINE EDISYLATE 5 MG: 5 INJECTION INTRAMUSCULAR; INTRAVENOUS at 03:06

## 2022-08-19 RX ADMIN — PANTOPRAZOLE SODIUM 40 MG: 40 INJECTION, POWDER, FOR SOLUTION INTRAVENOUS at 18:08

## 2022-08-19 RX ADMIN — Medication 10 ML: at 10:00

## 2022-08-19 RX ADMIN — PROMETHAZINE HYDROCHLORIDE 25 MG: 25 TABLET ORAL at 22:53

## 2022-08-19 RX ADMIN — ACETAMINOPHEN 650 MG: 325 TABLET, FILM COATED ORAL at 18:08

## 2022-08-19 RX ADMIN — PANTOPRAZOLE SODIUM 40 MG: 40 INJECTION, POWDER, FOR SOLUTION INTRAVENOUS at 03:10

## 2022-08-19 RX ADMIN — ENOXAPARIN SODIUM 40 MG: 40 INJECTION SUBCUTANEOUS at 18:08

## 2022-08-19 RX ADMIN — AMLODIPINE BESYLATE 5 MG: 5 TABLET ORAL at 16:49

## 2022-08-19 RX ADMIN — HYDRALAZINE HYDROCHLORIDE 10 MG: 20 INJECTION INTRAMUSCULAR; INTRAVENOUS at 10:00

## 2022-08-19 RX ADMIN — SUCRALFATE 1 G: 1 TABLET ORAL at 18:08

## 2022-08-19 RX ADMIN — SODIUM CHLORIDE 125 ML/HR: 9 INJECTION, SOLUTION INTRAVENOUS at 03:14

## 2022-08-19 RX ADMIN — IOPAMIDOL 80 ML: 612 INJECTION, SOLUTION INTRAVENOUS at 03:56

## 2022-08-19 RX ADMIN — SODIUM CHLORIDE 125 ML/HR: 9 INJECTION, SOLUTION INTRAVENOUS at 17:28

## 2022-08-19 RX ADMIN — HYDRALAZINE HYDROCHLORIDE 20 MG: 20 INJECTION INTRAMUSCULAR; INTRAVENOUS at 05:30

## 2022-08-19 RX ADMIN — LORAZEPAM 0.25 MG: 2 INJECTION INTRAMUSCULAR; INTRAVENOUS at 11:18

## 2022-08-20 LAB
ALBUMIN SERPL-MCNC: 4.54 G/DL (ref 3.5–5.2)
ALBUMIN/GLOB SERPL: 1.5 G/DL
ALP SERPL-CCNC: 87 U/L (ref 39–117)
ALT SERPL W P-5'-P-CCNC: 12 U/L (ref 1–33)
ANION GAP SERPL CALCULATED.3IONS-SCNC: 12.6 MMOL/L (ref 5–15)
AST SERPL-CCNC: 22 U/L (ref 1–32)
BASOPHILS # BLD AUTO: 0.03 10*3/MM3 (ref 0–0.2)
BASOPHILS NFR BLD AUTO: 0.2 % (ref 0–1.5)
BILIRUB SERPL-MCNC: 0.8 MG/DL (ref 0–1.2)
BUN SERPL-MCNC: 6 MG/DL (ref 6–20)
BUN/CREAT SERPL: 11.5 (ref 7–25)
CALCIUM SPEC-SCNC: 9.7 MG/DL (ref 8.6–10.5)
CHLORIDE SERPL-SCNC: 107 MMOL/L (ref 98–107)
CO2 SERPL-SCNC: 21.4 MMOL/L (ref 22–29)
CREAT SERPL-MCNC: 0.52 MG/DL (ref 0.57–1)
D-LACTATE SERPL-SCNC: 1.1 MMOL/L (ref 0.5–2)
DEPRECATED RDW RBC AUTO: 43.7 FL (ref 37–54)
EGFRCR SERPLBLD CKD-EPI 2021: 114.1 ML/MIN/1.73
EOSINOPHIL # BLD AUTO: 0.01 10*3/MM3 (ref 0–0.4)
EOSINOPHIL NFR BLD AUTO: 0.1 % (ref 0.3–6.2)
ERYTHROCYTE [DISTWIDTH] IN BLOOD BY AUTOMATED COUNT: 13.4 % (ref 12.3–15.4)
GLOBULIN UR ELPH-MCNC: 3.1 GM/DL
GLUCOSE SERPL-MCNC: 114 MG/DL (ref 65–99)
HCT VFR BLD AUTO: 38.6 % (ref 34–46.6)
HGB BLD-MCNC: 12.6 G/DL (ref 12–15.9)
IMM GRANULOCYTES # BLD AUTO: 0.11 10*3/MM3 (ref 0–0.05)
IMM GRANULOCYTES NFR BLD AUTO: 0.7 % (ref 0–0.5)
LYMPHOCYTES # BLD AUTO: 2.4 10*3/MM3 (ref 0.7–3.1)
LYMPHOCYTES NFR BLD AUTO: 14.3 % (ref 19.6–45.3)
MAGNESIUM SERPL-MCNC: 2.1 MG/DL (ref 1.6–2.6)
MCH RBC QN AUTO: 29.4 PG (ref 26.6–33)
MCHC RBC AUTO-ENTMCNC: 32.6 G/DL (ref 31.5–35.7)
MCV RBC AUTO: 90.2 FL (ref 79–97)
MONOCYTES # BLD AUTO: 1.09 10*3/MM3 (ref 0.1–0.9)
MONOCYTES NFR BLD AUTO: 6.5 % (ref 5–12)
NEUTROPHILS NFR BLD AUTO: 13.2 10*3/MM3 (ref 1.7–7)
NEUTROPHILS NFR BLD AUTO: 78.2 % (ref 42.7–76)
NRBC BLD AUTO-RTO: 0 /100 WBC (ref 0–0.2)
PHOSPHATE SERPL-MCNC: 2.6 MG/DL (ref 2.5–4.5)
PLATELET # BLD AUTO: 383 10*3/MM3 (ref 140–450)
PMV BLD AUTO: 11.2 FL (ref 6–12)
POTASSIUM SERPL-SCNC: 3.3 MMOL/L (ref 3.5–5.2)
POTASSIUM SERPL-SCNC: 4.2 MMOL/L (ref 3.5–5.2)
PROT SERPL-MCNC: 7.6 G/DL (ref 6–8.5)
QT INTERVAL: 442 MS
QTC INTERVAL: 543 MS
RBC # BLD AUTO: 4.28 10*6/MM3 (ref 3.77–5.28)
SODIUM SERPL-SCNC: 141 MMOL/L (ref 136–145)
WBC NRBC COR # BLD: 16.84 10*3/MM3 (ref 3.4–10.8)

## 2022-08-20 PROCEDURE — 84100 ASSAY OF PHOSPHORUS: CPT | Performed by: INTERNAL MEDICINE

## 2022-08-20 PROCEDURE — 80053 COMPREHEN METABOLIC PANEL: CPT | Performed by: HOSPITALIST

## 2022-08-20 PROCEDURE — 83605 ASSAY OF LACTIC ACID: CPT | Performed by: INTERNAL MEDICINE

## 2022-08-20 PROCEDURE — 83735 ASSAY OF MAGNESIUM: CPT | Performed by: INTERNAL MEDICINE

## 2022-08-20 PROCEDURE — 25010000002 HYDRALAZINE PER 20 MG: Performed by: HOSPITALIST

## 2022-08-20 PROCEDURE — 99232 SBSQ HOSP IP/OBS MODERATE 35: CPT | Performed by: INTERNAL MEDICINE

## 2022-08-20 PROCEDURE — 63710000001 PROMETHAZINE PER 25 MG: Performed by: INTERNAL MEDICINE

## 2022-08-20 PROCEDURE — 84132 ASSAY OF SERUM POTASSIUM: CPT | Performed by: INTERNAL MEDICINE

## 2022-08-20 PROCEDURE — 25010000002 ENOXAPARIN PER 10 MG: Performed by: INTERNAL MEDICINE

## 2022-08-20 PROCEDURE — 85025 COMPLETE CBC W/AUTO DIFF WBC: CPT | Performed by: INTERNAL MEDICINE

## 2022-08-20 PROCEDURE — 25010000002 LORAZEPAM PER 2 MG: Performed by: HOSPITALIST

## 2022-08-20 PROCEDURE — 93005 ELECTROCARDIOGRAM TRACING: CPT | Performed by: HOSPITALIST

## 2022-08-20 PROCEDURE — 99231 SBSQ HOSP IP/OBS SF/LOW 25: CPT | Performed by: SURGERY

## 2022-08-20 RX ORDER — POTASSIUM CHLORIDE 20 MEQ/1
40 TABLET, EXTENDED RELEASE ORAL EVERY 4 HOURS
Status: COMPLETED | OUTPATIENT
Start: 2022-08-20 | End: 2022-08-20

## 2022-08-20 RX ORDER — MAGNESIUM SULFATE HEPTAHYDRATE 40 MG/ML
2 INJECTION, SOLUTION INTRAVENOUS AS NEEDED
Status: DISCONTINUED | OUTPATIENT
Start: 2022-08-20 | End: 2022-08-21 | Stop reason: HOSPADM

## 2022-08-20 RX ORDER — MAGNESIUM SULFATE HEPTAHYDRATE 40 MG/ML
4 INJECTION, SOLUTION INTRAVENOUS AS NEEDED
Status: DISCONTINUED | OUTPATIENT
Start: 2022-08-20 | End: 2022-08-21 | Stop reason: HOSPADM

## 2022-08-20 RX ORDER — POTASSIUM CHLORIDE 1.5 G/1.77G
40 POWDER, FOR SOLUTION ORAL AS NEEDED
Status: DISCONTINUED | OUTPATIENT
Start: 2022-08-20 | End: 2022-08-21 | Stop reason: HOSPADM

## 2022-08-20 RX ORDER — POTASSIUM CHLORIDE 7.45 MG/ML
10 INJECTION INTRAVENOUS
Status: DISCONTINUED | OUTPATIENT
Start: 2022-08-20 | End: 2022-08-21 | Stop reason: HOSPADM

## 2022-08-20 RX ORDER — CLONAZEPAM 0.5 MG/1
0.25 TABLET ORAL DAILY
Status: DISCONTINUED | OUTPATIENT
Start: 2022-08-20 | End: 2022-08-21 | Stop reason: HOSPADM

## 2022-08-20 RX ORDER — POTASSIUM CHLORIDE 20 MEQ/1
40 TABLET, EXTENDED RELEASE ORAL AS NEEDED
Status: DISCONTINUED | OUTPATIENT
Start: 2022-08-20 | End: 2022-08-21 | Stop reason: HOSPADM

## 2022-08-20 RX ADMIN — ENOXAPARIN SODIUM 40 MG: 40 INJECTION SUBCUTANEOUS at 17:17

## 2022-08-20 RX ADMIN — AMLODIPINE BESYLATE 5 MG: 5 TABLET ORAL at 08:53

## 2022-08-20 RX ADMIN — PANTOPRAZOLE SODIUM 40 MG: 40 INJECTION, POWDER, FOR SOLUTION INTRAVENOUS at 08:53

## 2022-08-20 RX ADMIN — SUCRALFATE 1 G: 1 TABLET ORAL at 17:18

## 2022-08-20 RX ADMIN — TRAZODONE HYDROCHLORIDE 150 MG: 50 TABLET ORAL at 20:17

## 2022-08-20 RX ADMIN — PANTOPRAZOLE SODIUM 40 MG: 40 INJECTION, POWDER, FOR SOLUTION INTRAVENOUS at 17:17

## 2022-08-20 RX ADMIN — SUCRALFATE 1 G: 1 TABLET ORAL at 08:53

## 2022-08-20 RX ADMIN — LORAZEPAM 0.25 MG: 2 INJECTION INTRAMUSCULAR; INTRAVENOUS at 01:28

## 2022-08-20 RX ADMIN — LORAZEPAM 0.25 MG: 2 INJECTION INTRAMUSCULAR; INTRAVENOUS at 05:28

## 2022-08-20 RX ADMIN — POTASSIUM CHLORIDE 40 MEQ: 20 TABLET, EXTENDED RELEASE ORAL at 11:38

## 2022-08-20 RX ADMIN — SUCRALFATE 1 G: 1 TABLET ORAL at 11:38

## 2022-08-20 RX ADMIN — PROMETHAZINE HYDROCHLORIDE 25 MG: 25 TABLET ORAL at 19:30

## 2022-08-20 RX ADMIN — Medication 10 ML: at 01:28

## 2022-08-20 RX ADMIN — HYDRALAZINE HYDROCHLORIDE 10 MG: 20 INJECTION INTRAMUSCULAR; INTRAVENOUS at 17:18

## 2022-08-20 RX ADMIN — TIZANIDINE 4 MG: 4 TABLET ORAL at 20:17

## 2022-08-20 RX ADMIN — Medication 10 ML: at 08:53

## 2022-08-20 RX ADMIN — CLONAZEPAM 0.25 MG: 0.5 TABLET ORAL at 08:53

## 2022-08-20 RX ADMIN — POTASSIUM CHLORIDE 40 MEQ: 20 TABLET, EXTENDED RELEASE ORAL at 14:37

## 2022-08-20 RX ADMIN — Medication 10 ML: at 20:18

## 2022-08-21 VITALS
TEMPERATURE: 98.6 F | RESPIRATION RATE: 21 BRPM | WEIGHT: 160.27 LBS | HEART RATE: 76 BPM | HEIGHT: 62 IN | DIASTOLIC BLOOD PRESSURE: 76 MMHG | SYSTOLIC BLOOD PRESSURE: 131 MMHG | BODY MASS INDEX: 29.49 KG/M2 | OXYGEN SATURATION: 94 %

## 2022-08-21 LAB
ANION GAP SERPL CALCULATED.3IONS-SCNC: 10.8 MMOL/L (ref 5–15)
BASOPHILS # BLD AUTO: 0.06 10*3/MM3 (ref 0–0.2)
BASOPHILS NFR BLD AUTO: 0.5 % (ref 0–1.5)
BUN SERPL-MCNC: 11 MG/DL (ref 6–20)
BUN/CREAT SERPL: 19.6 (ref 7–25)
CALCIUM SPEC-SCNC: 9.5 MG/DL (ref 8.6–10.5)
CHLORIDE SERPL-SCNC: 107 MMOL/L (ref 98–107)
CO2 SERPL-SCNC: 20.2 MMOL/L (ref 22–29)
CREAT SERPL-MCNC: 0.56 MG/DL (ref 0.57–1)
DEPRECATED RDW RBC AUTO: 45.6 FL (ref 37–54)
EGFRCR SERPLBLD CKD-EPI 2021: 112 ML/MIN/1.73
EOSINOPHIL # BLD AUTO: 0.02 10*3/MM3 (ref 0–0.4)
EOSINOPHIL NFR BLD AUTO: 0.2 % (ref 0.3–6.2)
ERYTHROCYTE [DISTWIDTH] IN BLOOD BY AUTOMATED COUNT: 13.4 % (ref 12.3–15.4)
GLUCOSE SERPL-MCNC: 88 MG/DL (ref 65–99)
HCT VFR BLD AUTO: 37.1 % (ref 34–46.6)
HGB BLD-MCNC: 11.6 G/DL (ref 12–15.9)
IMM GRANULOCYTES # BLD AUTO: 0.09 10*3/MM3 (ref 0–0.05)
IMM GRANULOCYTES NFR BLD AUTO: 0.8 % (ref 0–0.5)
LYMPHOCYTES # BLD AUTO: 3.57 10*3/MM3 (ref 0.7–3.1)
LYMPHOCYTES NFR BLD AUTO: 31 % (ref 19.6–45.3)
MAGNESIUM SERPL-MCNC: 2.2 MG/DL (ref 1.6–2.6)
MCH RBC QN AUTO: 29.1 PG (ref 26.6–33)
MCHC RBC AUTO-ENTMCNC: 31.3 G/DL (ref 31.5–35.7)
MCV RBC AUTO: 93.2 FL (ref 79–97)
MONOCYTES # BLD AUTO: 1 10*3/MM3 (ref 0.1–0.9)
MONOCYTES NFR BLD AUTO: 8.7 % (ref 5–12)
NEUTROPHILS NFR BLD AUTO: 58.8 % (ref 42.7–76)
NEUTROPHILS NFR BLD AUTO: 6.78 10*3/MM3 (ref 1.7–7)
NRBC BLD AUTO-RTO: 0 /100 WBC (ref 0–0.2)
PHOSPHATE SERPL-MCNC: 2.1 MG/DL (ref 2.5–4.5)
PLATELET # BLD AUTO: 323 10*3/MM3 (ref 140–450)
PMV BLD AUTO: 11.4 FL (ref 6–12)
POTASSIUM SERPL-SCNC: 4.1 MMOL/L (ref 3.5–5.2)
RBC # BLD AUTO: 3.98 10*6/MM3 (ref 3.77–5.28)
SODIUM SERPL-SCNC: 138 MMOL/L (ref 136–145)
WBC NRBC COR # BLD: 11.52 10*3/MM3 (ref 3.4–10.8)

## 2022-08-21 PROCEDURE — 99231 SBSQ HOSP IP/OBS SF/LOW 25: CPT | Performed by: SURGERY

## 2022-08-21 PROCEDURE — 84100 ASSAY OF PHOSPHORUS: CPT | Performed by: INTERNAL MEDICINE

## 2022-08-21 PROCEDURE — 25010000002 HYDRALAZINE PER 20 MG: Performed by: HOSPITALIST

## 2022-08-21 PROCEDURE — 99239 HOSP IP/OBS DSCHRG MGMT >30: CPT | Performed by: INTERNAL MEDICINE

## 2022-08-21 PROCEDURE — 85025 COMPLETE CBC W/AUTO DIFF WBC: CPT | Performed by: INTERNAL MEDICINE

## 2022-08-21 PROCEDURE — 83735 ASSAY OF MAGNESIUM: CPT | Performed by: INTERNAL MEDICINE

## 2022-08-21 PROCEDURE — 80048 BASIC METABOLIC PNL TOTAL CA: CPT | Performed by: INTERNAL MEDICINE

## 2022-08-21 PROCEDURE — 63710000001 PROMETHAZINE PER 25 MG: Performed by: INTERNAL MEDICINE

## 2022-08-21 RX ORDER — AMLODIPINE BESYLATE 10 MG/1
10 TABLET ORAL DAILY
Status: DISCONTINUED | OUTPATIENT
Start: 2022-08-21 | End: 2022-08-21 | Stop reason: HOSPADM

## 2022-08-21 RX ORDER — AMLODIPINE BESYLATE 10 MG/1
10 TABLET ORAL DAILY
Qty: 30 TABLET | Refills: 0 | Status: SHIPPED | OUTPATIENT
Start: 2022-08-21

## 2022-08-21 RX ADMIN — ACETAMINOPHEN 650 MG: 325 TABLET, FILM COATED ORAL at 08:39

## 2022-08-21 RX ADMIN — CLONAZEPAM 0.25 MG: 0.5 TABLET ORAL at 09:29

## 2022-08-21 RX ADMIN — PROMETHAZINE HYDROCHLORIDE 25 MG: 25 TABLET ORAL at 06:50

## 2022-08-21 RX ADMIN — Medication 10 ML: at 08:31

## 2022-08-21 RX ADMIN — SUCRALFATE 1 G: 1 TABLET ORAL at 06:42

## 2022-08-21 RX ADMIN — POTASSIUM & SODIUM PHOSPHATES POWDER PACK 280-160-250 MG 2 PACKET: 280-160-250 PACK at 06:00

## 2022-08-21 RX ADMIN — TIZANIDINE 4 MG: 4 TABLET ORAL at 05:59

## 2022-08-21 RX ADMIN — HYDRALAZINE HYDROCHLORIDE 10 MG: 20 INJECTION INTRAMUSCULAR; INTRAVENOUS at 06:43

## 2022-08-21 RX ADMIN — PANTOPRAZOLE SODIUM 40 MG: 40 INJECTION, POWDER, FOR SOLUTION INTRAVENOUS at 06:42

## 2022-08-21 RX ADMIN — AMLODIPINE BESYLATE 10 MG: 10 TABLET ORAL at 09:29

## 2022-08-22 ENCOUNTER — READMISSION MANAGEMENT (OUTPATIENT)
Dept: CALL CENTER | Facility: HOSPITAL | Age: 52
End: 2022-08-22

## 2022-08-22 NOTE — OUTREACH NOTE
Prep Survey    Flowsheet Row Responses   [facility] facility patient discharged from? [facility]   Is LACE score < 7 ? No   Emergency Room discharge w/ pulse ox? No   Eligibility Readm Mgmt   Discharge diagnosis Intractable nausea, vomiting and abdominal pain   Does the patient have one of the following disease processes/diagnoses(primary or secondary)? Other   Does the patient have Home health ordered? No   Is there a DME ordered? No   Prep survey completed? Yes          JUAN CABRERA - Registered Nurse         You can access the FollowMyHealth Patient Portal offered by [facility] by registering at the following website: [email or web address]. By joining [facility]’s FollowMyHealth portal, you will also be able to view your health information using other applications (apps) compatible with our system.

## 2022-08-23 ENCOUNTER — READMISSION MANAGEMENT (OUTPATIENT)
Dept: CALL CENTER | Facility: HOSPITAL | Age: 52
End: 2022-08-23

## 2022-08-23 NOTE — OUTREACH NOTE
Medical Week 1 Survey    Flowsheet Row Responses   [facility] facility patient discharged from? [facility]   Does the patient have one of the following disease processes/diagnoses(primary or secondary)? Other   Week 1 attempt successful? No   Unsuccessful attempts Attempt 1          HÉCTOR POLLARD - Registered Nurse

## 2022-08-24 LAB
BACTERIA SPEC AEROBE CULT: NORMAL
BACTERIA SPEC AEROBE CULT: NORMAL

## 2022-08-30 ENCOUNTER — READMISSION MANAGEMENT (OUTPATIENT)
Dept: CALL CENTER | Facility: HOSPITAL | Age: 52
End: 2022-08-30

## 2022-08-30 NOTE — OUTREACH NOTE
Medical Week 2 Survey    Flowsheet Row Responses   [facility] facility patient discharged from? [facility]   Does the patient have one of the following disease processes/diagnoses(primary or secondary)? Other   Week 2 attempt successful? No   Unsuccessful attempts Attempt 1          ELADIA Garcia Registered Nurse

## 2022-09-01 ENCOUNTER — READMISSION MANAGEMENT (OUTPATIENT)
Dept: CALL CENTER | Facility: HOSPITAL | Age: 52
End: 2022-09-01

## 2022-09-01 NOTE — OUTREACH NOTE
Medical Week 2 Survey    Flowsheet Row Responses   [facility] facility patient discharged from? [facility]   Does the patient have one of the following disease processes/diagnoses(primary or secondary)? Other   Week 2 attempt successful? No   Unsuccessful attempts Attempt 2          MARY ELLEN BETANCUR - Registered Nurse

## 2022-10-18 ENCOUNTER — OFFICE VISIT (OUTPATIENT)
Dept: PSYCHIATRY | Facility: CLINIC | Age: 52
End: 2022-10-18

## 2022-10-18 ENCOUNTER — TELEPHONE (OUTPATIENT)
Dept: FAMILY MEDICINE CLINIC | Facility: CLINIC | Age: 52
End: 2022-10-18

## 2022-10-18 VITALS
SYSTOLIC BLOOD PRESSURE: 117 MMHG | HEIGHT: 61 IN | OXYGEN SATURATION: 97 % | TEMPERATURE: 97.1 F | HEART RATE: 57 BPM | BODY MASS INDEX: 28.36 KG/M2 | DIASTOLIC BLOOD PRESSURE: 76 MMHG | WEIGHT: 150.2 LBS

## 2022-10-18 DIAGNOSIS — F25.0 SCHIZOAFFECTIVE DISORDER, BIPOLAR TYPE: Primary | ICD-10-CM

## 2022-10-18 PROCEDURE — 99214 OFFICE O/P EST MOD 30 MIN: CPT

## 2022-10-18 RX ORDER — ASPIRIN 81 MG/1
81 TABLET ORAL DAILY
COMMUNITY

## 2022-10-18 RX ORDER — OLANZAPINE 5 MG/1
TABLET ORAL
Qty: 57 TABLET | Refills: 0 | Status: SHIPPED | OUTPATIENT
Start: 2022-10-18 | End: 2022-12-20

## 2022-10-18 NOTE — PROGRESS NOTES
"Subjective   Rani Martins is a 52 y.o. female who is here today for medication management follow up encounter. Only seen in office for initial encounter in June 2022.     Chief Complaint:  anxiety    HPI:  History of Present Illness     Patient reports that she was unable to get to the pharmacy to begin previously ordered Zyprexa.  She reports that since last encounter she has went to the hospital where she was told \"if I did not get my Klonopin up they said my anxiety was going to kill me... I do not want to die.\"  Patient declined UDS at today's time as she states that she cannot trust me.  Reports that anxiety has been \"real bad.\"  Reports occurrences of panic without triggers.  Reports increased irritability.  Reports that she has \"never been depressed.\"  Reports that she and sleep are \"frenenemies.\"  Reports days without sleep followed both times of hypersomnia.  Denies nightmares.  Appetite is well: Approximates 3 meals per day.  Body mass index is 27.92 kg/m².Denies AVH however throughout encounter appears to be responding to external stimuli.  Denies command hallucinations.  Denies self-harm.  Denies SI and HI.    Past Psych History:  Patient reports that she has been \"misdiagnosed with schizophrenia.\" Past documentation identifies generalized anxiety disorder and bipolar affective disorder diagnosed by Dr. Palmer. 1 previous inpatient hospitalization documented in 2015 for bipolar affective disorder, manic with psychotic features and SI/attempt.  Denies a history of TBI or seizure. Denies any knowledge to exposure to illicit substance or toxins while in utero or complications with delivery.     Previous Psych Meds: Zyprexa, wellbutrin, doxepin, abilify, prozac, ativan, valium, geodon, vraylar, latuda, lithium    Substance Abuse:  Patient denies past or current illicit substance use, however Last UDS conducted in 5/22 positive for THC, opiates, benzodiazepines. Rafy only supports benzodiazepines. Patient " denies a history of or current ETOH use. Patient denies caffeine intake. Patient denies a past history of or current nicotine use.     Social History:  Born in [place], raised in [place] to biological parents. Biological parents  when patient was approximately 7 years of age. Raised primarily by mother and maternal grandmother. Reports 1 sibling. Highschool graduate, Bachelors in criminal justice, associates in police studies. 1st marriage at 20 years of age x2 years. Current relationship reported x 12 years. 1 adult daughter. Unemployed. Past employed as a . Denies past incarceration or pending legal matters.      Family Psychiatric History:  family history includes Anxiety disorder in her mother; Depression in her mother; Heart disease in her mother; Throat cancer in her father.    Medical/Surgical History:  Past Medical History:   Diagnosis Date   • Anxiety    • Asthma     childhood asthma   • Bipolar disorder (HCC)    • Depression    • Fibromyalgia    • GERD (gastroesophageal reflux disease)    • Hypertension      Past Surgical History:   Procedure Laterality Date   • BREAST BIOPSY Left 1997    benign   • CARDIAC CATHETERIZATION N/A 07/03/2020    Procedure: Left Heart Cath;  Surgeon: Ludmila Murray MD;  Location: [facility] CATH INVASIVE LOCATION;  Service: Cardiovascular;  Laterality: N/A;   • COLONOSCOPY N/A 7/6/2022    Procedure: COLONOSCOPY FOR SCREENING;  Surgeon: Staci Samaniego MD;  Location: [facility] OR;  Service: Gastroenterology;  Laterality: N/A;   • ENDOSCOPY N/A 7/6/2022    Procedure: ESOPHAGOGASTRODUODENOSCOPY WITH BIOPSY;  Surgeon: Staci Samaniego MD;  Location: [facility];  Service: Gastroenterology;  Laterality: N/A;   • HYSTERECTOMY      30   • UPPER GASTROINTESTINAL ENDOSCOPY         Allergies   Allergen Reactions   • Lyrica [Pregabalin]    • Cymbalta [Duloxetine Hcl]    • Erythromycin    • Lithium    • Toradol [Ketorolac  Tromethamine]    • Tramadol    • Penicillins Rash     Pt received Cefepime and ceftriaxone before             Current Medications:   Current Outpatient Medications   Medication Sig Dispense Refill   • amLODIPine (NORVASC) 10 MG tablet Take 1 tablet by mouth Daily. 30 tablet 0   • aspirin 81 MG EC tablet Take 1 tablet by mouth Daily.     • tiZANidine (ZANAFLEX) 4 MG tablet Take 4 mg by mouth 3 (Three) Times a Day As Needed for Muscle Spasms.     • hydrALAZINE (APRESOLINE) 25 MG tablet Take 12.5 mg by mouth Every 8 (Eight) Hours As Needed.     • OLANZapine (ZyPREXA) 5 MG tablet Take 1 tablet by mouth Every Night for 3 days, THEN 2 tablets Every Night for 27 days. 57 tablet 0   • pantoprazole (PROTONIX) 40 MG EC tablet Take 1 tablet by mouth Daily. 90 tablet 3   • promethazine (PHENERGAN) 25 MG tablet Take 25 mg by mouth Every 6 (Six) Hours As Needed for Nausea or Vomiting.     • sucralfate (CARAFATE) 1 g tablet Take 1 g by mouth 3 (Three) Times a Day.       No current facility-administered medications for this visit.         Review of Systems   Constitutional: Positive for fatigue.   HENT: Negative for drooling, hearing loss and sore throat.    Eyes: Negative for redness and visual disturbance.   Respiratory: Negative for chest tightness and shortness of breath.    Cardiovascular: Negative for chest pain and palpitations.   Gastrointestinal: Negative for nausea and vomiting.   Endocrine: Negative for polydipsia and polyuria.   Genitourinary: Negative for frequency and urgency.   Musculoskeletal: Negative for back pain and gait problem.   Skin: Negative for pallor and rash.   Allergic/Immunologic: Negative for environmental allergies and immunocompromised state.   Neurological: Negative for tremors, seizures and headaches.   Hematological: Negative for adenopathy. Does not bruise/bleed easily.   Psychiatric/Behavioral: Positive for agitation, decreased concentration, hallucinations and sleep disturbance. Negative for  "dysphoric mood, self-injury and suicidal ideas. The patient is nervous/anxious.     denies HEENT, cardiovascular, respiratory, liver, renal, GI/, endocrine, neuro, DERM, hematology, immunology, musculoskeletal disorders.    Objective   Physical Exam  Vitals reviewed.   Constitutional:       Appearance: Normal appearance. She is normal weight.   HENT:      Head: Normocephalic.      Nose: Nose normal.      Mouth/Throat:      Mouth: Mucous membranes are moist.      Pharynx: Oropharynx is clear.   Cardiovascular:      Rate and Rhythm: Normal rate.   Pulmonary:      Effort: Pulmonary effort is normal.   Abdominal:      General: Abdomen is flat.   Musculoskeletal:         General: Normal range of motion.      Cervical back: Normal range of motion.   Skin:     General: Skin is warm and dry.   Neurological:      Mental Status: She is alert.   Psychiatric:         Attention and Perception: She is inattentive. She perceives auditory and visual hallucinations.         Mood and Affect: Mood is not anxious. Affect is labile.         Speech: Speech is tangential. Speech is not delayed.         Behavior: Behavior is not slowed.         Thought Content: Thought content is paranoid and delusional. Thought content does not include homicidal or suicidal ideation. Thought content does not include homicidal or suicidal plan.         Cognition and Memory: Cognition normal.         Judgment: Judgment is impulsive.       Blood pressure 117/76, pulse 57, temperature 97.1 °F (36.2 °C), height 156.2 cm (61.5\"), weight 68.1 kg (150 lb 3.2 oz), SpO2 97 %.    Mental Status Exam:   Hygiene:   fair  Cooperation:  Suspicious  Eye Contact:  Poor  Psychomotor Behavior:  Restless  Affect:  Inappropriate  Hopelessness: Optimistic  Speech:  Normal  Thought Process:  Disorganized, Tangential and derailment   Thought Content:  Bizarre  Suicidal:  None  Homicidal:  None  Hallucinations:  Denies; however, is observed repsonding to internal/external " stimuli offten with glances and whispers  Delusion:  Paranoid and Grandiose  Memory:  Deficits  Orientation:  Person, Place, Time and Situation  Reliability:  poor  Insight:  Poor and None  Judgement:  Poor  Impulse Control:  Poor  Physical/Medical Issues:  No       Short-term goals: Patient will be compliant with clinic appointments.  Patient will be engaged in therapy, medication compliant with minimal side effects. Patient  will report decrease of symptoms and frequency.    Long-term goals: Patient will have minimal symptoms of  with continued medication management. Patient will be compliant with treatment and appointments.     Strengths:motivation  Weaknesses: insight, coping skills, compliance, polysubstance    Functional Status: severe impairment in areas of daily functioning.  Prognosis: Guarded dependent on medication/follow up and treatment plan compliance.    Rani Martins  reports that she has never smoked. She has never used smokeless tobacco..       Assessment & Plan   Diagnoses and all orders for this visit:    1. Schizoaffective disorder, bipolar type (HCC) (Primary)  -     OLANZapine (ZyPREXA) 5 MG tablet; Take 1 tablet by mouth Every Night for 3 days, THEN 2 tablets Every Night for 27 days.  Dispense: 57 tablet; Refill: 0      -Patient declined UDS  -Rafy reviewed and shows last prescription for clonazepam in July 2022.  -Again start zyprexa zydis 5 mg disintergrating tablet PO daily X 3 days then increase to 10 mg daily thereafter for mood and psychosis.   -PA documentation reviewed and plan to contact pharmacy.  - Medication sent to pharmacy at this time.       Discussed medication and psychotherapy options.  I reintegrated with patient the importance of medication and appointment compliance.  Discussed with patient that benzodiazepines are not appropriate at this time for symptom management.  PA was done at last encounter for sublingual Zyprexa, will follow up with pharmacy.  I have concerns  for schizoaffective: bipolar type as paranoia and hallucinations appear to be present during times of euthymia described by patient and this is also consistent with past documentation. Patient was given an explanation regarding potential for increased risk of diabetes, lipids, and weight gain with use of SGA's.  Labs will be assessed as clinically indicated.  Diet was discussed especially healthy diet choices and increasing activity and exercise. Patient is hypertensive on exam, reports that she is nervous. Encouraged patient to follow up with primary care provider concerning this or to seek care in ED. Discussed the risks, benefits, and side effects of the medication; client acknowledged and verbally consented. Patient is aware to contact the [facility] with any worsening of symptom.  Patient is agreeable to go to the ER or call 911 should they begin SI/HI.      Return in about 4 weeks (around 11/15/2022), or if symptoms worsen or fail to improve, for Next scheduled follow up.      This document has been electronically signed by GABE Souza   October 18, 2022 13:16 EDT   Errors in dictation may reflect use of voice recognition software and not all errors in transcription may have been detected prior to signing.    I spent a total of 97 minutes face to face with patient for initial evaluation, discussing medication options/side effects, reviewing past documentation, and ordering medications.

## 2022-10-21 ENCOUNTER — TELEPHONE (OUTPATIENT)
Dept: FAMILY MEDICINE CLINIC | Facility: CLINIC | Age: 52
End: 2022-10-21

## 2022-10-21 NOTE — TELEPHONE ENCOUNTER
Additional information called into the insurance company on the pt's Zyprexa PA. Awaiting determination from them.

## 2022-10-25 NOTE — TELEPHONE ENCOUNTER
The PA for Zyprexa has been approved. Contacted the pt and informed her of the approval. Also informed her to contact us back with any further issues. She verbalized understanding.

## 2022-11-26 ENCOUNTER — HOSPITAL ENCOUNTER (EMERGENCY)
Facility: HOSPITAL | Age: 52
Discharge: HOME | End: 2022-11-26
Attending: FAMILY MEDICINE | Admitting: FAMILY MEDICINE

## 2022-11-26 ENCOUNTER — APPOINTMENT (OUTPATIENT)
Dept: GENERAL RADIOLOGY | Facility: HOSPITAL | Age: 52
End: 2022-11-26

## 2022-11-26 VITALS
DIASTOLIC BLOOD PRESSURE: 52 MMHG | OXYGEN SATURATION: 95 % | BODY MASS INDEX: 28.43 KG/M2 | WEIGHT: 150.6 LBS | SYSTOLIC BLOOD PRESSURE: 139 MMHG | RESPIRATION RATE: 16 BRPM | TEMPERATURE: 97.5 F | HEIGHT: 61 IN | HEART RATE: 59 BPM

## 2022-11-26 DIAGNOSIS — S52.501A CLOSED FRACTURE OF DISTAL END OF RIGHT RADIUS, UNSPECIFIED FRACTURE MORPHOLOGY, INITIAL ENCOUNTER: Primary | ICD-10-CM

## 2022-11-26 PROCEDURE — 73090 X-RAY EXAM OF FOREARM: CPT

## 2022-11-26 PROCEDURE — 73090 X-RAY EXAM OF FOREARM: CPT | Performed by: RADIOLOGY

## 2022-11-26 PROCEDURE — 99283 EMERGENCY DEPT VISIT LOW MDM: CPT

## 2022-11-26 RX ORDER — LIDOCAINE HYDROCHLORIDE 10 MG/ML
10 INJECTION, SOLUTION EPIDURAL; INFILTRATION; INTRACAUDAL; PERINEURAL ONCE
Status: DISCONTINUED | OUTPATIENT
Start: 2022-11-26 | End: 2022-11-26

## 2022-11-26 RX ORDER — HYDROCODONE BITARTRATE AND ACETAMINOPHEN 7.5; 325 MG/1; MG/1
1 TABLET ORAL ONCE
Status: COMPLETED | OUTPATIENT
Start: 2022-11-26 | End: 2022-11-26

## 2022-11-26 RX ORDER — HYDROCODONE BITARTRATE AND ACETAMINOPHEN 7.5; 325 MG/1; MG/1
1 TABLET ORAL EVERY 6 HOURS PRN
Qty: 12 TABLET | Refills: 0 | Status: SHIPPED | OUTPATIENT
Start: 2022-11-26

## 2022-11-26 RX ADMIN — HYDROCODONE BITARTRATE AND ACETAMINOPHEN 1 TABLET: 7.5; 325 TABLET ORAL at 13:00

## 2022-12-20 ENCOUNTER — OFFICE VISIT (OUTPATIENT)
Dept: PSYCHIATRY | Facility: CLINIC | Age: 52
End: 2022-12-20

## 2022-12-20 VITALS
OXYGEN SATURATION: 95 % | TEMPERATURE: 97.7 F | SYSTOLIC BLOOD PRESSURE: 123 MMHG | DIASTOLIC BLOOD PRESSURE: 82 MMHG | HEART RATE: 72 BPM

## 2022-12-20 DIAGNOSIS — F25.0 SCHIZOAFFECTIVE DISORDER, BIPOLAR TYPE: ICD-10-CM

## 2022-12-20 PROCEDURE — 99214 OFFICE O/P EST MOD 30 MIN: CPT

## 2022-12-20 RX ORDER — OLANZAPINE 15 MG/1
15 TABLET, ORALLY DISINTEGRATING ORAL NIGHTLY
Qty: 30 TABLET | Refills: 1 | Status: SHIPPED | OUTPATIENT
Start: 2022-12-20 | End: 2023-01-31

## 2022-12-20 NOTE — PROGRESS NOTES
"Subjective   Rani Martins is a 52 y.o. female who is here today for medication management follow up encounter.     Chief Complaint:  Anxiety, sleep    HPI:  History of Present Illness     Patient reports that she was able to trial medication and has not noticed any negative side effects.  Reports that she feels that it \"does nothing for me.\"  Reports that she has \"not slept in 5 days.\"  Reports that anxiety has been \"really bad.\"  Reports 2 episodes of panic and reports that she has been able to \"make it not so bad.\"  Reports that she has been \"blessed because God never gave me depression.\"  Often is making comments that is suggestive of grandiosity and delusional content, but denies when asked specifically.  Does make comments that is suggestive of thought broadcasting.  Makes many references throughout the encounter about being \"healed.\"  Conversation content is mostly associated with political concerns.  Patient is often hugging me throughout encounter identifying that I am her \"saving daniela.\"  Appetite is unchanged.  Reports 2-3 meals per day.  She denies AVH.  Does not appear to be responding to internal or external stimuli as noted at previous encounters.Denies command hallucinations.  Denies self-harm.  Denies SI and HI.    Past Psych History:  Patient reports that she has been \"misdiagnosed with schizophrenia.\" Past documentation identifies generalized anxiety disorder and bipolar affective disorder diagnosed by Dr. Palmer. 1 previous inpatient hospitalization documented in 2015 for bipolar affective disorder, manic with psychotic features and SI/attempt.  Denies a history of TBI or seizure. Denies any knowledge to exposure to illicit substance or toxins while in utero or complications with delivery.     Previous Psych Meds: Zyprexa, wellbutrin, doxepin, abilify, prozac, ativan, valium, geodon, vraylar, latuda, lithium    Substance Abuse:  Patient denies past or current illicit substance use, however Last " UDS conducted in 5/22 positive for THC, opiates, benzodiazepines. Rafy only supports benzodiazepines. Patient denies a history of or current ETOH use. Patient denies caffeine intake. Patient denies a past history of or current nicotine use.     Social History:  Born in [place], raised in [facility] to biological parents. Biological parents  when patient was approximately 7 years of age. Raised primarily by mother and maternal grandmother. Reports 1 sibling. Highschool graduate, Bachelors in criminal justice, associates in [employer] studies. 1st marriage at 20 years of age x2 years. Current relationship reported x 12 years. 1 adult daughter. Unemployed. Past employed as a . Denies past incarceration or pending legal matters.      Family Psychiatric History:  family history includes Anxiety disorder in her mother; Depression in her mother; Heart disease in her mother; Throat cancer in her father.    Medical/Surgical History:  Past Medical History:   Diagnosis Date   • Anxiety    • Asthma     childhood asthma   • Bipolar disorder (HCC)    • Depression    • Fibromyalgia    • GERD (gastroesophageal reflux disease)    • Hypertension      Past Surgical History:   Procedure Laterality Date   • BREAST BIOPSY Left 1997    benign   • CARDIAC CATHETERIZATION N/A 07/03/2020    Procedure: Left Heart Cath;  Surgeon: Ludmila Murray MD;  Location: [facility] CATH INVASIVE LOCATION;  Service: Cardiovascular;  Laterality: N/A;   • COLONOSCOPY N/A 7/6/2022    Procedure: COLONOSCOPY FOR SCREENING;  Surgeon: Staci Samaniego MD;  Location: [facility] OR;  Service: Gastroenterology;  Laterality: N/A;   • ENDOSCOPY N/A 7/6/2022    Procedure: ESOPHAGOGASTRODUODENOSCOPY WITH BIOPSY;  Surgeon: Staci Samaniego MD;  Location: [facility] OR;  Service: Gastroenterology;  Laterality: N/A;   • HYSTERECTOMY      30   • UPPER GASTROINTESTINAL ENDOSCOPY         Allergies   Allergen Reactions   •  Lyrica [Pregabalin]    • Cymbalta [Duloxetine Hcl]    • Erythromycin    • Lithium    • Toradol [Ketorolac Tromethamine]    • Tramadol    • Keflex [Cephalexin] Rash   • Penicillins Rash     Pt received Cefepime and ceftriaxone before             Current Medications:   Current Outpatient Medications   Medication Sig Dispense Refill   • amLODIPine (NORVASC) 10 MG tablet Take 1 tablet by mouth Daily. 30 tablet 0   • aspirin 81 MG EC tablet Take 1 tablet by mouth Daily.     • hydrALAZINE (APRESOLINE) 25 MG tablet Take 12.5 mg by mouth Every 8 (Eight) Hours As Needed.     • HYDROcodone-acetaminophen (NORCO) 7.5-325 MG per tablet Take 1 tablet by mouth Every 6 (Six) Hours As Needed for Moderate Pain. 12 tablet 0   • OLANZapine zydis (zyPREXA) 15 MG disintegrating tablet Place 1 tablet on the tongue Every Night. 30 tablet 1   • pantoprazole (PROTONIX) 40 MG EC tablet Take 1 tablet by mouth Daily. 90 tablet 3   • promethazine (PHENERGAN) 25 MG tablet Take 25 mg by mouth Every 6 (Six) Hours As Needed for Nausea or Vomiting.     • sucralfate (CARAFATE) 1 g tablet Take 1 g by mouth 3 (Three) Times a Day.     • tiZANidine (ZANAFLEX) 4 MG tablet Take 4 mg by mouth 3 (Three) Times a Day As Needed for Muscle Spasms.       No current facility-administered medications for this visit.         Review of Systems   Constitutional: Positive for fatigue.   HENT: Negative for drooling, hearing loss and sore throat.    Eyes: Negative for redness and visual disturbance.   Respiratory: Negative for chest tightness and shortness of breath.    Cardiovascular: Negative for chest pain and palpitations.   Gastrointestinal: Negative for nausea and vomiting.   Endocrine: Negative for polydipsia and polyuria.   Genitourinary: Negative for frequency and urgency.   Musculoskeletal: Negative for back pain and gait problem.   Skin: Negative for pallor and rash.   Allergic/Immunologic: Negative for environmental allergies and immunocompromised state.    Neurological: Negative for tremors, seizures and headaches.   Hematological: Negative for adenopathy. Does not bruise/bleed easily.   Psychiatric/Behavioral: Positive for agitation, decreased concentration, hallucinations and sleep disturbance. Negative for dysphoric mood, self-injury and suicidal ideas. The patient is nervous/anxious.     denies HEENT, cardiovascular, respiratory, liver, renal, GI/, endocrine, neuro, DERM, hematology, immunology, musculoskeletal disorders.    Objective   Physical Exam  Vitals reviewed.   Constitutional:       Appearance: Normal appearance. She is normal weight.   HENT:      Head: Normocephalic.      Nose: Nose normal.      Mouth/Throat:      Mouth: Mucous membranes are moist.      Pharynx: Oropharynx is clear.   Cardiovascular:      Rate and Rhythm: Normal rate.   Pulmonary:      Effort: Pulmonary effort is normal.   Abdominal:      General: Abdomen is flat.   Musculoskeletal:         General: Normal range of motion.      Cervical back: Normal range of motion.   Skin:     General: Skin is warm and dry.   Neurological:      Mental Status: She is alert.   Psychiatric:         Attention and Perception: She is inattentive. She perceives auditory and visual hallucinations.         Mood and Affect: Mood is not anxious. Affect is labile.         Speech: Speech is tangential. Speech is not delayed.         Behavior: Behavior is not slowed.         Thought Content: Thought content is paranoid and delusional. Thought content does not include homicidal or suicidal ideation. Thought content does not include homicidal or suicidal plan.         Cognition and Memory: Cognition normal.         Judgment: Judgment is impulsive.       Blood pressure 123/82, pulse 72, temperature 97.7 °F (36.5 °C), SpO2 95 %.    Mental Status Exam:   Hygiene:   fair  Cooperation:  Evasive  Eye Contact:  Fair  Psychomotor Behavior:  Restless  Affect:  Euphoric  Hopelessness: Optimistic  Speech:  Normal  Thought  Process:  Disorganized, Tangential and derailment   Thought Content:  Bizarre  Suicidal:  None  Homicidal:  None  Hallucinations:  Not demonstrated today  Delusion:  Paranoid and Grandiose  Memory:  Intact  Orientation:  Person, Place, Time and Situation  Reliability:  poor  Insight:  Poor and None  Judgement:  Poor  Impulse Control:  Poor  Physical/Medical Issues:  No       Short-term goals: Patient will be compliant with clinic appointments.  Patient will be engaged in therapy, medication compliant with minimal side effects. Patient  will report decrease of symptoms and frequency.    Long-term goals: Patient will have minimal symptoms of  with continued medication management. Patient will be compliant with treatment and appointments.     Strengths:motivation  Weaknesses: insight, coping skills, compliance, polysubstance    Functional Status: severe impairment in areas of daily functioning.  Prognosis: Guarded dependent on medication/follow up and treatment plan compliance.    Rani Martins  reports that she has never smoked. She has never used smokeless tobacco..       Assessment & Plan   Diagnoses and all orders for this visit:    1. Schizoaffective disorder, bipolar type (HCC)  -     OLANZapine zydis (zyPREXA) 15 MG disintegrating tablet; Place 1 tablet on the tongue Every Night.  Dispense: 30 tablet; Refill: 1      -Patient declined [facility] reviewed and shows last prescription for clonazepam in July 2022.  -Increase Zyprexa to 15 mg p.o. daily for mood  - Medication sent to pharmacy at this time.       Discussed medication and psychotherapy options.  At this time going to increase Zyprexa to better target mood. I do believe that Seroquel would be a good option for patient: However, QTc on last EKG was prolonged.  Reintegrated side effect profile associated with Zyprexa.Discussed the risks, benefits, and side effects of the medication; client acknowledged and verbally consented. Patient is aware to contact  the [facility] with any worsening of symptom.  Patient is agreeable to go to the ER or call 911 should they begin SI/HI.      Return in about 8 weeks (around 2/14/2023), or if symptoms worsen or fail to improve, for Next scheduled follow up.      This document has been electronically signed by GABE Souza   December 20, 2022 15:35 EST   Errors in dictation may reflect use of voice recognition software and not all errors in transcription may have been detected prior to signing.  .

## 2022-12-21 ENCOUNTER — TELEPHONE (OUTPATIENT)
Dept: FAMILY MEDICINE CLINIC | Facility: CLINIC | Age: 52
End: 2022-12-21

## 2023-01-04 ENCOUNTER — OFFICE VISIT (OUTPATIENT)
Dept: CARDIOLOGY | Facility: CLINIC | Age: 53
End: 2023-01-04
Payer: COMMERCIAL

## 2023-01-04 DIAGNOSIS — R00.1 BRADYCARDIA: Primary | ICD-10-CM

## 2023-01-04 DIAGNOSIS — Z01.810 PREOPERATIVE CARDIOVASCULAR EXAMINATION: ICD-10-CM

## 2023-01-04 PROCEDURE — 1160F RVW MEDS BY RX/DR IN RCRD: CPT | Performed by: NURSE PRACTITIONER

## 2023-01-04 PROCEDURE — 99213 OFFICE O/P EST LOW 20 MIN: CPT | Performed by: NURSE PRACTITIONER

## 2023-01-04 PROCEDURE — 1159F MED LIST DOCD IN RCRD: CPT | Performed by: NURSE PRACTITIONER

## 2023-01-04 RX ORDER — CLONAZEPAM 0.5 MG/1
0.5 TABLET ORAL 2 TIMES DAILY PRN
COMMUNITY
End: 2023-03-23

## 2023-01-04 NOTE — PROGRESS NOTES
[facility]             GABE Ingram Joseph T, PA  Rani Martins  1970  01/04/2023    Patient Active Problem List   Diagnosis   • NSTEMI, initial episode of care (HCC)   • Bradycardia   • Altered mental status   • Hypokalemia   • Positive urine drug screen   • Essential (primary) hypertension   • Anxiety disorder   • Nausea and vomiting   • Preoperative cardiovascular examination   • Intractable nausea and vomiting       Dear Eldon Christian PA:    Subjective     Chief Complaint   Patient presents with   • Follow-up     Per pcp   • Med Management     list       HPI:     This is a 52 y.o. female with known past medical history of anxiety, bipolar disorder,  Marijuana use and hypertension.     Rani Martins presents today for routine cardiology follow up.  Patient states she is seeking evaluation today due to needing tooth extraction by her dentist.  She states she went to her PCP office and was found to be bradycardic with heart rates in the 40s therefore she was referred here.  Today on evaluation patient is sinus rhythm with heart rates in the 70s.  Denies any recent syncope, dizziness, chest pain or shortness of breath.  Patient currently has a broken arm and is taking hydrocodone for pain.    Patient was admitted to [facility] in March 2022 with complaints of nausea, vomiting and abdominal pain.  She was found to be hypertensive with systolic blood pressure in the 200s.  Her blood pressure remained elevated with her hypertensive urgency thought to be secondary to her inability to tolerate her home BP meds a few days prior secondary to her nausea and vomiting.  She is placed on Cardene drip with her home Norvasc restarted once nausea and vomiting improved.  She had undergone recent endoscopy and her presentation was thought to be related to hyperemesis cannabis syndrome with reported use of THC Gummies in addition to smoking THC as  well.  Her blood pressure improved when she was weaned off of Cardizem drip.  Norvasc was increased.  She was also noted to have bradycardia during that admission and was felt to be multifactorial due to commendation of mild degree of hypothyroidism and marijuana usage.     • Diagnostic Testing  1. Echocardiogram 8/2021: EF 66 to 70%     All other systems were reviewed and were negative.    Patient Active Problem List   Diagnosis   • NSTEMI, initial episode of care (HCC)   • Bradycardia   • Altered mental status   • Hypokalemia   • Positive urine drug screen   • Essential (primary) hypertension   • Anxiety disorder   • Nausea and vomiting   • Preoperative cardiovascular examination   • Intractable nausea and vomiting       family history includes Anxiety disorder in her mother; Depression in her mother; Heart disease in her mother; Throat cancer in her father.     reports that she has never smoked. She has never used smokeless tobacco. She reports that she does not drink alcohol and does not use drugs.    Allergies   Allergen Reactions   • Lyrica [Pregabalin]    • Cymbalta [Duloxetine Hcl]    • Erythromycin    • Lithium    • Toradol [Ketorolac Tromethamine]    • Tramadol    • Keflex [Cephalexin] Rash   • Penicillins Rash     Pt received Cefepime and ceftriaxone before           Current Outpatient Medications:   •  amLODIPine (NORVASC) 10 MG tablet, Take 1 tablet by mouth Daily., Disp: 30 tablet, Rfl: 0  •  clonazePAM (KlonoPIN) 0.5 MG tablet, Take 0.5 mg by mouth 2 (Two) Times a Day As Needed., Disp: , Rfl:   •  HYDROcodone-acetaminophen (NORCO) 7.5-325 MG per tablet, Take 1 tablet by mouth Every 6 (Six) Hours As Needed for Moderate Pain., Disp: 12 tablet, Rfl: 0  •  OLANZapine zydis (zyPREXA) 15 MG disintegrating tablet, Place 1 tablet on the tongue Every Night., Disp: 30 tablet, Rfl: 1  •  tiZANidine (ZANAFLEX) 4 MG tablet, Take 4 mg by mouth 3 (Three) Times a Day As Needed for Muscle Spasms., Disp: , Rfl:   •   aspirin 81 MG EC tablet, Take 1 tablet by mouth Daily., Disp: , Rfl:   •  pantoprazole (PROTONIX) 40 MG EC tablet, Take 1 tablet by mouth Daily., Disp: 90 tablet, Rfl: 3  •  promethazine (PHENERGAN) 25 MG tablet, Take 25 mg by mouth Every 6 (Six) Hours As Needed for Nausea or Vomiting., Disp: , Rfl:   •  sucralfate (CARAFATE) 1 g tablet, Take 1 g by mouth 3 (Three) Times a Day., Disp: , Rfl:       Physical Exam:  I have reviewed the patient's current vital signs as documented in the patient's EMR.   There were no vitals filed for this visit.  There is no height or weight on file to calculate BMI.   There were no vitals filed for this visit.   Physical Exam  Constitutional:       General: She is not in acute distress.     Appearance: Normal appearance. She is well-developed and normal weight.   HENT:      Head: Normocephalic and atraumatic.   Eyes:      General: Lids are normal.      Conjunctiva/sclera: Conjunctivae normal.      Pupils: Pupils are equal, round, and reactive to light.   Neck:      Vascular: No carotid bruit or JVD.   Cardiovascular:      Rate and Rhythm: Normal rate and regular rhythm.      Pulses: Normal pulses.      Heart sounds: Normal heart sounds, S1 normal and S2 normal. No murmur heard.  Pulmonary:      Effort: Pulmonary effort is normal. No respiratory distress.      Breath sounds: Normal breath sounds. No wheezing.   Abdominal:      General: Bowel sounds are normal. There is no distension.      Palpations: Abdomen is soft. There is no hepatomegaly or splenomegaly.      Tenderness: There is no abdominal tenderness.   Musculoskeletal:         General: No swelling. Normal range of motion.      Cervical back: Normal range of motion and neck supple.      Right lower leg: No edema.      Left lower leg: No edema.      Comments: Right arm noted to be in cast   Skin:     General: Skin is warm and dry.      Coloration: Skin is not jaundiced.      Findings: No rash.   Neurological:      General: No  focal deficit present.      Mental Status: She is alert and oriented to person, place, and time. Mental status is at baseline.   Psychiatric:         Mood and Affect: Mood normal.         Speech: Speech normal.         Behavior: Behavior normal.         Thought Content: Thought content normal.         Judgment: Judgment normal.            DATA REVIEWED:     TTE/DIVINE:  Results for orders placed during the hospital encounter of 08/25/21    Adult Transthoracic Echo Complete w/ Color, Spectral and Contrast if necessary per protocol    Interpretation Summary  · Left ventricular ejection fraction appears to be 66 - 70%. Left ventricular systolic function is normal.  · Left ventricular diastolic function was normal.  · Estimated right ventricular systolic pressure from tricuspid regurgitation is mildly elevated (35-45 mmHg).      Laboratory evaluations:    Lab Results   Component Value Date    GLUCOSE 88 08/21/2022    BUN 11 08/21/2022    CREATININE 0.56 (L) 08/21/2022    EGFRIFNONA 81 08/26/2021    BCR 19.6 08/21/2022    K 4.1 08/21/2022    CO2 20.2 (L) 08/21/2022    CALCIUM 9.5 08/21/2022    ALBUMIN 4.54 08/20/2022    LABIL2 1.4 (L) 05/25/2016    AST 22 08/20/2022    ALT 12 08/20/2022     Lab Results   Component Value Date    WBC 11.52 (H) 08/21/2022    HGB 11.6 (L) 08/21/2022    HCT 37.1 08/21/2022    MCV 93.2 08/21/2022     08/21/2022     Lab Results   Component Value Date    CHOL 162 07/01/2020    TRIG 97 07/01/2020    HDL 31 (L) 07/01/2020     (H) 07/01/2020     Lab Results   Component Value Date    TSH 0.969 08/19/2022     Lab Results   Component Value Date    HGBA1C 5.00 01/29/2018     Lab Results   Component Value Date    ALT 12 08/20/2022     Lab Results   Component Value Date    HGBA1C 5.00 01/29/2018     Lab Results   Component Value Date    CREATININE 0.56 (L) 08/21/2022     No results found for: IRON, TIBC, FERRITIN  Lab Results   Component Value Date    INR 1.04 08/19/2022    INR 0.94 08/25/2021     PROTIME 13.8 08/19/2022    PROTIME 13.0 08/25/2021           --------------------------------------------------------------------------------------------------------------------------    ASSESSMENT/PLAN:      Diagnosis Plan   1. Bradycardia        2. Preoperative cardiovascular examination            1. Bradycardia  2. Preoperative cardiovascular examination  • Patient reports she was at her PCPs office and was noted to be bradycardic in the 40s however denies any symptoms with this.  In the office today she is currently sinus rhythm in the 70s.  She is currently taking hydrocodone due to her broken arm. This may be contributing to her bradycardia intermittently.  She would be considered a mild preoperative risk for major adverse cardiac events for teeth extraction.      This document has been @Electronically signed by GABE Ingram, 01/04/23, 9:46 AM EST.       Dictated Utilizing Dragon Dictation: Part of this note may be an electronic transcription/translation of spoken language to printed text using the Dragon Dictation System.    Follow-up appointment and medication changes provided in hand delivered After Visit Summary as well as reviewed in the room.

## 2023-01-06 ENCOUNTER — TELEPHONE (OUTPATIENT)
Dept: CARDIOLOGY | Facility: CLINIC | Age: 53
End: 2023-01-06
Payer: COMMERCIAL

## 2023-01-06 ENCOUNTER — TELEPHONE (OUTPATIENT)
Dept: CARDIOLOGY | Facility: CLINIC | Age: 53
End: 2023-01-06

## 2023-01-06 NOTE — TELEPHONE ENCOUNTER
HUB CAN READ:       Called pt and no answer lvm for pt to call us with the name of the Dr for her tooth extraction and fax number.

## 2023-01-06 NOTE — TELEPHONE ENCOUNTER
Caller: Eliezer Rani L    Relationship: Self    Best call back number: [phone]    What is the best time to reach you: ANY      What was the call regarding: PT RETURNING [facility] CALL ABOUT DENTAL PROCEDURE. PT COULD PROVIDE DOCTOR'S NAME BUT PROVIDED THE FAX AND PHONE NUMBER FOR [facility].  FAX: [phone]  PHONE: [phone]    Do you require a callback: IF NEEDED

## 2023-01-31 ENCOUNTER — OFFICE VISIT (OUTPATIENT)
Dept: PSYCHIATRY | Facility: CLINIC | Age: 53
End: 2023-01-31
Payer: COMMERCIAL

## 2023-01-31 VITALS
HEART RATE: 74 BPM | DIASTOLIC BLOOD PRESSURE: 82 MMHG | BODY MASS INDEX: 27.3 KG/M2 | TEMPERATURE: 97.8 F | SYSTOLIC BLOOD PRESSURE: 136 MMHG | OXYGEN SATURATION: 94 % | HEIGHT: 61 IN | WEIGHT: 144.6 LBS

## 2023-01-31 DIAGNOSIS — F41.1 GENERALIZED ANXIETY DISORDER: ICD-10-CM

## 2023-01-31 DIAGNOSIS — F25.0 SCHIZOAFFECTIVE DISORDER, BIPOLAR TYPE: Primary | ICD-10-CM

## 2023-01-31 PROCEDURE — 99214 OFFICE O/P EST MOD 30 MIN: CPT

## 2023-01-31 RX ORDER — MIRTAZAPINE 7.5 MG/1
7.5 TABLET, FILM COATED ORAL NIGHTLY
Qty: 30 TABLET | Refills: 1 | Status: SHIPPED | OUTPATIENT
Start: 2023-01-31 | End: 2023-03-02

## 2023-01-31 RX ORDER — TRIFLUOPERAZINE HYDROCHLORIDE 1 MG/1
1 TABLET, FILM COATED ORAL 2 TIMES DAILY
Qty: 60 TABLET | Refills: 1 | Status: SHIPPED | OUTPATIENT
Start: 2023-01-31 | End: 2023-03-23

## 2023-02-01 RX ORDER — LEVOTHYROXINE SODIUM 112 UG/1
TABLET ORAL
COMMUNITY
Start: 2022-11-21

## 2023-03-21 ENCOUNTER — TRANSCRIBE ORDERS (OUTPATIENT)
Dept: ADMINISTRATIVE | Facility: HOSPITAL | Age: 53
End: 2023-03-21
Payer: COMMERCIAL

## 2023-03-21 DIAGNOSIS — Z12.31 ENCOUNTER FOR SCREENING MAMMOGRAM FOR MALIGNANT NEOPLASM OF BREAST: Primary | ICD-10-CM

## 2023-03-23 ENCOUNTER — OFFICE VISIT (OUTPATIENT)
Dept: PSYCHIATRY | Facility: CLINIC | Age: 53
End: 2023-03-23
Payer: COMMERCIAL

## 2023-03-23 VITALS
TEMPERATURE: 98 F | HEART RATE: 92 BPM | DIASTOLIC BLOOD PRESSURE: 82 MMHG | SYSTOLIC BLOOD PRESSURE: 124 MMHG | OXYGEN SATURATION: 96 %

## 2023-03-23 DIAGNOSIS — F41.1 GENERALIZED ANXIETY DISORDER: ICD-10-CM

## 2023-03-23 DIAGNOSIS — F25.0 SCHIZOAFFECTIVE DISORDER, BIPOLAR TYPE: ICD-10-CM

## 2023-03-23 PROCEDURE — 3079F DIAST BP 80-89 MM HG: CPT

## 2023-03-23 PROCEDURE — 99214 OFFICE O/P EST MOD 30 MIN: CPT

## 2023-03-23 PROCEDURE — 3074F SYST BP LT 130 MM HG: CPT

## 2023-03-23 PROCEDURE — 1160F RVW MEDS BY RX/DR IN RCRD: CPT

## 2023-03-23 PROCEDURE — 1159F MED LIST DOCD IN RCRD: CPT

## 2023-03-23 RX ORDER — QUETIAPINE 300 MG/1
300 TABLET, FILM COATED, EXTENDED RELEASE ORAL NIGHTLY
Qty: 30 TABLET | Refills: 1 | Status: SHIPPED | OUTPATIENT
Start: 2023-03-23

## 2023-03-23 NOTE — PROGRESS NOTES
"Subjective   Rani Martins is a 52 y.o. female who is here today for medication management follow up encounter.     Chief Complaint:  Anxiety, sleep    HPI:  History of Present Illness     Patient reports that since last encounter she has undergone her dental procedure and has tolerated this well.  Reports that anxiety is \"all the time, the medicine has not helped\" denies any depression symptoms.  Denies any grandiosity or delusional content at today's encounter.  Appetite is unchanged per patient report.  Patient declines weight at today's encounter.  Unsure of weight loss or weight gain.  Unable to calculate BMI.  Reports that she is \"not slept in days.\"  Does not further elaborate.  Denies any hallucinations at today's encounter, but does appear to be responding to internal stimuli with glances.  Denies command hallucinations.  Denies self-harm.  Denies SI and HI.    Past Psych History:  Patient reports that she has been \"misdiagnosed with schizophrenia.\" Past documentation identifies generalized anxiety disorder and bipolar affective disorder diagnosed by Dr. Palmer. 1 previous inpatient hospitalization documented in 2015 for bipolar affective disorder, manic with psychotic features and SI/attempt.  Denies a history of TBI or seizure. Denies any knowledge to exposure to illicit substance or toxins while in utero or complications with delivery.     Previous Psych Meds: Zyprexa, wellbutrin, doxepin, abilify, prozac, ativan, valium, geodon, vraylar, latuda, lithium    Substance Abuse:  Patient denies past or current illicit substance use, however Last UDS conducted in 5/22 positive for THC, opiates, benzodiazepines. [facility] only supports benzodiazepines. Patient denies a history of or current ETOH use. Patient denies caffeine intake. Patient denies a past history of or current nicotine use.     Social History:  Born in [place], raised in [facility] to biological parents. Biological parents "  when patient was approximately 7 years of age. Raised primarily by mother and maternal grandmother. Reports 1 sibling. Highschool graduate, Bachelors in criminal justice, associates in police studies. 1st marriage at 20 years of age x2 years. Current relationship reported x 12 years. 1 adult daughter. Unemployed. Past employed as a . Denies past incarceration or pending legal matters.      Family Psychiatric History:  family history includes Anxiety disorder in her mother; Depression in her mother; Heart disease in her mother; Throat cancer in her father.    Medical/Surgical History:  Past Medical History:   Diagnosis Date   • Anxiety    • Asthma     childhood asthma   • Bipolar disorder (HCC)    • Depression    • Fibromyalgia    • GERD (gastroesophageal reflux disease)    • Hypertension      Past Surgical History:   Procedure Laterality Date   • BREAST BIOPSY Left 1997    benign   • CARDIAC CATHETERIZATION N/A 07/03/2020    Procedure: Left Heart Cath;  Surgeon: Ludmila Murray MD;  Location: [facility] CATH INVASIVE LOCATION;  Service: Cardiovascular;  Laterality: N/A;   • COLONOSCOPY N/A 7/6/2022    Procedure: COLONOSCOPY FOR SCREENING;  Surgeon: Staci Samaniego MD;  Location: [facility];  Service: Gastroenterology;  Laterality: N/A;   • ENDOSCOPY N/A 7/6/2022    Procedure: ESOPHAGOGASTRODUODENOSCOPY WITH BIOPSY;  Surgeon: Staci Samaniego MD;  Location: [facility];  Service: Gastroenterology;  Laterality: N/A;   • HYSTERECTOMY      30   • UPPER GASTROINTESTINAL ENDOSCOPY         Allergies   Allergen Reactions   • Lyrica [Pregabalin]    • Cymbalta [Duloxetine Hcl]    • Erythromycin    • Lithium    • Toradol [Ketorolac Tromethamine]    • Tramadol    • Keflex [Cephalexin] Rash   • Penicillins Rash     Pt received Cefepime and ceftriaxone before             Current Medications:   Current Outpatient Medications   Medication Sig Dispense Refill   • amLODIPine (NORVASC) 10 MG  tablet Take 1 tablet by mouth Daily. 30 tablet 0   • aspirin 81 MG EC tablet Take 1 tablet by mouth Daily.     • HYDROcodone-acetaminophen (NORCO) 7.5-325 MG per tablet Take 1 tablet by mouth Every 6 (Six) Hours As Needed for Moderate Pain. 12 tablet 0   • levothyroxine (SYNTHROID, LEVOTHROID) 112 MCG tablet      • mirtazapine (REMERON) 7.5 MG tablet Take 1 tablet by mouth Every Night for 30 days. 30 tablet 1   • pantoprazole (PROTONIX) 40 MG EC tablet Take 1 tablet by mouth Daily. 90 tablet 3   • promethazine (PHENERGAN) 25 MG tablet Take 25 mg by mouth Every 6 (Six) Hours As Needed for Nausea or Vomiting.     • QUEtiapine XR (SEROquel XR) 300 MG 24 hr tablet Take 1 tablet by mouth Every Night. 30 tablet 1   • sucralfate (CARAFATE) 1 g tablet Take 1 g by mouth 3 (Three) Times a Day.     • tiZANidine (ZANAFLEX) 4 MG tablet Take 4 mg by mouth 3 (Three) Times a Day As Needed for Muscle Spasms.       No current facility-administered medications for this visit.         Review of Systems   Constitutional: Positive for fatigue.   HENT: Negative for drooling, hearing loss and sore throat.    Eyes: Negative for redness and visual disturbance.   Respiratory: Negative for chest tightness and shortness of breath.    Cardiovascular: Negative for chest pain and palpitations.   Gastrointestinal: Negative for nausea and vomiting.   Endocrine: Negative for polydipsia and polyuria.   Genitourinary: Negative for frequency and urgency.   Musculoskeletal: Negative for back pain and gait problem.   Skin: Negative for pallor and rash.   Allergic/Immunologic: Negative for environmental allergies and immunocompromised state.   Neurological: Negative for tremors, seizures and headaches.   Hematological: Negative for adenopathy. Does not bruise/bleed easily.   Psychiatric/Behavioral: Positive for agitation, decreased concentration, hallucinations and sleep disturbance. Negative for dysphoric mood, self-injury and suicidal ideas. The patient  is nervous/anxious.     denies HEENT, cardiovascular, respiratory, liver, renal, GI/, endocrine, neuro, DERM, hematology, immunology, musculoskeletal disorders.    Objective   Physical Exam  Vitals reviewed.   Constitutional:       Appearance: Normal appearance. She is normal weight.   HENT:      Head: Normocephalic.      Nose: Nose normal.      Mouth/Throat:      Mouth: Mucous membranes are moist.      Pharynx: Oropharynx is clear.   Cardiovascular:      Rate and Rhythm: Normal rate.   Pulmonary:      Effort: Pulmonary effort is normal.   Abdominal:      General: Abdomen is flat.   Musculoskeletal:         General: Normal range of motion.      Cervical back: Normal range of motion.   Skin:     General: Skin is warm and dry.   Neurological:      Mental Status: She is alert.   Psychiatric:         Attention and Perception: She is inattentive. She perceives  visual hallucinations.         Mood and Affect: Mood is not anxious. Affect is labile.         Speech: Speech is tangential. Speech is not delayed.         Behavior: Behavior is not slowed.         Thought Content: Thought content is paranoid and delusional. Thought content does not include homicidal or suicidal ideation. Thought content does not include homicidal or suicidal plan.         Cognition and Memory: Cognition normal.         Judgment: Judgment is impulsive.       Blood pressure 124/82, pulse 92, temperature 98 °F (36.7 °C), SpO2 96 %.    Mental Status Exam:   Hygiene:   fair  Cooperation:  Evasive  Eye Contact:  Fair  Psychomotor Behavior:  Restless  Affect:  Labile  Hopelessness: Optimistic  Speech:  Normal  Thought Process:  Circum  Thought Content:  Normal and Mood congruent  Suicidal:  None  Homicidal:  None  Hallucinations:  Not demonstrated today  Delusion:  Paranoid  Memory:  Intact  Orientation:  Person, Place, Time and Situation  Reliability:  poor  Insight:  Poor and None  Judgement:  Poor  Impulse Control:  Poor  Physical/Medical Issues:   No       Short-term goals: Patient will be compliant with clinic appointments.  Patient will be engaged in therapy, medication compliant with minimal side effects. Patient  will report decrease of symptoms and frequency.    Long-term goals: Patient will have minimal symptoms of  with continued medication management. Patient will be compliant with treatment and appointments.     Strengths:motivation  Weaknesses: insight, coping skills, compliance, polysubstance    Functional Status: severe impairment in areas of daily functioning.  Prognosis: Guarded dependent on medication/follow up and treatment plan compliance.    Rani Martins  reports that she has never smoked. She has never used smokeless tobacco..       EKG: normal sinus rhythm, sinus bradycardia.  QT/QTc: 462/450 ms      Assessment & Plan      Diagnosis Plan   1. Schizoaffective disorder, bipolar type (HCC)  QUEtiapine XR (SEROquel XR) 300 MG 24 hr tablet      2. Generalized anxiety disorder  QUEtiapine XR (SEROquel XR) 300 MG 24 hr tablet          -Patient declined UDS  -Discontinue Stelazine and Remeron per patient request  -Start Seroquel  mg p.o. nightly for schizoaffective disorder and anxiety  - Medication sent to pharmacy at this time.       Discussed medication and psychotherapy options.  EKG reviewed from PCP office and interpreted above.  QTc is within normal limits.  At this time I am going to start Seroquel XR as described above for the above diagnosis.Patient was instructed on medication side effects, benefits, and also of no treatment.  Patient was given an explanation regarding potential for increased risk of diabetes, lipids, and weight gain.  Labs will be assessed as clinically indicated.  Diet was discussed especially healthy diet choices and increasing activity and exercise.  Patient was strongly urged to continue weight maintance or weight loss efforts.  Patient reported verbalized understanding of instructions. Discussed the risks,  benefits, and side effects of the medication; client acknowledged and verbally consented. Patient is aware to contact the [facility] with any worsening of symptom.  Patient is agreeable to go to the ER or call 911 should they begin SI/HI.      Return in about 4 weeks (around 4/20/2023), or if symptoms worsen or fail to improve, for Next scheduled follow up.      This document has been electronically signed by GABE Souza   March 23, 2023 15:18 EDT   Errors in dictation may reflect use of voice recognition software and not all errors in transcription may have been detected prior to signing.  .

## 2023-04-24 ENCOUNTER — HOSPITAL ENCOUNTER (OUTPATIENT)
Dept: MAMMOGRAPHY | Facility: HOSPITAL | Age: 53
Discharge: HOME | End: 2023-04-24
Admitting: PHYSICIAN ASSISTANT
Payer: COMMERCIAL

## 2023-04-24 DIAGNOSIS — Z12.31 ENCOUNTER FOR SCREENING MAMMOGRAM FOR MALIGNANT NEOPLASM OF BREAST: ICD-10-CM

## 2023-04-24 PROCEDURE — 77067 SCR MAMMO BI INCL CAD: CPT | Performed by: RADIOLOGY

## 2023-04-24 PROCEDURE — 77063 BREAST TOMOSYNTHESIS BI: CPT | Performed by: RADIOLOGY

## 2023-04-24 PROCEDURE — 77067 SCR MAMMO BI INCL CAD: CPT

## 2023-04-24 PROCEDURE — 77063 BREAST TOMOSYNTHESIS BI: CPT

## 2023-08-11 NOTE — TELEPHONE ENCOUNTER
Physician Progress Note      PATIENT:               Noemi Nath  CSN #:                  [id number]  :                       1951  ADMIT DATE:       2023 8:28 PM  [address] [facility] DATE:        2023 6:08 PM  RESPONDING  PROVIDER #:        Milton Barth MD          QUERY TEXT:    Patient admitted with malignant mass in the ascending colon, noted to have   Pathology report: ASCENDING COLON MASS, BIOPSY:-SUPERFICIAL FRAGMENTS OF   INVASIVE WELL TO MODERATELY DIFFERENTIATED ADENOCARCINOMA; INVADING INTO THE   LAMINA PROPRIA. If possible, please document in progress notes and discharge   summary if you are evaluating and/or treating any of the following: The medical record reflects the following:  Risk Factors: Colon Mass  Clinical Indicators: Pathology report: ASCENDING COLON MASS,   BIOPSY:-SUPERFICIAL FRAGMENTS OF INVASIVE WELL TO MODERATELY DIFFERENTIATED   ADENOCARCINOMA; INVADING INTO THE LAMINA PROPRIA.   primary team: \"Patient   has large malignant mass in the ascending colon with partial obstruction and   he would be discharged following up with medical oncology for possible   palliative chemotherapy\"    Treatment: EGD/Colonoscopy w/ biopsy, Pathology, follow up care possible   palliative chemotherapy    Thank you, please reach out for any questions! Lora Kramer RN, CCDS, [facility] Supervisor [phone]  Options provided:  -- malignant mass in the ascending colon is Adenocarcinoma invading into the   lamina propria  -- Pathology result of superficial fragments of invasive well to moderately   differentiated adenocarcinoma is not clinically significant  -- Other - I will add my own diagnosis  -- Disagree - Not applicable / Not valid  -- Disagree - Clinically unable to determine / Unknown  -- Refer to Clinical Documentation Reviewer    PROVIDER RESPONSE TEXT:    This patient has a malignant mass in the ascending colon is Adenocarcinoma   invading into the lamina propria.     Query created by: Dilan Chappell Rx request.  Meds are due for refill and next appt is 09-07-17 with Velia.

## 2024-05-14 ENCOUNTER — TRANSCRIBE ORDERS (OUTPATIENT)
Dept: ADMINISTRATIVE | Facility: HOSPITAL | Age: 54
End: 2024-05-14
Payer: COMMERCIAL

## 2024-05-14 DIAGNOSIS — Z12.31 SCREENING MAMMOGRAM FOR BREAST CANCER: Primary | ICD-10-CM

## 2024-05-22 ENCOUNTER — HOSPITAL ENCOUNTER (OUTPATIENT)
Facility: HOSPITAL | Age: 54
Discharge: HOME | End: 2024-05-22
Admitting: PHYSICIAN ASSISTANT
Payer: COMMERCIAL

## 2024-05-22 DIAGNOSIS — Z12.31 SCREENING MAMMOGRAM FOR BREAST CANCER: ICD-10-CM

## 2024-05-22 PROCEDURE — 77067 SCR MAMMO BI INCL CAD: CPT

## 2024-05-22 PROCEDURE — 77063 BREAST TOMOSYNTHESIS BI: CPT

## 2024-08-08 ENCOUNTER — TRANSCRIBE ORDERS (OUTPATIENT)
Dept: ADMINISTRATIVE | Facility: HOSPITAL | Age: 54
End: 2024-08-08
Payer: COMMERCIAL

## 2024-08-08 DIAGNOSIS — R10.11 RUQ ABDOMINAL PAIN: Primary | ICD-10-CM

## 2024-08-29 ENCOUNTER — HOSPITAL ENCOUNTER (OUTPATIENT)
Dept: ULTRASOUND IMAGING | Facility: HOSPITAL | Age: 54
Discharge: HOME | End: 2024-08-29
Admitting: PHYSICIAN ASSISTANT
Payer: COMMERCIAL

## 2024-08-29 DIAGNOSIS — R10.11 RUQ ABDOMINAL PAIN: ICD-10-CM

## 2024-08-29 PROCEDURE — 76700 US EXAM ABDOM COMPLETE: CPT

## 2024-09-29 ENCOUNTER — APPOINTMENT (OUTPATIENT)
Dept: CT IMAGING | Facility: HOSPITAL | Age: 54
End: 2024-09-29
Payer: COMMERCIAL

## 2024-09-29 ENCOUNTER — HOSPITAL ENCOUNTER (OUTPATIENT)
Facility: HOSPITAL | Age: 54
Setting detail: OBSERVATION
Discharge: HOME | End: 2024-09-30
Attending: EMERGENCY MEDICINE | Admitting: SURGERY
Payer: COMMERCIAL

## 2024-09-29 ENCOUNTER — APPOINTMENT (OUTPATIENT)
Dept: ULTRASOUND IMAGING | Facility: HOSPITAL | Age: 54
End: 2024-09-29
Payer: COMMERCIAL

## 2024-09-29 DIAGNOSIS — R11.10 INTRACTABLE VOMITING: ICD-10-CM

## 2024-09-29 DIAGNOSIS — R10.11 RIGHT UPPER QUADRANT ABDOMINAL PAIN: Primary | ICD-10-CM

## 2024-09-29 PROBLEM — R10.9 ABDOMINAL PAIN: Status: ACTIVE | Noted: 2024-09-29

## 2024-09-29 LAB
ACETONE BLD QL: ABNORMAL
ALBUMIN SERPL-MCNC: 4.6 G/DL (ref 3.5–5.2)
ALBUMIN/GLOB SERPL: 1.2 G/DL
ALP SERPL-CCNC: 162 U/L (ref 39–117)
ALT SERPL W P-5'-P-CCNC: 183 U/L (ref 1–33)
AMPHET+METHAMPHET UR QL: NEGATIVE
AMPHETAMINES UR QL: NEGATIVE
ANION GAP SERPL CALCULATED.3IONS-SCNC: 21 MMOL/L (ref 5–15)
APAP SERPL-MCNC: <5 MCG/ML (ref 0–30)
AST SERPL-CCNC: 63 U/L (ref 1–32)
ATMOSPHERIC PRESS: 723 MMHG
BARBITURATES UR QL SCN: NEGATIVE
BASE EXCESS BLDV CALC-SCNC: -2.6 MMOL/L (ref 0–2)
BASOPHILS # BLD AUTO: 0.08 10*3/MM3 (ref 0–0.2)
BASOPHILS NFR BLD AUTO: 0.6 % (ref 0–1.5)
BDY SITE: ABNORMAL
BENZODIAZ UR QL SCN: POSITIVE
BILIRUB SERPL-MCNC: 0.8 MG/DL (ref 0–1.2)
BILIRUB UR QL STRIP: NEGATIVE
BUN SERPL-MCNC: 12 MG/DL (ref 6–20)
BUN/CREAT SERPL: 14.5 (ref 7–25)
BUPRENORPHINE SERPL-MCNC: NEGATIVE NG/ML
CALCIUM SPEC-SCNC: 9.9 MG/DL (ref 8.6–10.5)
CANNABINOIDS SERPL QL: POSITIVE
CHLORIDE SERPL-SCNC: 102 MMOL/L (ref 98–107)
CLARITY UR: CLEAR
CO2 BLDA-SCNC: 23.1 MMOL/L (ref 22–33)
CO2 SERPL-SCNC: 17 MMOL/L (ref 22–29)
COCAINE UR QL: NEGATIVE
COHGB MFR BLD: 1 % (ref 0–5)
COLOR UR: YELLOW
CREAT SERPL-MCNC: 0.83 MG/DL (ref 0.57–1)
CRP SERPL-MCNC: 0.49 MG/DL (ref 0–0.5)
D-LACTATE SERPL-SCNC: 1.9 MMOL/L (ref 0.5–2)
DEPRECATED RDW RBC AUTO: 47.4 FL (ref 37–54)
EGFRCR SERPLBLD CKD-EPI 2021: 84.9 ML/MIN/1.73
EOSINOPHIL # BLD AUTO: 0.22 10*3/MM3 (ref 0–0.4)
EOSINOPHIL NFR BLD AUTO: 1.8 % (ref 0.3–6.2)
ERYTHROCYTE [DISTWIDTH] IN BLOOD BY AUTOMATED COUNT: 13.4 % (ref 12.3–15.4)
ETHANOL BLD-MCNC: <10 MG/DL (ref 0–10)
ETHANOL UR QL: <0.01 %
FENTANYL UR-MCNC: NEGATIVE NG/ML
GLOBULIN UR ELPH-MCNC: 3.7 GM/DL
GLUCOSE SERPL-MCNC: 171 MG/DL (ref 65–99)
GLUCOSE UR STRIP-MCNC: NEGATIVE MG/DL
HAV IGM SERPL QL IA: NORMAL
HBV CORE IGM SERPL QL IA: NORMAL
HBV SURFACE AG SERPL QL IA: NORMAL
HCO3 BLDV-SCNC: 22 MMOL/L (ref 22–28)
HCT VFR BLD AUTO: 50.1 % (ref 34–46.6)
HCV AB SER QL: NORMAL
HGB BLD-MCNC: 15.7 G/DL (ref 12–15.9)
HGB BLDA-MCNC: 14.9 G/DL (ref 13.5–17.5)
HGB UR QL STRIP.AUTO: NEGATIVE
HOLD SPECIMEN: NORMAL
HOLD SPECIMEN: NORMAL
IMM GRANULOCYTES # BLD AUTO: 0.05 10*3/MM3 (ref 0–0.05)
IMM GRANULOCYTES NFR BLD AUTO: 0.4 % (ref 0–0.5)
INHALED O2 CONCENTRATION: 21 %
IRON 24H UR-MRATE: 87 MCG/DL (ref 37–145)
IRON SATN MFR SERPL: 21 % (ref 20–50)
KETONES UR QL STRIP: ABNORMAL
LEUKOCYTE ESTERASE UR QL STRIP.AUTO: NEGATIVE
LIPASE SERPL-CCNC: 20 U/L (ref 13–60)
LYMPHOCYTES # BLD AUTO: 2.32 10*3/MM3 (ref 0.7–3.1)
LYMPHOCYTES NFR BLD AUTO: 18.7 % (ref 19.6–45.3)
Lab: ABNORMAL
MAGNESIUM SERPL-MCNC: 2.1 MG/DL (ref 1.6–2.6)
MCH RBC QN AUTO: 30 PG (ref 26.6–33)
MCHC RBC AUTO-ENTMCNC: 31.3 G/DL (ref 31.5–35.7)
MCV RBC AUTO: 95.8 FL (ref 79–97)
METHADONE UR QL SCN: NEGATIVE
METHGB BLD QL: 0.4 % (ref 0–3)
MODALITY: ABNORMAL
MONOCYTES # BLD AUTO: 0.47 10*3/MM3 (ref 0.1–0.9)
MONOCYTES NFR BLD AUTO: 3.8 % (ref 5–12)
NEUTROPHILS NFR BLD AUTO: 74.7 % (ref 42.7–76)
NEUTROPHILS NFR BLD AUTO: 9.27 10*3/MM3 (ref 1.7–7)
NITRITE UR QL STRIP: NEGATIVE
NRBC BLD AUTO-RTO: 0 /100 WBC (ref 0–0.2)
OPIATES UR QL: POSITIVE
OXYCODONE UR QL SCN: POSITIVE
OXYHGB MFR BLDV: 54 % (ref 45–75)
PCO2 BLDV: 36.9 MM HG (ref 41–51)
PCP UR QL SCN: NEGATIVE
PH BLDV: 7.38 PH UNITS (ref 7.32–7.42)
PH UR STRIP.AUTO: 5.5 [PH] (ref 5–8)
PLATELET # BLD AUTO: 369 10*3/MM3 (ref 140–450)
PMV BLD AUTO: 10.2 FL (ref 6–12)
PO2 BLDV: 29.1 MM HG (ref 27–53)
POTASSIUM SERPL-SCNC: 3.6 MMOL/L (ref 3.5–5.2)
PROT SERPL-MCNC: 8.3 G/DL (ref 6–8.5)
PROT UR QL STRIP: ABNORMAL
QT INTERVAL: 488 MS
QTC INTERVAL: 470 MS
RBC # BLD AUTO: 5.23 10*6/MM3 (ref 3.77–5.28)
SALICYLATES SERPL-MCNC: <0.3 MG/DL
SAO2 % BLDCOV: 54.8 % (ref 45–75)
SODIUM SERPL-SCNC: 140 MMOL/L (ref 136–145)
SP GR UR STRIP: >1.03 (ref 1–1.03)
TIBC SERPL-MCNC: 419 MCG/DL (ref 298–536)
TRANSFERRIN SERPL-MCNC: 281 MG/DL (ref 200–360)
TRICYCLICS UR QL SCN: NEGATIVE
TROPONIN T SERPL HS-MCNC: <6 NG/L
UROBILINOGEN UR QL STRIP: ABNORMAL
VENTILATOR MODE: ABNORMAL
WBC NRBC COR # BLD AUTO: 12.41 10*3/MM3 (ref 3.4–10.8)
WHOLE BLOOD HOLD COAG: NORMAL
WHOLE BLOOD HOLD SPECIMEN: NORMAL

## 2024-09-29 PROCEDURE — 81003 URINALYSIS AUTO W/O SCOPE: CPT | Performed by: PHYSICIAN ASSISTANT

## 2024-09-29 PROCEDURE — 93005 ELECTROCARDIOGRAM TRACING: CPT | Performed by: PHYSICIAN ASSISTANT

## 2024-09-29 PROCEDURE — 86140 C-REACTIVE PROTEIN: CPT | Performed by: PHYSICIAN ASSISTANT

## 2024-09-29 PROCEDURE — 76705 ECHO EXAM OF ABDOMEN: CPT | Performed by: RADIOLOGY

## 2024-09-29 PROCEDURE — G0378 HOSPITAL OBSERVATION PER HR: HCPCS

## 2024-09-29 PROCEDURE — 82077 ASSAY SPEC XCP UR&BREATH IA: CPT | Performed by: PHYSICIAN ASSISTANT

## 2024-09-29 PROCEDURE — 80179 DRUG ASSAY SALICYLATE: CPT | Performed by: PHYSICIAN ASSISTANT

## 2024-09-29 PROCEDURE — 82820 HEMOGLOBIN-OXYGEN AFFINITY: CPT

## 2024-09-29 PROCEDURE — 96375 TX/PRO/DX INJ NEW DRUG ADDON: CPT

## 2024-09-29 PROCEDURE — 25010000002 AZTREONAM PER 500 MG: Performed by: PHYSICIAN ASSISTANT

## 2024-09-29 PROCEDURE — 84466 ASSAY OF TRANSFERRIN: CPT | Performed by: PHYSICIAN ASSISTANT

## 2024-09-29 PROCEDURE — 83605 ASSAY OF LACTIC ACID: CPT | Performed by: PHYSICIAN ASSISTANT

## 2024-09-29 PROCEDURE — 96365 THER/PROPH/DIAG IV INF INIT: CPT

## 2024-09-29 PROCEDURE — 99285 EMERGENCY DEPT VISIT HI MDM: CPT

## 2024-09-29 PROCEDURE — 83540 ASSAY OF IRON: CPT | Performed by: PHYSICIAN ASSISTANT

## 2024-09-29 PROCEDURE — 83690 ASSAY OF LIPASE: CPT | Performed by: PHYSICIAN ASSISTANT

## 2024-09-29 PROCEDURE — 25510000001 IOPAMIDOL 61 % SOLUTION: Performed by: EMERGENCY MEDICINE

## 2024-09-29 PROCEDURE — 87040 BLOOD CULTURE FOR BACTERIA: CPT | Performed by: PHYSICIAN ASSISTANT

## 2024-09-29 PROCEDURE — 82805 BLOOD GASES W/O2 SATURATION: CPT

## 2024-09-29 PROCEDURE — 25810000003 SODIUM CHLORIDE 0.9 % SOLUTION: Performed by: EMERGENCY MEDICINE

## 2024-09-29 PROCEDURE — 25010000002 PROCHLORPERAZINE 10 MG/2ML SOLUTION: Performed by: EMERGENCY MEDICINE

## 2024-09-29 PROCEDURE — 25010000002 HYDROMORPHONE 1 MG/ML SOLUTION: Performed by: EMERGENCY MEDICINE

## 2024-09-29 PROCEDURE — 82009 KETONE BODYS QUAL: CPT | Performed by: PHYSICIAN ASSISTANT

## 2024-09-29 PROCEDURE — 74177 CT ABD & PELVIS W/CONTRAST: CPT | Performed by: RADIOLOGY

## 2024-09-29 PROCEDURE — 84484 ASSAY OF TROPONIN QUANT: CPT | Performed by: PHYSICIAN ASSISTANT

## 2024-09-29 PROCEDURE — 36415 COLL VENOUS BLD VENIPUNCTURE: CPT

## 2024-09-29 PROCEDURE — 80307 DRUG TEST PRSMV CHEM ANLYZR: CPT | Performed by: PHYSICIAN ASSISTANT

## 2024-09-29 PROCEDURE — 80053 COMPREHEN METABOLIC PANEL: CPT | Performed by: PHYSICIAN ASSISTANT

## 2024-09-29 PROCEDURE — 96376 TX/PRO/DX INJ SAME DRUG ADON: CPT

## 2024-09-29 PROCEDURE — 93010 ELECTROCARDIOGRAM REPORT: CPT | Performed by: SPECIALIST

## 2024-09-29 PROCEDURE — 85025 COMPLETE CBC W/AUTO DIFF WBC: CPT | Performed by: PHYSICIAN ASSISTANT

## 2024-09-29 PROCEDURE — 80074 ACUTE HEPATITIS PANEL: CPT | Performed by: PHYSICIAN ASSISTANT

## 2024-09-29 PROCEDURE — 25010000002 ONDANSETRON PER 1 MG: Performed by: EMERGENCY MEDICINE

## 2024-09-29 PROCEDURE — 74177 CT ABD & PELVIS W/CONTRAST: CPT

## 2024-09-29 PROCEDURE — 76705 ECHO EXAM OF ABDOMEN: CPT

## 2024-09-29 PROCEDURE — 80143 DRUG ASSAY ACETAMINOPHEN: CPT | Performed by: PHYSICIAN ASSISTANT

## 2024-09-29 PROCEDURE — 25010000002 MORPHINE PER 10 MG: Performed by: SURGERY

## 2024-09-29 PROCEDURE — 83735 ASSAY OF MAGNESIUM: CPT | Performed by: PHYSICIAN ASSISTANT

## 2024-09-29 RX ORDER — AMLODIPINE BESYLATE 10 MG/1
10 TABLET ORAL DAILY
Status: DISCONTINUED | OUTPATIENT
Start: 2024-09-30 | End: 2024-09-30 | Stop reason: HOSPADM

## 2024-09-29 RX ORDER — LORAZEPAM 1 MG/1
1 TABLET ORAL ONCE
Status: COMPLETED | OUTPATIENT
Start: 2024-09-29 | End: 2024-09-29

## 2024-09-29 RX ORDER — PROCHLORPERAZINE EDISYLATE 5 MG/ML
10 INJECTION INTRAMUSCULAR; INTRAVENOUS ONCE
Status: COMPLETED | OUTPATIENT
Start: 2024-09-29 | End: 2024-09-29

## 2024-09-29 RX ORDER — PANTOPRAZOLE SODIUM 40 MG/1
40 TABLET, DELAYED RELEASE ORAL
Status: DISCONTINUED | OUTPATIENT
Start: 2024-09-29 | End: 2024-09-30 | Stop reason: HOSPADM

## 2024-09-29 RX ORDER — AMLODIPINE BESYLATE 10 MG/1
10 TABLET ORAL DAILY
Status: CANCELLED | OUTPATIENT
Start: 2024-09-29

## 2024-09-29 RX ORDER — IOPAMIDOL 612 MG/ML
100 INJECTION, SOLUTION INTRAVASCULAR
Status: COMPLETED | OUTPATIENT
Start: 2024-09-29 | End: 2024-09-29

## 2024-09-29 RX ORDER — ONDANSETRON 2 MG/ML
4 INJECTION INTRAMUSCULAR; INTRAVENOUS ONCE
Status: COMPLETED | OUTPATIENT
Start: 2024-09-29 | End: 2024-09-29

## 2024-09-29 RX ORDER — ONDANSETRON 2 MG/ML
4 INJECTION INTRAMUSCULAR; INTRAVENOUS EVERY 6 HOURS PRN
Status: DISCONTINUED | OUTPATIENT
Start: 2024-09-29 | End: 2024-09-30 | Stop reason: HOSPADM

## 2024-09-29 RX ORDER — SODIUM CHLORIDE 0.9 % (FLUSH) 0.9 %
10 SYRINGE (ML) INJECTION AS NEEDED
Status: DISCONTINUED | OUTPATIENT
Start: 2024-09-29 | End: 2024-09-30 | Stop reason: HOSPADM

## 2024-09-29 RX ORDER — MORPHINE SULFATE 2 MG/ML
2 INJECTION, SOLUTION INTRAMUSCULAR; INTRAVENOUS EVERY 4 HOURS PRN
Status: DISCONTINUED | OUTPATIENT
Start: 2024-09-29 | End: 2024-09-30 | Stop reason: HOSPADM

## 2024-09-29 RX ADMIN — MORPHINE SULFATE 2 MG: 2 INJECTION, SOLUTION INTRAMUSCULAR; INTRAVENOUS at 16:43

## 2024-09-29 RX ADMIN — PROCHLORPERAZINE EDISYLATE 10 MG: 5 INJECTION INTRAMUSCULAR; INTRAVENOUS at 12:47

## 2024-09-29 RX ADMIN — Medication 10 ML: at 20:58

## 2024-09-29 RX ADMIN — AZTREONAM 2 G: 2 INJECTION, POWDER, LYOPHILIZED, FOR SOLUTION INTRAMUSCULAR; INTRAVENOUS at 14:04

## 2024-09-29 RX ADMIN — ONDANSETRON 4 MG: 2 INJECTION INTRAMUSCULAR; INTRAVENOUS at 10:52

## 2024-09-29 RX ADMIN — SODIUM CHLORIDE 1000 ML: 9 INJECTION, SOLUTION INTRAVENOUS at 09:46

## 2024-09-29 RX ADMIN — PANTOPRAZOLE SODIUM 40 MG: 40 TABLET, DELAYED RELEASE ORAL at 17:35

## 2024-09-29 RX ADMIN — MORPHINE SULFATE 2 MG: 2 INJECTION, SOLUTION INTRAMUSCULAR; INTRAVENOUS at 20:58

## 2024-09-29 RX ADMIN — IOPAMIDOL 70 ML: 612 INJECTION, SOLUTION INTRAVENOUS at 10:40

## 2024-09-29 RX ADMIN — PROCHLORPERAZINE EDISYLATE 10 MG: 5 INJECTION INTRAMUSCULAR; INTRAVENOUS at 09:46

## 2024-09-29 RX ADMIN — HYDROMORPHONE HYDROCHLORIDE 1 MG: 1 INJECTION, SOLUTION INTRAMUSCULAR; INTRAVENOUS; SUBCUTANEOUS at 12:46

## 2024-09-29 RX ADMIN — ONDANSETRON 4 MG: 2 INJECTION INTRAMUSCULAR; INTRAVENOUS at 12:47

## 2024-09-29 RX ADMIN — TIZANIDINE 4 MG: 4 TABLET ORAL at 20:58

## 2024-09-29 RX ADMIN — HYDROMORPHONE HYDROCHLORIDE 1 MG: 1 INJECTION, SOLUTION INTRAMUSCULAR; INTRAVENOUS; SUBCUTANEOUS at 09:47

## 2024-09-29 RX ADMIN — LORAZEPAM 1 MG: 1 TABLET ORAL at 18:08

## 2024-09-29 RX ADMIN — HYDROMORPHONE HYDROCHLORIDE 1 MG: 1 INJECTION, SOLUTION INTRAMUSCULAR; INTRAVENOUS; SUBCUTANEOUS at 10:52

## 2024-09-29 NOTE — PLAN OF CARE
Goal Outcome Evaluation:  Patient admitted to  this shift. Patient A&Ox4. Patient is currently on room air. Patient will be NPO at midnight. Patient has tolerated all interventions. No complaints or concerns at this time. No signs of acute distress noted. Will continue to follow plan of care.

## 2024-09-29 NOTE — ED PROVIDER NOTES
Subjective   History of Present Illness  54-year-old female presents secondary to 3-day history of nausea vomiting along with right upper quadrant abdominal pain.  Patient denies any fever.  She denies any burning with urination or frequency urgency.  She denies any chest pain pressure tightness or squeezing.  She denies any other complaints at this time.  She states that she has not had any previous similar episode other than when she had a colon infection several years ago.  She denies any diarrhea.  She voices no other complaints.  She has a past medical history of bipolar disorder, hypertension, asthma, fibromyalgia, acid reflux.  She presents by ambulance.      Review of Systems   Constitutional: Negative.  Negative for fever.   HENT: Negative.     Respiratory: Negative.     Cardiovascular: Negative.  Negative for chest pain.   Gastrointestinal:  Positive for abdominal pain, nausea and vomiting. Negative for diarrhea.   Endocrine: Negative.    Genitourinary: Negative.  Negative for dysuria, flank pain and urgency.   Skin: Negative.    Neurological: Negative.    Psychiatric/Behavioral: Negative.     All other systems reviewed and are negative.      Past Medical History:   Diagnosis Date    Anxiety     Asthma     childhood asthma    Bipolar disorder     Depression     Fibromyalgia     GERD (gastroesophageal reflux disease)     Hypertension        Allergies   Allergen Reactions    Lyrica [Pregabalin]     Cymbalta [Duloxetine Hcl]     Erythromycin     Lithium     Toradol [Ketorolac Tromethamine]     Tramadol     Keflex [Cephalexin] Rash    Penicillins Rash     Pt received Cefepime and ceftriaxone before         Past Surgical History:   Procedure Laterality Date    BREAST BIOPSY Left 1997    benign    CARDIAC CATHETERIZATION N/A 07/03/2020    Procedure: Left Heart Cath;  Surgeon: Ludmila Murray MD;  Location: [facility] CATH INVASIVE LOCATION;  Service: Cardiovascular;  Laterality: N/A;    COLONOSCOPY N/A 7/6/2022     Procedure: COLONOSCOPY FOR SCREENING;  Surgeon: Staci Samaniego MD;  Location:  [facility];  Service: Gastroenterology;  Laterality: N/A;    ENDOSCOPY N/A 7/6/2022    Procedure: ESOPHAGOGASTRODUODENOSCOPY WITH BIOPSY;  Surgeon: Staci Smaaniego MD;  Location:  [facility];  Service: Gastroenterology;  Laterality: N/A;    HYSTERECTOMY      30    UPPER GASTROINTESTINAL ENDOSCOPY         Family History   Problem Relation Age of Onset    Heart disease Mother     Anxiety disorder Mother     Depression Mother     Throat cancer Father     Breast cancer Neg Hx        Social History     Socioeconomic History    Marital status:    Tobacco Use    Smoking status: Never    Smokeless tobacco: Never   Vaping Use    Vaping status: Never Used   Substance and Sexual Activity    Alcohol use: No    Drug use: No    Sexual activity: Defer           Objective   Physical Exam  Vitals and nursing note reviewed.   Constitutional:       General: She is not in acute distress.     Appearance: She is well-developed. She is not diaphoretic.   HENT:      Head: Normocephalic and atraumatic.      Right Ear: External ear normal.      Left Ear: External ear normal.      Nose: Nose normal.   Eyes:      Conjunctiva/sclera: Conjunctivae normal.      Pupils: Pupils are equal, round, and reactive to light.   Neck:      Vascular: No JVD.      Trachea: No tracheal deviation.   Cardiovascular:      Rate and Rhythm: Normal rate and regular rhythm.      Heart sounds: Normal heart sounds. No murmur heard.  Pulmonary:      Effort: Pulmonary effort is normal. No respiratory distress.      Breath sounds: Normal breath sounds. No wheezing.   Abdominal:      General: Bowel sounds are normal.      Palpations: Abdomen is soft.      Tenderness: There is abdominal tenderness (RUQ pain).   Musculoskeletal:         General: No deformity. Normal range of motion.      Cervical back: Normal range of motion and neck supple.   Skin:     General: Skin is  warm and dry.      Coloration: Skin is not pale.      Findings: No erythema or rash.   Neurological:      Mental Status: She is alert and oriented to person, place, and time.      Cranial Nerves: No cranial nerve deficit.   Psychiatric:         Behavior: Behavior normal.         Thought Content: Thought content normal.         Procedures           ED Course  ED Course as of 09/29/24 1440   Sun Sep 29, 2024   0950 ECG 12 Lead Other; abd pain  Vent. Rate :  56 BPM     Atrial Rate :  56 BPM     P-R Int : 152 ms          QRS Dur :  94 ms      QT Int : 488 ms       P-R-T Axes :  38  80  62 degrees     QTc Int : 470 ms     Sinus bradycardia  Biatrial enlargement  Abnormal ECG  When compared with ECG of 19-AUG-2022 07:04,  Vent. rate has decreased BY  52 BPM  ST no longer depressed in Inferior leads  ST no longer depressed in Lateral leads  T wave inversion no longer evident in Inferior leads  T wave amplitude has increased in Lateral leads   [ES]   1015 Patient continues to have nausea and vomiting.  Medications written. [JI]   1304 Reviewed with Dr. Adler.  Patient to be admitted to his service.  He recommends antibiotics. [JI]      ED Course User Index  [ES] Patel Macdonald MD  [JI] Eldon Suazo PA                                             Medical Decision Making  54-year-old female presents secondary to 3-day history of nausea vomiting along with right upper quadrant abdominal pain.  Patient denies any fever.  She denies any burning with urination or frequency urgency.  She denies any chest pain pressure tightness or squeezing.  She denies any other complaints at this time.  She states that she has not had any previous similar episode other than when she had a colon infection several years ago.  She denies any diarrhea.  She voices no other complaints.  She has a past medical history of bipolar disorder, hypertension, asthma, fibromyalgia, acid reflux.  She presents by ambulance.    Amount and/or  Complexity of Data Reviewed  Labs: ordered. Decision-making details documented in ED Course.  Radiology: ordered. Decision-making details documented in ED Course.  ECG/medicine tests: ordered. Decision-making details documented in ED Course.    Risk  Prescription drug management.  Decision regarding hospitalization.        Final diagnoses:   Right upper quadrant abdominal pain   Intractable vomiting       ED Disposition  ED Disposition       ED Disposition   Decision to Admit    Condition   --    Comment   Level of Care: Med/Surg [1]   Diagnosis: Abdominal pain [068315]                 No follow-up provider specified.       Medication List      No changes were made to your prescriptions during this visit.            Eldon Suazo PA  09/29/24 1442

## 2024-09-30 VITALS
HEART RATE: 84 BPM | SYSTOLIC BLOOD PRESSURE: 183 MMHG | RESPIRATION RATE: 20 BRPM | BODY MASS INDEX: 35.43 KG/M2 | DIASTOLIC BLOOD PRESSURE: 86 MMHG | TEMPERATURE: 98.6 F | OXYGEN SATURATION: 98 % | WEIGHT: 192.5 LBS | HEIGHT: 62 IN

## 2024-09-30 PROCEDURE — 99234 HOSP IP/OBS SM DT SF/LOW 45: CPT | Performed by: SURGERY

## 2024-09-30 PROCEDURE — G0378 HOSPITAL OBSERVATION PER HR: HCPCS

## 2024-09-30 PROCEDURE — 25010000002 MORPHINE PER 10 MG: Performed by: SURGERY

## 2024-09-30 PROCEDURE — 96376 TX/PRO/DX INJ SAME DRUG ADON: CPT

## 2024-09-30 PROCEDURE — 25010000002 ONDANSETRON PER 1 MG: Performed by: SURGERY

## 2024-09-30 RX ORDER — HYDROCODONE BITARTRATE AND ACETAMINOPHEN 5; 325 MG/1; MG/1
1 TABLET ORAL EVERY 6 HOURS PRN
Status: DISCONTINUED | OUTPATIENT
Start: 2024-09-30 | End: 2024-09-30 | Stop reason: HOSPADM

## 2024-09-30 RX ORDER — ONDANSETRON 4 MG/1
4 TABLET, ORALLY DISINTEGRATING ORAL EVERY 8 HOURS PRN
Qty: 20 TABLET | Refills: 0 | Status: SHIPPED | OUTPATIENT
Start: 2024-09-30

## 2024-09-30 RX ADMIN — AMLODIPINE BESYLATE 10 MG: 10 TABLET ORAL at 09:25

## 2024-09-30 RX ADMIN — PANTOPRAZOLE SODIUM 40 MG: 40 TABLET, DELAYED RELEASE ORAL at 06:51

## 2024-09-30 RX ADMIN — MORPHINE SULFATE 2 MG: 2 INJECTION, SOLUTION INTRAMUSCULAR; INTRAVENOUS at 09:32

## 2024-09-30 RX ADMIN — MORPHINE SULFATE 2 MG: 2 INJECTION, SOLUTION INTRAMUSCULAR; INTRAVENOUS at 04:16

## 2024-09-30 RX ADMIN — ONDANSETRON 4 MG: 2 INJECTION INTRAMUSCULAR; INTRAVENOUS at 04:20

## 2024-09-30 RX ADMIN — HYDROCODONE BITARTRATE AND ACETAMINOPHEN 1 TABLET: 5; 325 TABLET ORAL at 11:07

## 2024-09-30 NOTE — PLAN OF CARE
Goal Outcome Evaluation:  Patient is currently resting in bed. A&Ox4. VSS.  Patient complaint of pain and frequently requesting pain medications. Patient hypotensive this shift. No visible s/s of acute distress noted at this time. No requests or complaints at this time. Plan of care ongoing.

## 2024-09-30 NOTE — H&P
[facility]   HISTORY AND PHYSICAL    Patient Name: Rani Martins  : 1970  MRN: [id number]  Primary Care Physician:  Eldon Christian PA  Date of admission: 2024    Subjective   Subjective     Chief Complaint: Nausea and vomiting    History of Present Illness  54-year-old female presenting with acute onset severe nausea and vomiting.  She is passing flatus and having bowel function.  She has right sided abdominal pain that is episodic.  CT scan shows possible gallbladder wall thickening but no focal right upper quadrant tenderness is noted and no stones are evident.  Today she is feeling much better with antiemetic therapy.  No current abdominal pain.  The patient is positive for THC and her home medications which include pain medication and benzodiazepines for history of bipolar disorder and fibromyalgia.  Nausea  Associated symptoms include nausea and vomiting. Pertinent negatives include no abdominal pain, chest pain, chills, coughing, fever, headaches, joint swelling, rash, sore throat or weakness.   Vomiting   Pertinent negatives include no abdominal pain, chest pain, chills, coughing, diarrhea, dizziness, fever or headaches.       Review of Systems   Constitutional:  Negative for activity change, appetite change, chills and fever.   HENT:  Negative for sore throat and trouble swallowing.    Eyes:  Negative for visual disturbance.   Respiratory:  Negative for cough and shortness of breath.    Cardiovascular:  Negative for chest pain and palpitations.   Gastrointestinal:  Positive for nausea and vomiting. Negative for abdominal distention, abdominal pain, blood in stool, constipation and diarrhea.   Endocrine: Negative for cold intolerance and heat intolerance.   Genitourinary:  Negative for dysuria.   Musculoskeletal:  Negative for joint swelling.   Skin:  Negative for color change, rash and wound.   Allergic/Immunologic: Negative for immunocompromised state.   Neurological:  Negative for  dizziness, seizures, weakness and headaches.   Hematological:  Negative for adenopathy. Does not bruise/bleed easily.   Psychiatric/Behavioral:  Negative for agitation and confusion.         Personal History     Past Medical History:   Diagnosis Date    Anxiety     Asthma     childhood asthma    Bipolar disorder     Depression     Fibromyalgia     GERD (gastroesophageal reflux disease)     Hypertension        Past Surgical History:   Procedure Laterality Date    BREAST BIOPSY Left 1997    benign    CARDIAC CATHETERIZATION N/A 07/03/2020    Procedure: Left Heart Cath;  Surgeon: Ludmila Murray MD;  Location: [facility] CATH INVASIVE LOCATION;  Service: Cardiovascular;  Laterality: N/A;    COLONOSCOPY N/A 7/6/2022    Procedure: COLONOSCOPY FOR SCREENING;  Surgeon: Staci Samaniego MD;  Location: [facility] OR;  Service: Gastroenterology;  Laterality: N/A;    ENDOSCOPY N/A 7/6/2022    Procedure: ESOPHAGOGASTRODUODENOSCOPY WITH BIOPSY;  Surgeon: Staci Samaniego MD;  Location: [facility] OR;  Service: Gastroenterology;  Laterality: N/A;    HYSTERECTOMY      28    UPPER GASTROINTESTINAL ENDOSCOPY         Family History: family history includes Anxiety disorder in her mother; Depression in her mother; Heart disease in her mother; Throat cancer in her father. Otherwise pertinent FHx was reviewed and not pertinent to current issue.    Social History:  reports that she has never smoked. She has never used smokeless tobacco. She reports that she does not drink alcohol and does not use drugs.    Home Medications:  amLODIPine, pantoprazole, promethazine, and tiZANidine    Allergies:  Allergies   Allergen Reactions    Lyrica [Pregabalin]     Cymbalta [Duloxetine Hcl]     Erythromycin     Lithium     Toradol [Ketorolac Tromethamine]     Tramadol     Keflex [Cephalexin] Rash    Penicillins Rash     Pt received Cefepime and ceftriaxone before         Objective    Objective     Vitals:   Temp:  [98.1 °F (36.7 °C)-98.8 °F  (37.1 °C)] 98.3 °F (36.8 °C)  Heart Rate:  [57-89] 72  Resp:  [17-18] 18  BP: ()/(49-97) 172/80    Physical Exam  Constitutional:       Appearance: She is well-developed.   HENT:      Head: Normocephalic and atraumatic.   Eyes:      Conjunctiva/sclera: Conjunctivae normal.      Pupils: Pupils are equal, round, and reactive to light.   Neck:      Thyroid: No thyromegaly.      Vascular: No JVD.      Trachea: No tracheal deviation.   Cardiovascular:      Rate and Rhythm: Normal rate and regular rhythm.      Heart sounds: No murmur heard.     No friction rub. No gallop.   Pulmonary:      Effort: Pulmonary effort is normal.      Breath sounds: Normal breath sounds.   Abdominal:      General: There is no distension.      Palpations: Abdomen is soft. There is no hepatomegaly or splenomegaly.      Tenderness: There is no abdominal tenderness.      Hernia: No hernia is present.   Musculoskeletal:         General: No deformity. Normal range of motion.      Cervical back: Neck supple.   Skin:     General: Skin is warm and dry.   Neurological:      Mental Status: She is alert and oriented to person, place, and time.         Result Review    Result Review:  I have personally reviewed the results from the time of this admission to 9/30/2024 07:40 EDT and agree with these findings:  [x]  Laboratory list / accordion  []  Microbiology  [x]  Radiology  []  EKG/Telemetry   []  Cardiology/Vascular   []  Pathology  []  Old records  []  Other:        Assessment & Plan   Assessment / Plan     Brief Patient Summary:  Rani Martins is a 54 y.o. female who presents with acute onset nausea vomiting and diarrhea.  I suspect gastroenteritis.  Biliary dyskinesia cannot be excluded due to some right sided abdominal pain but no acute cholecystitis is evident.    Active Hospital Problems:  Active Hospital Problems    Diagnosis     **Abdominal pain      Plan:   Clear liquid diet, advance as tolerated  Discharge home with antiemetic therapy  and bowel regimen  Outpatient HIDA scan and follow-up for possible elective cholecystectomy if indicated.    VTE Prophylaxis:  No VTE prophylaxis order currently exists.        CODE STATUS:       Admission Status:  I believe this patient meets observation status.    Anderson Adler MD

## 2024-10-01 NOTE — DISCHARGE SUMMARY
Date of Discharge:  9/30/2024    Discharge Diagnosis: Abdominal pain, nausea and vomiting    Presenting Problem/History of Present Illness  Active Hospital Problems    Diagnosis  POA    **Abdominal pain [R10.9]  Yes      Resolved Hospital Problems   No resolved problems to display.          Hospital Course  Patient is a 54 y.o. female presented with abdominal pain and nausea and vomiting.  The patient imaging shows fluid-filled small bowel and constipation.  She is passing flatus since admission and underwent fluid hydration and observation.  Her pain improved and she was discharged home with antiemetic therapy.      Procedures Performed         Consults:   Consults       No orders found from 8/31/2024 to 9/30/2024.            Discharge Disposition  Home or Self Care    Discharge Medications     Discharge Medications        New Medications        Instructions Start Date   ondansetron ODT 4 MG disintegrating tablet  Commonly known as: ZOFRAN-ODT   Dissolve 1 tablet on tongue Every 8 (Eight) Hours As Needed for Nausea or Vomiting.             Continue These Medications        Instructions Start Date   amLODIPine 10 MG tablet  Commonly known as: NORVASC   10 mg, Oral, Daily      pantoprazole 40 MG EC tablet  Commonly known as: PROTONIX   40 mg, Oral, Daily      promethazine 25 MG tablet  Commonly known as: PHENERGAN   25 mg, Oral, 2 Times Daily PRN      tiZANidine 2 MG tablet  Commonly known as: ZANAFLEX   4 mg, Oral, Nightly               Discharge Diet:   Diet Instructions       Diet: Regular/House Diet; Thin (IDDSI 0)      Discharge Diet: Regular/House Diet    Fluid Consistency: Thin (IDDSI 0)            Activity at Discharge:   Activity Instructions    Advance activity as tolerated            Follow-up Appointments  Future Appointments   Date Time Provider Department Center   10/9/2024  2:40 PM Anderson Adler MD [facility]     Additional Instructions for the Follow-ups that You Need to  Schedule       Discharge Follow-up with Specialty: 1 Week   As directed      Follow Up: 1 Week        NM HIDA Scan With Pharmacological Intervention   Oct 05, 2024      Exam reason: RUQ pain, biliary dyskinesia   Release to patient: Routine Release                Test Results Pending at Discharge  Pending Labs       Order Current Status    Blood Culture - Blood, Arm, Left Preliminary result    Blood Culture - Blood, Arm, Right Preliminary result             Anderson Adler MD  10/01/24  09:08 EDT    Time: Discharge 15 min

## 2024-10-04 LAB
BACTERIA SPEC AEROBE CULT: NORMAL
BACTERIA SPEC AEROBE CULT: NORMAL

## 2024-10-13 ENCOUNTER — HOSPITAL ENCOUNTER (OUTPATIENT)
Dept: NUCLEAR MEDICINE | Facility: HOSPITAL | Age: 54
Discharge: HOME | End: 2024-10-13
Payer: COMMERCIAL

## 2024-10-13 DIAGNOSIS — R11.10 INTRACTABLE VOMITING: ICD-10-CM

## 2024-10-13 PROCEDURE — 0 TECHNETIUM TC 99M MEBROFENIN KIT: Performed by: SURGERY

## 2024-10-13 PROCEDURE — 78227 HEPATOBIL SYST IMAGE W/DRUG: CPT | Performed by: RADIOLOGY

## 2024-10-13 PROCEDURE — 78227 HEPATOBIL SYST IMAGE W/DRUG: CPT

## 2024-10-13 PROCEDURE — A9537 TC99M MEBROFENIN: HCPCS | Performed by: SURGERY

## 2024-10-13 PROCEDURE — 25010000002 SINCALIDE PER 5 MCG: Performed by: SURGERY

## 2024-10-13 RX ORDER — SINCALIDE 5 UG/5ML
0.04 INJECTION, POWDER, LYOPHILIZED, FOR SOLUTION INTRAVENOUS
Status: COMPLETED | OUTPATIENT
Start: 2024-10-13 | End: 2024-10-13

## 2024-10-13 RX ORDER — KIT FOR THE PREPARATION OF TECHNETIUM TC 99M MEBROFENIN 45 MG/10ML
1 INJECTION, POWDER, LYOPHILIZED, FOR SOLUTION INTRAVENOUS
Status: COMPLETED | OUTPATIENT
Start: 2024-10-13 | End: 2024-10-13

## 2024-10-13 RX ADMIN — SINCALIDE 3.1 MCG: 5 INJECTION, POWDER, LYOPHILIZED, FOR SOLUTION INTRAVENOUS at 12:05

## 2024-10-13 RX ADMIN — MEBROFENIN 1 DOSE: 45 INJECTION, POWDER, LYOPHILIZED, FOR SOLUTION INTRAVENOUS at 11:15

## 2024-10-16 ENCOUNTER — OFFICE VISIT (OUTPATIENT)
Dept: SURGERY | Facility: CLINIC | Age: 54
End: 2024-10-16
Payer: COMMERCIAL

## 2024-10-16 VITALS
WEIGHT: 160.4 LBS | DIASTOLIC BLOOD PRESSURE: 88 MMHG | BODY MASS INDEX: 29.52 KG/M2 | SYSTOLIC BLOOD PRESSURE: 146 MMHG | HEIGHT: 62 IN

## 2024-10-16 DIAGNOSIS — K82.8 BILIARY DYSKINESIA: Primary | ICD-10-CM

## 2024-10-16 PROCEDURE — 1159F MED LIST DOCD IN RCRD: CPT | Performed by: SURGERY

## 2024-10-16 PROCEDURE — 3079F DIAST BP 80-89 MM HG: CPT | Performed by: SURGERY

## 2024-10-16 PROCEDURE — 99243 OFF/OP CNSLTJ NEW/EST LOW 30: CPT | Performed by: SURGERY

## 2024-10-16 PROCEDURE — 3077F SYST BP >= 140 MM HG: CPT | Performed by: SURGERY

## 2024-10-16 PROCEDURE — 1160F RVW MEDS BY RX/DR IN RCRD: CPT | Performed by: SURGERY

## 2024-10-16 RX ORDER — ACETAMINOPHEN 325 MG/1
650 TABLET ORAL ONCE
OUTPATIENT
Start: 2024-10-16 | End: 2024-10-16

## 2024-10-16 RX ORDER — ONDANSETRON 4 MG/1
4 TABLET, ORALLY DISINTEGRATING ORAL EVERY 8 HOURS PRN
Qty: 20 TABLET | Refills: 0 | Status: SHIPPED | OUTPATIENT
Start: 2024-10-16

## 2024-10-16 RX ORDER — SODIUM CHLORIDE 0.9 % (FLUSH) 0.9 %
10 SYRINGE (ML) INJECTION AS NEEDED
OUTPATIENT
Start: 2024-10-16

## 2024-10-16 RX ORDER — INDOCYANINE GREEN AND WATER 25 MG
2.5 KIT INJECTION ONCE
OUTPATIENT
Start: 2024-10-16 | End: 2024-10-16

## 2024-10-16 RX ORDER — SODIUM CHLORIDE 0.9 % (FLUSH) 0.9 %
3 SYRINGE (ML) INJECTION EVERY 12 HOURS SCHEDULED
OUTPATIENT
Start: 2024-10-16

## 2024-10-16 NOTE — H&P (VIEW-ONLY)
Subjective   Rani Martins is a 54 y.o. female is being seen for consultation today at the request of Eldon Christian PA    Rani Martins is a 54 y.o. female with right upper quadrant abdominal pain.  Pain is intermittent and crampy in nature.  HIDA scan shows ejection fraction of less than 5%.  Symptom reproduction with HIDA scan.  No gallbladder wall thickening or stones on imaging.  Patient is afebrile.    History of Present Illness      Past Medical History:   Diagnosis Date    Anxiety     Asthma     childhood asthma    Bipolar disorder     Depression     Fibromyalgia     GERD (gastroesophageal reflux disease)     Hypertension        Family History   Problem Relation Age of Onset    Heart disease Mother     Anxiety disorder Mother     Depression Mother     Throat cancer Father     Breast cancer Neg Hx        Social History     Socioeconomic History    Marital status:    Tobacco Use    Smoking status: Never    Smokeless tobacco: Never   Vaping Use    Vaping status: Never Used   Substance and Sexual Activity    Alcohol use: No    Drug use: No    Sexual activity: Defer       Past Surgical History:   Procedure Laterality Date    BREAST BIOPSY Left 1997    benign    CARDIAC CATHETERIZATION N/A 07/03/2020    Procedure: Left Heart Cath;  Surgeon: Ludmila Murray MD;  Location: [facility] CATH INVASIVE LOCATION;  Service: Cardiovascular;  Laterality: N/A;    COLONOSCOPY N/A 7/6/2022    Procedure: COLONOSCOPY FOR SCREENING;  Surgeon: Staci Samaniego MD;  Location: [facility] OR;  Service: Gastroenterology;  Laterality: N/A;    ENDOSCOPY N/A 7/6/2022    Procedure: ESOPHAGOGASTRODUODENOSCOPY WITH BIOPSY;  Surgeon: Staci Samaniego MD;  Location: [facility] OR;  Service: Gastroenterology;  Laterality: N/A;    HYSTERECTOMY      28    UPPER GASTROINTESTINAL ENDOSCOPY         Review of Systems   Constitutional:  Negative for activity change, appetite change, chills and fever.   HENT:  Negative for sore  "throat and trouble swallowing.    Eyes:  Negative for visual disturbance.   Respiratory:  Negative for cough and shortness of breath.    Cardiovascular:  Negative for chest pain and palpitations.   Gastrointestinal:  Negative for abdominal distention, abdominal pain, blood in stool, constipation, diarrhea, nausea and vomiting.   Endocrine: Negative for cold intolerance and heat intolerance.   Genitourinary:  Negative for dysuria.   Musculoskeletal:  Negative for joint swelling.   Skin:  Negative for color change, rash and wound.   Allergic/Immunologic: Negative for immunocompromised state.   Neurological:  Negative for dizziness, seizures, weakness and headaches.   Hematological:  Negative for adenopathy. Does not bruise/bleed easily.   Psychiatric/Behavioral:  Negative for agitation and confusion.        Results        /88   Ht 157.5 cm (62\")   Wt 72.8 kg (160 lb 6.4 oz)   BMI 29.34 kg/m²   Objective   Physical Exam  Constitutional:       Appearance: She is well-developed.   HENT:      Head: Normocephalic and atraumatic.   Eyes:      Conjunctiva/sclera: Conjunctivae normal.      Pupils: Pupils are equal, round, and reactive to light.   Neck:      Thyroid: No thyromegaly.      Vascular: No JVD.      Trachea: No tracheal deviation.   Cardiovascular:      Rate and Rhythm: Normal rate and regular rhythm.      Heart sounds: No murmur heard.     No friction rub. No gallop.   Pulmonary:      Effort: Pulmonary effort is normal.      Breath sounds: Normal breath sounds.   Abdominal:      General: There is no distension.      Palpations: Abdomen is soft. There is no hepatomegaly or splenomegaly.      Tenderness: There is no abdominal tenderness in the right upper quadrant.      Hernia: No hernia is present.   Musculoskeletal:         General: No deformity. Normal range of motion.      Cervical back: Neck supple.   Skin:     General: Skin is warm and dry.   Neurological:      Mental Status: She is alert and oriented " to person, place, and time.       Physical Exam      Assessment & Plan            Assessment   Diagnoses and all orders for this visit:    1. Biliary dyskinesia (Primary)  -     Case Request; Standing  -     sodium chloride 0.9 % flush 3 mL  -     sodium chloride 0.9 % flush 10 mL  -     acetaminophen (TYLENOL) tablet 650 mg  -     ceFAZolin 2000 mg IVPB in 100 mL NS (VTB)  -     indocyanine green (IC-GREEN) injection 2.5 mg  -     Case Request    Other orders  -     ondansetron ODT (ZOFRAN-ODT) 4 MG disintegrating tablet; Dissolve 1 tablet on tongue Every 8 (Eight) Hours As Needed for Nausea or Vomiting.  Dispense: 20 tablet; Refill: 0  -     Follow Anesthesia Guidelines / Protocol; Future  -     Follow Anesthesia Guidelines / Protocol; Standing  -     Verify / Perform Chlorhexidine Skin Prep; Standing  -     Provide NPO Instructions to Patient; Future  -     Chlorhexidine Skin Prep; Future  -     Insert Peripheral IV; Standing  -     Saline Lock & Maintain IV Access; Standing  -     Place Sequential Compression Device; Standing  -     Maintain Sequential Compression Device; Standing        Assessment & Plan      Rani Martins is a 54 y.o. female with right upper quadrant abdominal pain and findings of biliary dyskinesia on HIDA scan.  Risks and benefits of been discussed with the patient and she will undergo cholecystectomy.                 This document has been electronically signed by Anderson Adler MD   October 16, 2024 12:37 EDT

## 2024-10-16 NOTE — PROGRESS NOTES
Patient Seen in: [facility] Emergency Department      History   Patient presents with:  Arrythmia/Palpitations    Stated Complaint: svt    HPI/Subjective:   HPI    57-year-old female complaint of palpitations.   Patient states that she had Covid a fe Subjective   Rani Martins is a 54 y.o. female is being seen for consultation today at the request of Eldon Christian PA    Rani Martins is a 54 y.o. female with right upper quadrant abdominal pain.  Pain is intermittent and crampy in nature.  HIDA scan shows ejection fraction of less than 5%.  Symptom reproduction with HIDA scan.  No gallbladder wall thickening or stones on imaging.  Patient is afebrile.    History of Present Illness      Past Medical History:   Diagnosis Date    Anxiety     Asthma     childhood asthma    Bipolar disorder     Depression     Fibromyalgia     GERD (gastroesophageal reflux disease)     Hypertension        Family History   Problem Relation Age of Onset    Heart disease Mother     Anxiety disorder Mother     Depression Mother     Throat cancer Father     Breast cancer Neg Hx        Social History     Socioeconomic History    Marital status:    Tobacco Use    Smoking status: Never    Smokeless tobacco: Never   Vaping Use    Vaping status: Never Used   Substance and Sexual Activity    Alcohol use: No    Drug use: No    Sexual activity: Defer       Past Surgical History:   Procedure Laterality Date    BREAST BIOPSY Left 1997    benign    CARDIAC CATHETERIZATION N/A 07/03/2020    Procedure: Left Heart Cath;  Surgeon: Ludmila Murray MD;  Location: [facility] CATH INVASIVE LOCATION;  Service: Cardiovascular;  Laterality: N/A;    COLONOSCOPY N/A 7/6/2022    Procedure: COLONOSCOPY FOR SCREENING;  Surgeon: Staci Samaniego MD;  Location: [facility] OR;  Service: Gastroenterology;  Laterality: N/A;    ENDOSCOPY N/A 7/6/2022    Procedure: ESOPHAGOGASTRODUODENOSCOPY WITH BIOPSY;  Surgeon: Staci Samaniego MD;  Location: [facility] OR;  Service: Gastroenterology;  Laterality: N/A;    HYSTERECTOMY      28    UPPER GASTROINTESTINAL ENDOSCOPY         Review of Systems   Constitutional:  Negative for activity change, appetite change, chills and fever.   HENT:  Negative for sore  "throat and trouble swallowing.    Eyes:  Negative for visual disturbance.   Respiratory:  Negative for cough and shortness of breath.    Cardiovascular:  Negative for chest pain and palpitations.   Gastrointestinal:  Negative for abdominal distention, abdominal pain, blood in stool, constipation, diarrhea, nausea and vomiting.   Endocrine: Negative for cold intolerance and heat intolerance.   Genitourinary:  Negative for dysuria.   Musculoskeletal:  Negative for joint swelling.   Skin:  Negative for color change, rash and wound.   Allergic/Immunologic: Negative for immunocompromised state.   Neurological:  Negative for dizziness, seizures, weakness and headaches.   Hematological:  Negative for adenopathy. Does not bruise/bleed easily.   Psychiatric/Behavioral:  Negative for agitation and confusion.        Results        /88   Ht 157.5 cm (62\")   Wt 72.8 kg (160 lb 6.4 oz)   BMI 29.34 kg/m²   Objective   Physical Exam  Constitutional:       Appearance: She is well-developed.   HENT:      Head: Normocephalic and atraumatic.   Eyes:      Conjunctiva/sclera: Conjunctivae normal.      Pupils: Pupils are equal, round, and reactive to light.   Neck:      Thyroid: No thyromegaly.      Vascular: No JVD.      Trachea: No tracheal deviation.   Cardiovascular:      Rate and Rhythm: Normal rate and regular rhythm.      Heart sounds: No murmur heard.     No friction rub. No gallop.   Pulmonary:      Effort: Pulmonary effort is normal.      Breath sounds: Normal breath sounds.   Abdominal:      General: There is no distension.      Palpations: Abdomen is soft. There is no hepatomegaly or splenomegaly.      Tenderness: There is no abdominal tenderness in the right upper quadrant.      Hernia: No hernia is present.   Musculoskeletal:         General: No deformity. Normal range of motion.      Cervical back: Neck supple.   Skin:     General: Skin is warm and dry.   Neurological:      Mental Status: She is alert and oriented " above.    Physical Exam     ED Triage Vitals [05/04/21 1858]   BP (!) 147/121   Pulse (!) 136   Resp 20   Temp    Temp src    SpO2 100 %   O2 Device None (Room air)       Current:/83   Pulse 57   Resp 23   Ht 172.7 cm (5' 8\")   Wt 60.8 kg   LMP 04/2 done approximate 2 minutes after the first 1 after she converted shows a normal sinus rhythm ventricular rate 64 the rate axis interval reviewed this is normal EKG         MDM      Copy of the EKG from the office showed a rate of 144 with a regular rhythm to person, place, and time.       Physical Exam      Assessment & Plan            Assessment   Diagnoses and all orders for this visit:    1. Biliary dyskinesia (Primary)  -     Case Request; Standing  -     sodium chloride 0.9 % flush 3 mL  -     sodium chloride 0.9 % flush 10 mL  -     acetaminophen (TYLENOL) tablet 650 mg  -     ceFAZolin 2000 mg IVPB in 100 mL NS (VTB)  -     indocyanine green (IC-GREEN) injection 2.5 mg  -     Case Request    Other orders  -     ondansetron ODT (ZOFRAN-ODT) 4 MG disintegrating tablet; Dissolve 1 tablet on tongue Every 8 (Eight) Hours As Needed for Nausea or Vomiting.  Dispense: 20 tablet; Refill: 0  -     Follow Anesthesia Guidelines / Protocol; Future  -     Follow Anesthesia Guidelines / Protocol; Standing  -     Verify / Perform Chlorhexidine Skin Prep; Standing  -     Provide NPO Instructions to Patient; Future  -     Chlorhexidine Skin Prep; Future  -     Insert Peripheral IV; Standing  -     Saline Lock & Maintain IV Access; Standing  -     Place Sequential Compression Device; Standing  -     Maintain Sequential Compression Device; Standing        Assessment & Plan      Rani Martins is a 54 y.o. female with right upper quadrant abdominal pain and findings of biliary dyskinesia on HIDA scan.  Risks and benefits of been discussed with the patient and she will undergo cholecystectomy.                 This document has been electronically signed by Anderson Adler MD   October 16, 2024 12:37 EDT

## 2024-10-28 NOTE — DISCHARGE INSTRUCTIONS
10/31/24  ARRIVAL TIME PER  OFFICE  0630    TAKE the following medications the morning of surgery:  All heart or blood pressure medications    HOLD all diabetic medications the morning of surgery as ordered by physician.    Please discontinue all blood thinners and anticoagulants (except aspirin) prior to surgery as per your surgeon and cardiologist instructions.  Aspirin may be continued up to the day prior to surgery.     CHLORHEXIDINE CLOTHS GIVEN WITH INSTRUCTIONS AND FORM TO RETURN TO HOSPITAL, IF APPLICABLE.    General Instructions:  Do not eat or drink after midnight: includes water, mints, or gum. You may brush your teeth.  Dental appliances that are removable must be taken out day of surgery.  Do not smoke, chew tobacco, or drink alcohol.  Bring medications in original bottles, any inhalers and if applicable your C-PAP/BI-PAP machine.  Bring any papers given to you in the doctor's office.  Wear clean comfortable clothes and socks.  Do not wear contact lenses or make-up. Bring a case for your glasses if applicable.  Bring crutches or walker if applicable.  Leave all other valuables and jewelry at home.    If you were given a blood bank ID arm band remember to bring it with you the day of surgery.    Preventing a Surgical Site Infection:  Shower the night before surgery (unless instructed other wise) using a fresh bar of anti-bacterial soap (such as Dial) and clean washcloth. Dry with a clean towel and dress in clean clothing.  For 2 to 3 days before surgery, avoid shaving with a razor near where you will have surgery because the razor can irritate skin and make it easier to develop an infection. Ask your surgeon if you will be receiving antibiotics prior to surgery.  Make sure you, your family, and all healthcare providers clear their hands with soap and water or an alcohol-based hand  before caring for you or your wound.  If at all possible, quit smoking as many days before surgery as you  can.    Day of surgery:  Upon arrival, a Pre-op nurse and Anesthesiologist will review your health history, obtain vital signs, and answer questions you may have. The only belongings needed at this time will be your home medications and if applicable your C-PAP/BI-PAP machine. If you are staying overnight your family can leave the rest of your belongings in the car and bring them to your room later. A Pre-op nurse will start an IV and you may receive medication in preparation for surgery, including something to help you relax. Your family will be able to see you in the Pre-op area. While you are in surgery your family should notify the waiting room  if they leave the waiting room area and provide a contact phone number.    Please be aware that surgery does come with discomfort. We want to make every effort to control your discomfort so please discuss any uncontrolled symptoms with your nurse. Your doctor will most likely have prescribed pain medications.    If you are going home after surgery you will receive individualized written care instructions before being discharged. A responsible adult must drive you to and from the hospital on the day of surgery and stay with you for 24 hours.    If you are staying overnight following surgery, you will be transported to your hospital room following the recovery period.     If you have any questions please call Pre-Admission Testing at [phone] ext [phone] Monday-Friday 8:00-3:00  Deductibles and co-payments are collected on the day of service. Please be prepared to pay the required co-pay, deductible or deposit on the day of service as defined by your plan.    A RESPONSIBLE PERSON MUST REMAIN IN THE WAITING ROOM DURING YOUR PROCEDURE AND A RESPONSIBLE  MUST BE AVAILABLE UPON YOUR DISCHARGE.

## 2024-10-29 ENCOUNTER — PRE-ADMISSION TESTING (OUTPATIENT)
Dept: PREADMISSION TESTING | Facility: HOSPITAL | Age: 54
End: 2024-10-29
Payer: COMMERCIAL

## 2024-10-29 LAB
ANION GAP SERPL CALCULATED.3IONS-SCNC: 13.2 MMOL/L (ref 5–15)
BUN SERPL-MCNC: 10 MG/DL (ref 6–20)
BUN/CREAT SERPL: 18.2 (ref 7–25)
CALCIUM SPEC-SCNC: 9.5 MG/DL (ref 8.6–10.5)
CHLORIDE SERPL-SCNC: 108 MMOL/L (ref 98–107)
CO2 SERPL-SCNC: 20.8 MMOL/L (ref 22–29)
CREAT SERPL-MCNC: 0.55 MG/DL (ref 0.57–1)
DEPRECATED RDW RBC AUTO: 45.6 FL (ref 37–54)
EGFRCR SERPLBLD CKD-EPI 2021: 110.4 ML/MIN/1.73
ERYTHROCYTE [DISTWIDTH] IN BLOOD BY AUTOMATED COUNT: 13.4 % (ref 12.3–15.4)
GLUCOSE SERPL-MCNC: 90 MG/DL (ref 65–99)
HCT VFR BLD AUTO: 43.9 % (ref 34–46.6)
HGB BLD-MCNC: 14.1 G/DL (ref 12–15.9)
MCH RBC QN AUTO: 29.4 PG (ref 26.6–33)
MCHC RBC AUTO-ENTMCNC: 32.1 G/DL (ref 31.5–35.7)
MCV RBC AUTO: 91.6 FL (ref 79–97)
PLATELET # BLD AUTO: 309 10*3/MM3 (ref 140–450)
PMV BLD AUTO: 11.7 FL (ref 6–12)
POTASSIUM SERPL-SCNC: 3.7 MMOL/L (ref 3.5–5.2)
RBC # BLD AUTO: 4.79 10*6/MM3 (ref 3.77–5.28)
SODIUM SERPL-SCNC: 142 MMOL/L (ref 136–145)
WBC NRBC COR # BLD AUTO: 6.61 10*3/MM3 (ref 3.4–10.8)

## 2024-10-29 PROCEDURE — 85027 COMPLETE CBC AUTOMATED: CPT

## 2024-10-29 PROCEDURE — 80048 BASIC METABOLIC PNL TOTAL CA: CPT

## 2024-10-29 PROCEDURE — 36415 COLL VENOUS BLD VENIPUNCTURE: CPT

## 2024-10-31 ENCOUNTER — HOSPITAL ENCOUNTER (OUTPATIENT)
Facility: HOSPITAL | Age: 54
Setting detail: HOSPITAL OUTPATIENT SURGERY
Discharge: HOME | End: 2024-10-31
Attending: SURGERY | Admitting: SURGERY
Payer: COMMERCIAL

## 2024-10-31 ENCOUNTER — ANESTHESIA EVENT (OUTPATIENT)
Dept: PERIOP | Facility: HOSPITAL | Age: 54
End: 2024-10-31
Payer: COMMERCIAL

## 2024-10-31 ENCOUNTER — APPOINTMENT (OUTPATIENT)
Dept: GENERAL RADIOLOGY | Facility: HOSPITAL | Age: 54
End: 2024-10-31
Payer: COMMERCIAL

## 2024-10-31 ENCOUNTER — ANESTHESIA (OUTPATIENT)
Dept: PERIOP | Facility: HOSPITAL | Age: 54
End: 2024-10-31
Payer: COMMERCIAL

## 2024-10-31 VITALS
TEMPERATURE: 98.1 F | OXYGEN SATURATION: 96 % | HEIGHT: 62 IN | HEART RATE: 78 BPM | BODY MASS INDEX: 29.19 KG/M2 | RESPIRATION RATE: 18 BRPM | DIASTOLIC BLOOD PRESSURE: 65 MMHG | WEIGHT: 158.6 LBS | SYSTOLIC BLOOD PRESSURE: 141 MMHG

## 2024-10-31 DIAGNOSIS — K82.8 BILIARY DYSKINESIA: ICD-10-CM

## 2024-10-31 PROCEDURE — 25010000002 DROPERIDOL PER 5 MG: Performed by: ANESTHESIOLOGY

## 2024-10-31 PROCEDURE — 25010000002 MIDAZOLAM PER 1 MG: Performed by: ANESTHESIOLOGY

## 2024-10-31 PROCEDURE — 25010000002 ROPIVACAINE PER 1 MG: Performed by: ANESTHESIOLOGY

## 2024-10-31 PROCEDURE — S2900 ROBOTIC SURGICAL SYSTEM: HCPCS | Performed by: SURGERY

## 2024-10-31 PROCEDURE — 47563 LAPARO CHOLECYSTECTOMY/GRAPH: CPT | Performed by: SURGERY

## 2024-10-31 PROCEDURE — 25010000002 FENTANYL CITRATE (PF) 50 MCG/ML SOLUTION: Performed by: NURSE ANESTHETIST, CERTIFIED REGISTERED

## 2024-10-31 PROCEDURE — 25010000002 GLYCOPYRROLATE 0.4 MG/2ML SOLUTION: Performed by: NURSE ANESTHETIST, CERTIFIED REGISTERED

## 2024-10-31 PROCEDURE — 25010000002 LIDOCAINE PF 2% 2 % SOLUTION: Performed by: NURSE ANESTHETIST, CERTIFIED REGISTERED

## 2024-10-31 PROCEDURE — 25010000002 PROPOFOL 200 MG/20ML EMULSION: Performed by: NURSE ANESTHETIST, CERTIFIED REGISTERED

## 2024-10-31 PROCEDURE — 25010000002 INDOCYANINE GREEN 25 MG RECONSTITUTED SOLUTION: Performed by: SURGERY

## 2024-10-31 PROCEDURE — 25010000002 DEXAMETHASONE PER 1 MG: Performed by: NURSE ANESTHETIST, CERTIFIED REGISTERED

## 2024-10-31 PROCEDURE — 25010000002 CEFAZOLIN PER 500 MG: Performed by: SURGERY

## 2024-10-31 PROCEDURE — 25010000002 NEOSTIGMINE 10 MG/10ML SOLUTION: Performed by: NURSE ANESTHETIST, CERTIFIED REGISTERED

## 2024-10-31 PROCEDURE — 25010000002 MIDAZOLAM PER 1 MG: Performed by: NURSE ANESTHETIST, CERTIFIED REGISTERED

## 2024-10-31 PROCEDURE — 25010000002 ONDANSETRON PER 1 MG: Performed by: NURSE ANESTHETIST, CERTIFIED REGISTERED

## 2024-10-31 DEVICE — HEMOLOK L 6 CLIPS/CART
Type: IMPLANTABLE DEVICE | Site: ABDOMEN | Status: FUNCTIONAL
Brand: WECK

## 2024-10-31 RX ORDER — SODIUM CHLORIDE 0.9 % (FLUSH) 0.9 %
10 SYRINGE (ML) INJECTION EVERY 12 HOURS SCHEDULED
Status: DISCONTINUED | OUTPATIENT
Start: 2024-10-31 | End: 2024-10-31 | Stop reason: HOSPADM

## 2024-10-31 RX ORDER — ONDANSETRON 2 MG/ML
4 INJECTION INTRAMUSCULAR; INTRAVENOUS AS NEEDED
Status: DISCONTINUED | OUTPATIENT
Start: 2024-10-31 | End: 2024-10-31 | Stop reason: HOSPADM

## 2024-10-31 RX ORDER — MIDAZOLAM HYDROCHLORIDE 1 MG/ML
INJECTION, SOLUTION INTRAMUSCULAR; INTRAVENOUS AS NEEDED
Status: DISCONTINUED | OUTPATIENT
Start: 2024-10-31 | End: 2024-10-31 | Stop reason: SURG

## 2024-10-31 RX ORDER — FENTANYL CITRATE 50 UG/ML
INJECTION, SOLUTION INTRAMUSCULAR; INTRAVENOUS AS NEEDED
Status: DISCONTINUED | OUTPATIENT
Start: 2024-10-31 | End: 2024-10-31 | Stop reason: SURG

## 2024-10-31 RX ORDER — IPRATROPIUM BROMIDE AND ALBUTEROL SULFATE 2.5; .5 MG/3ML; MG/3ML
3 SOLUTION RESPIRATORY (INHALATION) ONCE AS NEEDED
Status: DISCONTINUED | OUTPATIENT
Start: 2024-10-31 | End: 2024-10-31 | Stop reason: HOSPADM

## 2024-10-31 RX ORDER — DEXAMETHASONE SODIUM PHOSPHATE 4 MG/ML
INJECTION, SOLUTION INTRA-ARTICULAR; INTRALESIONAL; INTRAMUSCULAR; INTRAVENOUS; SOFT TISSUE
Status: COMPLETED | OUTPATIENT
Start: 2024-10-31 | End: 2024-10-31

## 2024-10-31 RX ORDER — NEOSTIGMINE METHYLSULFATE 1 MG/ML
INJECTION INTRAVENOUS AS NEEDED
Status: DISCONTINUED | OUTPATIENT
Start: 2024-10-31 | End: 2024-10-31 | Stop reason: SURG

## 2024-10-31 RX ORDER — INDOCYANINE GREEN AND WATER 25 MG
2.5 KIT INJECTION ONCE
Status: COMPLETED | OUTPATIENT
Start: 2024-10-31 | End: 2024-10-31

## 2024-10-31 RX ORDER — SODIUM CHLORIDE 0.9 % (FLUSH) 0.9 %
10 SYRINGE (ML) INJECTION AS NEEDED
Status: DISCONTINUED | OUTPATIENT
Start: 2024-10-31 | End: 2024-10-31 | Stop reason: HOSPADM

## 2024-10-31 RX ORDER — GLYCOPYRROLATE 0.2 MG/ML
INJECTION INTRAMUSCULAR; INTRAVENOUS AS NEEDED
Status: DISCONTINUED | OUTPATIENT
Start: 2024-10-31 | End: 2024-10-31 | Stop reason: SURG

## 2024-10-31 RX ORDER — SODIUM CHLORIDE 0.9 % (FLUSH) 0.9 %
3 SYRINGE (ML) INJECTION EVERY 12 HOURS SCHEDULED
Status: DISCONTINUED | OUTPATIENT
Start: 2024-10-31 | End: 2024-10-31 | Stop reason: HOSPADM

## 2024-10-31 RX ORDER — FENTANYL CITRATE 50 UG/ML
50 INJECTION, SOLUTION INTRAMUSCULAR; INTRAVENOUS
Status: DISCONTINUED | OUTPATIENT
Start: 2024-10-31 | End: 2024-10-31 | Stop reason: HOSPADM

## 2024-10-31 RX ORDER — OXYCODONE AND ACETAMINOPHEN 5; 325 MG/1; MG/1
1 TABLET ORAL ONCE AS NEEDED
Status: COMPLETED | OUTPATIENT
Start: 2024-10-31 | End: 2024-10-31

## 2024-10-31 RX ORDER — ONDANSETRON 2 MG/ML
INJECTION INTRAMUSCULAR; INTRAVENOUS AS NEEDED
Status: DISCONTINUED | OUTPATIENT
Start: 2024-10-31 | End: 2024-10-31 | Stop reason: SURG

## 2024-10-31 RX ORDER — DEXAMETHASONE SODIUM PHOSPHATE 4 MG/ML
INJECTION, SOLUTION INTRA-ARTICULAR; INTRALESIONAL; INTRAMUSCULAR; INTRAVENOUS; SOFT TISSUE AS NEEDED
Status: DISCONTINUED | OUTPATIENT
Start: 2024-10-31 | End: 2024-10-31 | Stop reason: SURG

## 2024-10-31 RX ORDER — TRAMADOL HYDROCHLORIDE 50 MG/1
50 TABLET ORAL EVERY 8 HOURS PRN
Qty: 12 TABLET | Refills: 0 | Status: SHIPPED | OUTPATIENT
Start: 2024-10-31

## 2024-10-31 RX ORDER — ACETAMINOPHEN 325 MG/1
650 TABLET ORAL EVERY 4 HOURS PRN
Qty: 30 TABLET | Refills: 0 | Status: SHIPPED | OUTPATIENT
Start: 2024-10-31

## 2024-10-31 RX ORDER — ROPIVACAINE HYDROCHLORIDE 5 MG/ML
INJECTION, SOLUTION EPIDURAL; INFILTRATION; PERINEURAL
Status: COMPLETED | OUTPATIENT
Start: 2024-10-31 | End: 2024-10-31

## 2024-10-31 RX ORDER — CISATRACURIUM BESYLATE 2 MG/ML
INJECTION, SOLUTION INTRAVENOUS AS NEEDED
Status: DISCONTINUED | OUTPATIENT
Start: 2024-10-31 | End: 2024-10-31 | Stop reason: SURG

## 2024-10-31 RX ORDER — FAMOTIDINE 10 MG/ML
INJECTION, SOLUTION INTRAVENOUS AS NEEDED
Status: DISCONTINUED | OUTPATIENT
Start: 2024-10-31 | End: 2024-10-31 | Stop reason: SURG

## 2024-10-31 RX ORDER — PROPOFOL 10 MG/ML
INJECTION, EMULSION INTRAVENOUS AS NEEDED
Status: DISCONTINUED | OUTPATIENT
Start: 2024-10-31 | End: 2024-10-31 | Stop reason: SURG

## 2024-10-31 RX ORDER — MIDAZOLAM HYDROCHLORIDE 1 MG/ML
1 INJECTION, SOLUTION INTRAMUSCULAR; INTRAVENOUS
Status: COMPLETED | OUTPATIENT
Start: 2024-10-31 | End: 2024-10-31

## 2024-10-31 RX ORDER — LIDOCAINE HYDROCHLORIDE 20 MG/ML
INJECTION, SOLUTION EPIDURAL; INFILTRATION; INTRACAUDAL; PERINEURAL AS NEEDED
Status: DISCONTINUED | OUTPATIENT
Start: 2024-10-31 | End: 2024-10-31 | Stop reason: SURG

## 2024-10-31 RX ORDER — DROPERIDOL 2.5 MG/ML
0.62 INJECTION, SOLUTION INTRAMUSCULAR; INTRAVENOUS ONCE
Status: COMPLETED | OUTPATIENT
Start: 2024-10-31 | End: 2024-10-31

## 2024-10-31 RX ORDER — MEPERIDINE HYDROCHLORIDE 25 MG/ML
12.5 INJECTION INTRAMUSCULAR; INTRAVENOUS; SUBCUTANEOUS
Status: DISCONTINUED | OUTPATIENT
Start: 2024-10-31 | End: 2024-10-31 | Stop reason: HOSPADM

## 2024-10-31 RX ORDER — ACETAMINOPHEN 325 MG/1
650 TABLET ORAL ONCE
Status: COMPLETED | OUTPATIENT
Start: 2024-10-31 | End: 2024-10-31

## 2024-10-31 RX ADMIN — CISATRACURIUM BESYLATE 10 MG: 20 INJECTION INTRAVENOUS at 07:29

## 2024-10-31 RX ADMIN — DROPERIDOL 0.62 MG: 2.5 INJECTION, SOLUTION INTRAMUSCULAR; INTRAVENOUS at 08:44

## 2024-10-31 RX ADMIN — Medication 2.5 MG: at 07:20

## 2024-10-31 RX ADMIN — ROPIVACAINE HYDROCHLORIDE 210 MG: 5 INJECTION, SOLUTION EPIDURAL; INFILTRATION; PERINEURAL at 07:48

## 2024-10-31 RX ADMIN — DEXAMETHASONE SODIUM PHOSPHATE 20 MG: 4 INJECTION, SOLUTION INTRA-ARTICULAR; INTRALESIONAL; INTRAMUSCULAR; INTRAVENOUS; SOFT TISSUE at 07:45

## 2024-10-31 RX ADMIN — DEXAMETHASONE SODIUM PHOSPHATE 8 MG: 4 INJECTION, SOLUTION INTRA-ARTICULAR; INTRALESIONAL; INTRAMUSCULAR; INTRAVENOUS; SOFT TISSUE at 07:48

## 2024-10-31 RX ADMIN — FAMOTIDINE 20 MG: 10 INJECTION, SOLUTION INTRAVENOUS at 07:27

## 2024-10-31 RX ADMIN — CEFAZOLIN 2000 MG: 2 INJECTION, POWDER, FOR SOLUTION INTRAMUSCULAR; INTRAVENOUS at 07:27

## 2024-10-31 RX ADMIN — FENTANYL CITRATE 100 MCG: 50 INJECTION INTRAMUSCULAR; INTRAVENOUS at 07:27

## 2024-10-31 RX ADMIN — ACETAMINOPHEN 650 MG: 325 TABLET ORAL at 07:03

## 2024-10-31 RX ADMIN — LIDOCAINE HYDROCHLORIDE 100 MG: 20 INJECTION, SOLUTION EPIDURAL; INFILTRATION; INTRACAUDAL; PERINEURAL at 07:29

## 2024-10-31 RX ADMIN — MIDAZOLAM HYDROCHLORIDE 2 MG: 1 INJECTION, SOLUTION INTRAMUSCULAR; INTRAVENOUS at 07:27

## 2024-10-31 RX ADMIN — OXYCODONE HYDROCHLORIDE AND ACETAMINOPHEN 1 TABLET: 5; 325 TABLET ORAL at 09:06

## 2024-10-31 RX ADMIN — ONDANSETRON 4 MG: 2 INJECTION INTRAMUSCULAR; INTRAVENOUS at 07:27

## 2024-10-31 RX ADMIN — PROPOFOL 200 MG: 10 INJECTION, EMULSION INTRAVENOUS at 07:29

## 2024-10-31 RX ADMIN — DESOGESTREL AND ETHINYL ESTRADIOL 5 MG: KIT at 08:17

## 2024-10-31 RX ADMIN — FENTANYL CITRATE 50 MCG: 50 INJECTION, SOLUTION INTRAMUSCULAR; INTRAVENOUS at 08:58

## 2024-10-31 RX ADMIN — FENTANYL CITRATE 50 MCG: 50 INJECTION, SOLUTION INTRAMUSCULAR; INTRAVENOUS at 08:53

## 2024-10-31 RX ADMIN — MIDAZOLAM HYDROCHLORIDE 1 MG: 1 INJECTION, SOLUTION INTRAMUSCULAR; INTRAVENOUS at 07:20

## 2024-10-31 RX ADMIN — GLYCOPYRROLATE 0.8 MG: 0.4 INJECTION INTRAMUSCULAR; INTRAVENOUS at 08:17

## 2024-10-31 RX ADMIN — MIDAZOLAM HYDROCHLORIDE 1 MG: 1 INJECTION, SOLUTION INTRAMUSCULAR; INTRAVENOUS at 07:26

## 2024-10-31 RX ADMIN — Medication 10 ML: at 07:21

## 2024-10-31 NOTE — ANESTHESIA PROCEDURE NOTES
Airway  Urgency: elective    Date/Time: 10/31/2024 7:33 AM  Airway not difficult    General Information and Staff    Patient location during procedure: OR  CRNA/CAA: Willem Huerta CRNA    Indications and Patient Condition    Preoxygenated: yes  MILS not maintained throughout  Mask difficulty assessment: 0 - not attempted    Final Airway Details  Final airway type: supraglottic airway      Successful airway: unique  Size 4     Number of attempts at approach: 1  Assessment: lips, teeth, and gum same as pre-op and atraumatic intubation    Additional Comments  Atraumatic LMA placement, dentition unchanged.

## 2024-10-31 NOTE — ANESTHESIA PREPROCEDURE EVALUATION
" Anesthesia Evaluation     Patient summary reviewed and Nursing notes reviewed   no history of anesthetic complications:   NPO Solid Status: > 8 hours  NPO Liquid Status: > 8 hours           Airway   Mallampati: I  TM distance: >3 FB  Neck ROM: full  No difficulty expected  Dental - normal exam   (+) poor dentition    Pulmonary - normal exam    breath sounds clear to auscultation  (+) asthma,  Cardiovascular - normal exam  Exercise tolerance: good (4-7 METS)    NYHA Classification: II  ECG reviewed  Rhythm: regular  Rate: normal    (+) hypertension, past MI (NSTEMI & 7/2020. cath showed normal coronaries. PATIENT WAS \"SEPTIC\")       Neuro/Psych  (+) psychiatric history Anxiety  GI/Hepatic/Renal/Endo    (+) GERD, liver disease (biliary diskenesia)    Musculoskeletal (-) negative ROS    Abdominal  - normal exam    Abdomen: soft.   Substance History - negative use     OB/GYN negative ob/gyn ROS         Other - negative ROS       ROS/Med Hx Other: 8/26/21  ·   Left ventricular ejection fraction appears to be 66 - 70%. Left ventricular systolic function is normal.  · Left ventricular diastolic function was normal.  · Estimated right ventricular systolic pressure from tricuspid regurgitation is mildly elevated (35-45 mmHg).                     Anesthesia Plan    ASA 2     general with block     intravenous induction     Anesthetic plan, risks, benefits, and alternatives have been provided, discussed and informed consent has been obtained with: patient.  Pre-procedure education provided  Use of blood products discussed with  Consented to blood products.    Plan discussed with CRNA.      CODE STATUS:       "

## 2024-10-31 NOTE — ANESTHESIA PROCEDURE NOTES
Airway  Urgency: elective    Date/Time: 10/31/2024 7:33 AM  Difficult airway    General Information and Staff    Patient location during procedure: OR  CRNA/CAA: Willem Huerta CRNA    Indications and Patient Condition  Indications for airway management: airway protection    Preoxygenated: yes  MILS not maintained throughout  Mask difficulty assessment: 1 - vent by mask    Final Airway Details  Final airway type: endotracheal airway      Successful airway: ETT  Cuffed: yes   Successful intubation technique: direct laryngoscopy, video laryngoscopy and flexible bronchoscopy  Facilitating devices/methods: Bougie, intubating stylet, cricoid pressure and anterior pressure/BURP  Endotracheal tube insertion site: oral  Blade: Glidescope  Blade size: 3  ETT size (mm): 7.5  Cormack-Lehane Classification: grade IIb - view of arytenoids or posterior of glottis only  Placement verified by: chest auscultation and capnometry   Measured from: lips  ETT/EBT  to lips (cm): 22  Number of attempts at approach: 1  Assessment: lips, teeth, and gum same as pre-op and atraumatic intubation    Additional Comments    First attempt with Mil #3 - GIII view, very anterior with limited oral opening.  Second attempt with Dang #3 - GIII view, unable to place blindly with stylet, bougie same result.  Hand bagged, LMA placed waiting for [place] scope  [place] scope MAC #3 - GII view, extremely anterior, three attempts with stylet finally successful, with cricoid pressure, max extension and stylet reshaped.  Posterior oralpharyngeal area with small amount of blood visible.  Dentition unchanged.

## 2024-10-31 NOTE — ANESTHESIA PROCEDURE NOTES
"Peripheral Block      Patient reassessed immediately prior to procedure    Patient location during procedure: OR  Start time: 10/31/2024 7:45 AM  Stop time: 10/31/2024 7:48 AM  Reason for block: at surgeon's request and post-op pain management  Performed by  CRNA/CAA: Caitlin Navarrete CRNA  Preanesthetic Checklist  Completed: patient identified, IV checked, site marked, risks and benefits discussed, surgical consent, monitors and equipment checked, pre-op evaluation and timeout performed  Prep:  Pt Position: supine  Sterile barriers:cap, gloves, sterile barriers and mask  Prep: ChloraPrep  Patient monitoring: blood pressure monitoring, continuous pulse oximetry and EKG  Procedure    Nursing cardiac assessment comments yes: Sedation, GA, Spinal,Epidural   Performed under: general  Guidance:ultrasound guided    ULTRASOUND INTERPRETATION.  Using ultrasound guidance a 20 G (20g 4\" Stimuplex) gauge needle was placed in close proximity to the nerve, at which point, under ultrasound guidance anesthetic was injected in the area of the nerve and spread of the anesthesia was seen on ultrasound in close proximity thereto.  There were no abnormalities seen on ultrasound; a digital image was taken; and the patient tolerated the procedure with no complications. Images:still images obtained    Laterality:Bilateral  Block Type:TAP  Injection Technique:single-shot  Needle Type:short-bevel  Needle Gauge:20 G  Resistance on Injection: none    Medications Used: ropivacaine (NAROPIN) injection 0.5 % - Peripheral Nerve   210 mg - 10/31/2024 7:48:00 AM  dexamethasone (DECADRON) injection - Injection   8 mg - 10/31/2024 7:48:00 AM      Medications  Preservative Free Saline:8ml  Comment:Block Injection:  Total volume divided equally between Right and Left block        Post Assessment  Injection Assessment: negative aspiration for heme, incremental injection and no paresthesia on injection  Patient Tolerance:comfortable throughout " block  Complications:no  Additional Notes  The pt was in the supine position under general anesthesia.    Under Ultrasound guidance, a BBraun 4inch 360 degree needle was advanced with Normal Saline hydro dissection of tissue.  The Internal Oblique and Transversus Abdominus muscles where visualized.  At or before the aponeurosis of Internal Oblique, local anesthetic spread was visualized in the Transversus Abdominus Plane. Injection was made incrementally with aspiration every 5 mls.  There was no  intravascular injection,  injection pressure was normal, there was no neural injection, and the procedure was completed without difficulty. The same procedure was completed for left and right sided tap blocks. Thank You.    Performed by: Willem Huerta CRNA

## 2024-10-31 NOTE — ANESTHESIA POSTPROCEDURE EVALUATION
Patient: Rani Martins    Procedure Summary       Date: 10/31/24 Room / Location: [facility] OR  /  [facility] OR    Anesthesia Start: 0727 Anesthesia Stop: 0825    Procedure: CHOLECYSTECTOMY LAPAROSCOPIC WITH DAVINCI ROBOT (Abdomen) Diagnosis:       Biliary dyskinesia      (Biliary dyskinesia [K82.8])    Surgeons: Anderson Adler MD Provider: Ricardo Lu MD    Anesthesia Type: general with block ASA Status: 2            Anesthesia Type: general with block    Vitals  Vitals Value Taken Time   /62 10/31/24 0856   Temp 98.3 °F (36.8 °C) 10/31/24 0826   Pulse 73 10/31/24 0856   Resp 18 10/31/24 0856   SpO2 98 % 10/31/24 0856           Post Anesthesia Care and Evaluation    Patient location during evaluation: PACU  Patient participation: complete - patient participated  Level of consciousness: awake  Pain score: 0    Airway patency: patent  Anesthetic complications: No anesthetic complications  PONV Status: none  Cardiovascular status: hemodynamically stable  Respiratory status: nasal cannula  Hydration status: acceptable

## 2024-10-31 NOTE — OP NOTE
CHOLECYSTECTOMY LAPAROSCOPIC WITH DAVINCI ROBOT  Procedure Note    Rani Martins  10/31/2024    Pre-op Diagnosis:   Biliary dyskinesia [K82.8]    Post-op Diagnosis:     Post-Op Diagnosis Codes:     * Biliary dyskinesia [K82.8]    Procedure(s):  CHOLECYSTECTOMY LAPAROSCOPIC WITH DAVINCI ROBOT    Surgeon(s):  Anderson Adler MD    Anesthesia: General    Staff:   Circulator: Tosha Fermin RN  Scrub Person: Will Atkins  Vendor Representative: Mackenzie Madrid  Assistant: Shweta Da Silva CSA    Findings: Distended gallbladder, critical view of safety obtained    Operative Procedure: The patient was taken to the operating suite and placed supine on operating table.  Bilateral sequential compression devices were applied and general endotracheal anesthesia administered.  The abdomen was prepped and draped in usual sterile fashion.  Preoperative antibiotics were confirmed.  Timeout procedure was performed.  A Veress needle was inserted at Pederson's point and saline drop test confirmed entry into the peritoneal space.  Pneumoperitoneum was established.  An 8 mm Optiview trocar was inserted through an infraumbilical incision.  The laparoscope was inserted and the Veress needle site was free of injury.  The Veress needle site was removed and upsized to an 8 mm robotic trocar.  A second 8 mm robotic trocar was placed in the anterior axillary line at the level of the umbilicus of the right abdomen.  A 5 mm Optiview trocar was then placed as far lateral as possible in the right abdomen for an assistant trocar.  At this time the robot was docked to the trocars and the robotic camera inserted.  The patient had been placed in reverse Trendelenburg with left lateral tilt prior to docking.  I then exited the sterile field and entered the robotic console.  My assistant placed a robotic hook in the right arm #1 and a fenestrated bipolar in the left arm #2.  My assistant grasped the fundus of the gallbladder and  retracted anteriorly and superiorly. The gallbladder was distended.  Mild adhesions to the fundus and infundibulum were taken down with cautery hook.  The infundibulum was exposed and grasped and retracted laterally and inferiorly.  The peritoneum on the anterior posterior surface of the gallbladder was opened with the cautery hook.  The gallbladder was elevated from the gallbladder fossa for a portion and the cystic duct and artery were identified and dissected free circumferentially.  All adventitial tissue between the cystic duct and artery was dissected free with the cautery hook.  The cystic plate was visible from the anterior and posterior views with only the cystic duct and artery seen entering the gallbladder.  With the critical view of safety obtained Firefly confirmed no abnormal ductal abnormality and at this time the cystic artery was controlled with a single Hemoclip placed proximally on the artery and the cystic duct controlled with 2 hemoclips placed on the cystic duct stump side and one on the infundibular side of the duct. The artery was divided with the cautery hook with no evidence of bleeding.  The cystic duct was divided with the cut mode of the cautery hook with no evidence of cystic duct stump leak.  The gallbladder was then removed from the gallbladder fossa intact.  This was done with the cautery hook.  Firefly was used to confirm no evidence of duct of Luschka or accessory duct leakage.  There was no cystic duct stump leakage.  At this time robotic instruments were removed under direct visualization.  The robot was undocked and I entered the sterile field for completion of the case.  A 5 mm laparoscope was inserted through a robotic trocar and the gallbladder was placed in a 5 mm Endo Catch bag. It was removed through the infraumbilical fascial defect.  The gallbladder fossa was hemostatic and at this time all trocars were removed under direct visualization and pneumoperitoneum was  evacuated.  The infraumbilical fascial defect was closed with a figure-of-eight Vicryl suture and all skin incisions closed with Monocryl subcuticular suture and dressed with Skin Affix.  The patient tolerated the procedure well.    Estimated Blood Loss: 10mL    Specimens:   Gallbladder           Drains: none    Grafts/Implants:  none    Complications: none      Anderson Adler MD     Date: 10/31/2024  Time: 08:27 EDT

## 2024-11-04 LAB — REF LAB TEST METHOD: NORMAL

## 2024-11-13 ENCOUNTER — OFFICE VISIT (OUTPATIENT)
Dept: SURGERY | Facility: CLINIC | Age: 54
End: 2024-11-13
Payer: COMMERCIAL

## 2024-11-13 VITALS — BODY MASS INDEX: 29.08 KG/M2 | WEIGHT: 158 LBS | HEIGHT: 62 IN

## 2024-11-13 DIAGNOSIS — K82.8 BILIARY DYSKINESIA: Primary | ICD-10-CM

## 2024-11-13 PROCEDURE — 1159F MED LIST DOCD IN RCRD: CPT | Performed by: SURGERY

## 2024-11-13 PROCEDURE — 99024 POSTOP FOLLOW-UP VISIT: CPT | Performed by: SURGERY

## 2024-11-13 PROCEDURE — 1160F RVW MEDS BY RX/DR IN RCRD: CPT | Performed by: SURGERY

## 2024-11-13 NOTE — PROGRESS NOTES
Subjective   Rani Martins is a 54 y.o. female  is here today for follow-up.         Rani Martins is a 54 y.o. female here for followup after cholecystectomy.  The patient is doing well and tolerating diet.  Their incisions are healing well.  No complaints reported.,  Nausea resolved    Pathology consistent with benign appearing gallbladder with cholesterolosis    Physical Exam:  NAD, AOx3  RRR  CTAB  S/appropriately tender/incisions healing well          Diagnoses and all orders for this visit:    1. Biliary dyskinesia (Primary)        Assessment     54 y.o. female s/p cholecystectomy secondary to nausea and vomiting likely due to biliary dyskinesia.  Symptoms have resolved after cholecystectomy.  Follow-up as needed.

## 2025-06-24 ENCOUNTER — APPOINTMENT (OUTPATIENT)
Dept: GENERAL RADIOLOGY | Facility: HOSPITAL | Age: 55
End: 2025-06-24
Payer: COMMERCIAL

## 2025-06-24 ENCOUNTER — HOSPITAL ENCOUNTER (EMERGENCY)
Facility: HOSPITAL | Age: 55
Discharge: HOME | End: 2025-06-25
Payer: COMMERCIAL

## 2025-06-24 VITALS
OXYGEN SATURATION: 92 % | DIASTOLIC BLOOD PRESSURE: 78 MMHG | SYSTOLIC BLOOD PRESSURE: 135 MMHG | BODY MASS INDEX: 27.6 KG/M2 | HEART RATE: 79 BPM | RESPIRATION RATE: 19 BRPM | WEIGHT: 150 LBS | HEIGHT: 62 IN | TEMPERATURE: 98.5 F

## 2025-06-24 DIAGNOSIS — I16.9 HYPERTENSIVE CRISIS: Primary | ICD-10-CM

## 2025-06-24 LAB
ALBUMIN SERPL-MCNC: 4.8 G/DL (ref 3.5–5.2)
ALBUMIN/GLOB SERPL: 1.5 G/DL
ALP SERPL-CCNC: 90 U/L (ref 39–117)
ALT SERPL W P-5'-P-CCNC: 22 U/L (ref 1–33)
ANION GAP SERPL CALCULATED.3IONS-SCNC: 17.2 MMOL/L (ref 5–15)
AST SERPL-CCNC: 20 U/L (ref 1–32)
BASOPHILS # BLD AUTO: 0.05 10*3/MM3 (ref 0–0.2)
BASOPHILS NFR BLD AUTO: 0.4 % (ref 0–1.5)
BILIRUB SERPL-MCNC: 0.5 MG/DL (ref 0–1.2)
BUN SERPL-MCNC: 7 MG/DL (ref 6–20)
BUN/CREAT SERPL: 10.3 (ref 7–25)
CALCIUM SPEC-SCNC: 9.5 MG/DL (ref 8.6–10.5)
CHLORIDE SERPL-SCNC: 107 MMOL/L (ref 98–107)
CO2 SERPL-SCNC: 19.8 MMOL/L (ref 22–29)
CREAT SERPL-MCNC: 0.68 MG/DL (ref 0.57–1)
D DIMER PPP FEU-MCNC: <0.27 MCGFEU/ML (ref 0–0.52)
D-LACTATE SERPL-SCNC: 2.9 MMOL/L (ref 0.5–2)
D-LACTATE SERPL-SCNC: 3.1 MMOL/L (ref 0.5–2)
DEPRECATED RDW RBC AUTO: 48.6 FL (ref 37–54)
EGFRCR SERPLBLD CKD-EPI 2021: 104.9 ML/MIN/1.73
EOSINOPHIL # BLD AUTO: 0.06 10*3/MM3 (ref 0–0.4)
EOSINOPHIL NFR BLD AUTO: 0.5 % (ref 0.3–6.2)
ERYTHROCYTE [DISTWIDTH] IN BLOOD BY AUTOMATED COUNT: 14.2 % (ref 12.3–15.4)
GEN 5 1HR TROPONIN T REFLEX: 15 NG/L
GLOBULIN UR ELPH-MCNC: 3.2 GM/DL
GLUCOSE SERPL-MCNC: 111 MG/DL (ref 65–99)
HCT VFR BLD AUTO: 46.4 % (ref 34–46.6)
HGB BLD-MCNC: 15 G/DL (ref 12–15.9)
HOLD SPECIMEN: NORMAL
IMM GRANULOCYTES # BLD AUTO: 0.12 10*3/MM3 (ref 0–0.05)
IMM GRANULOCYTES NFR BLD AUTO: 1 % (ref 0–0.5)
LIPASE SERPL-CCNC: 19 U/L (ref 13–60)
LYMPHOCYTES # BLD AUTO: 3.34 10*3/MM3 (ref 0.7–3.1)
LYMPHOCYTES NFR BLD AUTO: 27.8 % (ref 19.6–45.3)
MCH RBC QN AUTO: 30.5 PG (ref 26.6–33)
MCHC RBC AUTO-ENTMCNC: 32.3 G/DL (ref 31.5–35.7)
MCV RBC AUTO: 94.3 FL (ref 79–97)
MONOCYTES # BLD AUTO: 0.97 10*3/MM3 (ref 0.1–0.9)
MONOCYTES NFR BLD AUTO: 8.1 % (ref 5–12)
NEUTROPHILS NFR BLD AUTO: 62.2 % (ref 42.7–76)
NEUTROPHILS NFR BLD AUTO: 7.48 10*3/MM3 (ref 1.7–7)
NRBC BLD AUTO-RTO: 0 /100 WBC (ref 0–0.2)
PLATELET # BLD AUTO: 415 10*3/MM3 (ref 140–450)
PMV BLD AUTO: 10.8 FL (ref 6–12)
POTASSIUM SERPL-SCNC: 3.1 MMOL/L (ref 3.5–5.2)
PROT SERPL-MCNC: 8 G/DL (ref 6–8.5)
RBC # BLD AUTO: 4.92 10*6/MM3 (ref 3.77–5.28)
SODIUM SERPL-SCNC: 144 MMOL/L (ref 136–145)
TROPONIN T % DELTA: 7
TROPONIN T NUMERIC DELTA: 1 NG/L
TROPONIN T SERPL HS-MCNC: 14 NG/L
WBC NRBC COR # BLD AUTO: 12.02 10*3/MM3 (ref 3.4–10.8)

## 2025-06-24 PROCEDURE — 83605 ASSAY OF LACTIC ACID: CPT

## 2025-06-24 PROCEDURE — 99284 EMERGENCY DEPT VISIT MOD MDM: CPT

## 2025-06-24 PROCEDURE — 96374 THER/PROPH/DIAG INJ IV PUSH: CPT

## 2025-06-24 PROCEDURE — 96361 HYDRATE IV INFUSION ADD-ON: CPT

## 2025-06-24 PROCEDURE — 25810000003 SODIUM CHLORIDE 0.9 % SOLUTION

## 2025-06-24 PROCEDURE — 25010000002 LORAZEPAM PER 2 MG

## 2025-06-24 PROCEDURE — 71045 X-RAY EXAM CHEST 1 VIEW: CPT

## 2025-06-24 PROCEDURE — 93005 ELECTROCARDIOGRAM TRACING: CPT

## 2025-06-24 PROCEDURE — 83690 ASSAY OF LIPASE: CPT

## 2025-06-24 PROCEDURE — 63710000001 ONDANSETRON ODT 4 MG TABLET DISPERSIBLE

## 2025-06-24 PROCEDURE — 85379 FIBRIN DEGRADATION QUANT: CPT

## 2025-06-24 PROCEDURE — 84484 ASSAY OF TROPONIN QUANT: CPT

## 2025-06-24 PROCEDURE — 36415 COLL VENOUS BLD VENIPUNCTURE: CPT

## 2025-06-24 PROCEDURE — 85025 COMPLETE CBC W/AUTO DIFF WBC: CPT

## 2025-06-24 PROCEDURE — 71045 X-RAY EXAM CHEST 1 VIEW: CPT | Performed by: RADIOLOGY

## 2025-06-24 PROCEDURE — 93010 ELECTROCARDIOGRAM REPORT: CPT | Performed by: INTERNAL MEDICINE

## 2025-06-24 PROCEDURE — 80053 COMPREHEN METABOLIC PANEL: CPT

## 2025-06-24 RX ORDER — POTASSIUM CHLORIDE 1500 MG/1
20 TABLET, EXTENDED RELEASE ORAL ONCE
Status: COMPLETED | OUTPATIENT
Start: 2025-06-24 | End: 2025-06-25

## 2025-06-24 RX ORDER — TIZANIDINE HYDROCHLORIDE 4 MG/1
4 CAPSULE, GELATIN COATED ORAL 3 TIMES DAILY PRN
Qty: 30 CAPSULE | Refills: 0 | Status: SHIPPED | OUTPATIENT
Start: 2025-06-24

## 2025-06-24 RX ORDER — LORAZEPAM 2 MG/ML
1 INJECTION INTRAMUSCULAR ONCE
Status: COMPLETED | OUTPATIENT
Start: 2025-06-24 | End: 2025-06-24

## 2025-06-24 RX ORDER — HYDRALAZINE HYDROCHLORIDE 20 MG/ML
10 INJECTION INTRAMUSCULAR; INTRAVENOUS ONCE
Status: DISCONTINUED | OUTPATIENT
Start: 2025-06-24 | End: 2025-06-25 | Stop reason: HOSPADM

## 2025-06-24 RX ORDER — CLONIDINE HYDROCHLORIDE 0.1 MG/1
0.2 TABLET ORAL ONCE
Status: DISCONTINUED | OUTPATIENT
Start: 2025-06-24 | End: 2025-06-25 | Stop reason: HOSPADM

## 2025-06-24 RX ORDER — ONDANSETRON 4 MG/1
4 TABLET, ORALLY DISINTEGRATING ORAL ONCE
Status: COMPLETED | OUTPATIENT
Start: 2025-06-24 | End: 2025-06-24

## 2025-06-24 RX ADMIN — SODIUM CHLORIDE 1000 ML: 9 INJECTION, SOLUTION INTRAVENOUS at 19:34

## 2025-06-24 RX ADMIN — TIZANIDINE 4 MG: 4 TABLET ORAL at 20:56

## 2025-06-24 RX ADMIN — LORAZEPAM 1 MG: 2 INJECTION INTRAMUSCULAR; INTRAVENOUS at 19:35

## 2025-06-24 RX ADMIN — SODIUM CHLORIDE 1000 ML: 9 INJECTION, SOLUTION INTRAVENOUS at 20:28

## 2025-06-24 RX ADMIN — ONDANSETRON 4 MG: 4 TABLET, ORALLY DISINTEGRATING ORAL at 18:26

## 2025-06-24 NOTE — ED PROVIDER NOTES
Subjective   History of Present Illness  Patient was seen at the office today.  She was having blood pressure readings of 276/112.  She was given amlodipine.  Told to come to the ER.  In the ER she is now vomiting.  When she gets stressed she says she vomits.    She is not really complaining of chest pain.  She appears more anxious than anything else.    Will work to get her blood pressure down and get her to calm down and then get her out of crisis.  Will check a troponin.        Review of Systems   Cardiovascular:  Positive for chest pain.   Gastrointestinal:  Positive for nausea and vomiting.       Past Medical History:   Diagnosis Date    Anxiety     Arthritis     Colitis     Fibromyalgia     GERD (gastroesophageal reflux disease)     Hypertension     Nausea & vomiting     Pain     RUQ       Allergies   Allergen Reactions    Lyrica [Pregabalin]     Cymbalta [Duloxetine Hcl]     Erythromycin     Lithium     Toradol [Ketorolac Tromethamine]     Keflex [Cephalexin] Rash    Penicillins Rash     Pt received Cefepime and ceftriaxone before  Beta lactam allergy details  Antibiotic reaction: rash  Age at reaction: infant  Dose to reaction time: (!) hours  Reason for antibiotic: ear infection  Epinephrine required for reaction?: no           Past Surgical History:   Procedure Laterality Date    BREAST BIOPSY Left 1997    benign    CARDIAC CATHETERIZATION N/A 07/03/2020    Procedure: Left Heart Cath;  Surgeon: Ludmila Murray MD;  Location: [facility] CATH INVASIVE LOCATION;  Service: Cardiovascular;  Laterality: N/A;    CHOLECYSTECTOMY N/A 10/31/2024    Procedure: CHOLECYSTECTOMY LAPAROSCOPIC WITH DAVINCI ROBOT;  Surgeon: Anderson Adler MD;  Location: [facility] OR;  Service: Robotics - DaVinci;  Laterality: N/A;    COLONOSCOPY N/A 07/06/2022    Procedure: COLONOSCOPY FOR SCREENING;  Surgeon: Staci Saamniego MD;  Location: [facility] OR;  Service: Gastroenterology;  Laterality: N/A;    DIAGNOSTIC LAPAROSCOPY       REMOVED TUMORS FROM OVARIES    EAR TUBES      ENDOSCOPY N/A 07/06/2022    Procedure: ESOPHAGOGASTRODUODENOSCOPY WITH BIOPSY;  Surgeon: Staci Samaniego MD;  Location: [facility] OR;  Service: Gastroenterology;  Laterality: N/A;    HYSTERECTOMY      28    TONSILLECTOMY      UPPER GASTROINTESTINAL ENDOSCOPY         Family History   Problem Relation Age of Onset    Heart disease Mother     Anxiety disorder Mother     Depression Mother     Throat cancer Father     Breast cancer Neg Hx        Social History     Socioeconomic History    Marital status:    Tobacco Use    Smoking status: Never     Passive exposure: Never    Smokeless tobacco: Never   Vaping Use    Vaping status: Never Used   Substance and Sexual Activity    Alcohol use: No    Drug use: No    Sexual activity: Defer           Objective   Physical Exam  Constitutional:       Comments: Dry heaving   HENT:      Head: Normocephalic.      Right Ear: External ear normal.      Left Ear: External ear normal.      Mouth/Throat:      Mouth: Mucous membranes are moist.   Eyes:      Extraocular Movements: Extraocular movements intact.      Pupils: Pupils are equal, round, and reactive to light.   Cardiovascular:      Rate and Rhythm: Normal rate and regular rhythm.      Heart sounds: No murmur heard.  Pulmonary:      Effort: Pulmonary effort is normal.      Breath sounds: Normal breath sounds.   Abdominal:      General: Abdomen is flat. Bowel sounds are normal.      Palpations: Abdomen is soft.   Musculoskeletal:         General: No swelling. Normal range of motion.      Cervical back: Normal range of motion. No rigidity.   Skin:     General: Skin is warm and dry.      Capillary Refill: Capillary refill takes less than 2 seconds.   Neurological:      General: No focal deficit present.      Mental Status: She is alert.   Psychiatric:         Mood and Affect: Mood normal.         Procedures           ED Course  ED Course as of 06/25/25 0152   Tue Jun 24, 2025    2034 Rani Martins, EKG is done ventricular rate is 120 NV interval is 116 QRS is 90.  ST segment depression in the lateral chest leads inferior leads.  No ST segment elevation. [GP]      ED Course User Index  [GP] Byron Sam MD                                                       Medical Decision Making  54-year-old female with elevated blood pressure and probably needs an anxiety induced vomiting.    Will do a cardiac workup on her and get the vomiting started to see what her troponins do.  Get her blood pressure under better control.    Electronically signed by Byron Sam MD, 06/24/25, 10:56 PM EDT.  Patient's blood pressure is down now she is talking she is no longer vomiting.  Waiting for the second lactic to come back and then we will get her home.  She is feeling much better she is not crying anymore.    Lactic acid is still up but it is coming down.  Figure she can drink fluids tomorrow and we will recheck her lab work on Thursday make sure she is getting better.    Her troponin is normal she has not had a heart attack.  She is feeling much better she is happy and calm collected back there.    Amount and/or Complexity of Data Reviewed  Labs: ordered.  Radiology: ordered.  ECG/medicine tests: ordered.    Risk  Prescription drug management.        Final diagnoses:   Hypertensive crisis       ED Disposition  ED Disposition       ED Disposition   Discharge    Condition   Stable    Comment   --               Eldon Christian, PA  [address] [place], [place] [id number]  [phone]      See on Thursday for a recheck.  If you blood pressure goes out of control, again return.  Pt getting a repeat bmp and lactic acid on Thursday         Medication List        New Prescriptions      TiZANidine 4 MG capsule  Commonly known as: ZANAFLEX  Take 1 capsule by mouth 3 (Three) Times a Day As Needed for Muscle Spasms.  Replaces: tiZANidine 2 MG tablet            Stop      tiZANidine 2 MG tablet  Commonly known as:  ZANAFLEX  Replaced by: TiZANidine 4 MG capsule               Where to Get Your Medications        These medications were sent to [facility] [place], [place] - [address] [place] [address] [phone] [place] [id number] [address] [address]      Phone: [phone]   TiZANidine 4 MG capsule            Byron Sam MD  06/25/25 0153

## 2025-06-24 NOTE — ED NOTES
Pt continues to actively vomit, provider made aware, no new orders noted. Reassurance given to pt and family, discussed POC and wait time, verb understanding, denies needs or concerns at this time.

## 2025-06-25 LAB
QT INTERVAL: 342 MS
QTC INTERVAL: 483 MS

## 2025-06-25 RX ADMIN — POTASSIUM CHLORIDE 20 MEQ: 1500 TABLET, EXTENDED RELEASE ORAL at 00:02

## 2025-07-01 ENCOUNTER — HOSPITAL ENCOUNTER (OUTPATIENT)
Dept: MAMMOGRAPHY | Facility: HOSPITAL | Age: 55
Discharge: HOME | End: 2025-07-01
Admitting: PHYSICIAN ASSISTANT
Payer: COMMERCIAL

## 2025-07-01 DIAGNOSIS — Z12.31 VISIT FOR SCREENING MAMMOGRAM: ICD-10-CM

## 2025-07-01 PROCEDURE — 77063 BREAST TOMOSYNTHESIS BI: CPT

## 2025-07-01 PROCEDURE — 77067 SCR MAMMO BI INCL CAD: CPT

## 2025-07-01 PROCEDURE — 77067 SCR MAMMO BI INCL CAD: CPT | Performed by: RADIOLOGY

## 2025-07-01 PROCEDURE — 77063 BREAST TOMOSYNTHESIS BI: CPT | Performed by: RADIOLOGY

## (undated) DEVICE — TUBING, SUCTION, 1/4" X 20', STRAIGHT: Brand: MEDLINE INDUSTRIES, INC.

## (undated) DEVICE — MODEL AT P65, P/N 701554-001KIT CONTENTS: HAND CONTROLLER, 3-WAY HIGH-PRESSURE STOPCOCK WITH ROTATING END AND PREMIUM HIGH-PRESSURE TUBING: Brand: ANGIOTOUCH® KIT

## (undated) DEVICE — ARM DRAPE

## (undated) DEVICE — GW INQW FIX/CORE PTFE J/3MM .035 260CM

## (undated) DEVICE — FRCP BX RADJAW4 NDL 2.8 240CM LG OG BX40

## (undated) DEVICE — CADIERE FORCEPS: Brand: ENDOWRIST

## (undated) DEVICE — TR BAND RADIAL ARTERY COMPRESSION DEVICE: Brand: TR BAND

## (undated) DEVICE — 2, DISPOSABLE SUCTION/IRRIGATOR WITH DISPOSABLE TIP: Brand: STRYKEFLOW

## (undated) DEVICE — INSUFFLATION NEEDLE TO ESTABLISH PNEUMOPERITONEUM.: Brand: INSUFFLATION NEEDLE

## (undated) DEVICE — GLIDESHEATH SLENDER STAINLESS STEEL KIT: Brand: GLIDESHEATH SLENDER

## (undated) DEVICE — CONN Y IRR DISP 1P/U

## (undated) DEVICE — ST INF PRI SMRTSTE 20DRP 2VLV 24ML 117

## (undated) DEVICE — SINGLE PORT MANIFOLD: Brand: NEPTUNE 2

## (undated) DEVICE — Device

## (undated) DEVICE — PK CATH CARD 70

## (undated) DEVICE — Device: Brand: DEFENDO AIR/WATER/SUCTION AND BIOPSY VALVE

## (undated) DEVICE — COVER,MAYO STAND,STERILE: Brand: MEDLINE

## (undated) DEVICE — 40595 XL TRENDELENBURG POSITIONING KIT: Brand: 40595 XL TRENDELENBURG POSITIONING KIT

## (undated) DEVICE — PAD POSTN ARMBND 1P/U

## (undated) DEVICE — PK LAP GEN 70

## (undated) DEVICE — SUT VIC 0/0 UR6 27IN DYED J603H

## (undated) DEVICE — ADULT DISPOSABLE SINGLE-PATIENT USE PULSE OXIMETER SENSOR: Brand: NONIN

## (undated) DEVICE — A2000 MULTI-USE SYRINGE KIT, P/N 701277-003KIT CONTENTS: 100ML CONTRAST RESERVOIR AND TUBING WITH CONTRAST SPIKE AND CLAMP: Brand: A2000 MULTI-USE SYRINGE KIT

## (undated) DEVICE — GOWN,REINF,POLY,ECL,PP SLV,XL: Brand: MEDLINE

## (undated) DEVICE — BLADELESS OBTURATOR: Brand: WECK VISTA

## (undated) DEVICE — ENDOGATOR AUXILIARY WATER JET CONNECTOR: Brand: ENDOGATOR

## (undated) DEVICE — DRSNG SURESITE WNDW 4X4.5

## (undated) DEVICE — LARGE HEM-O-LOK CLIP APPLIER: Brand: ENDOWRIST

## (undated) DEVICE — SKIN PREP TRAY W/CHG: Brand: MEDLINE INDUSTRIES, INC.

## (undated) DEVICE — SUT MNCRYL 4/0 PS2 18 IN

## (undated) DEVICE — GLV SURG PREMIERPRO MIC LTX PF SZ7.5 BRN

## (undated) DEVICE — PAD GRND REM POLYHESIVE A/ DISP

## (undated) DEVICE — PERMANENT CAUTERY HOOK: Brand: ENDOWRIST

## (undated) DEVICE — CANNULA SEAL

## (undated) DEVICE — CVR DISP HUG U VAC STEEP TREND

## (undated) DEVICE — SYR LUERLOK 30CC

## (undated) DEVICE — RADIFOCUS OPTITORQUE ANGIOGRAPHIC CATHETER: Brand: OPTITORQUE

## (undated) DEVICE — CANNULA,OXY,ADULT,SUPER SOFT,W/14'TUB,UC: Brand: MEDLINE INDUSTRIES, INC.

## (undated) DEVICE — EP LEFT SUBCLAVIAN SHIELD-YELLOW: Brand: RADPAD

## (undated) DEVICE — UNDERGLV SURG BIOGEL INDICAT PF 8 GRN

## (undated) DEVICE — ST EXT IV SMARTSITE 2VLV SP M LL 5ML IV1

## (undated) DEVICE — THE BITE BLOCK MAXI, LATEX FREE STRAP IS USED TO PROTECT THE ENDOSCOPE INSERTION TUBE FROM BEING BITTEN BY THE PATIENT.